# Patient Record
Sex: FEMALE | Race: ASIAN | NOT HISPANIC OR LATINO | Employment: UNEMPLOYED | URBAN - METROPOLITAN AREA
[De-identification: names, ages, dates, MRNs, and addresses within clinical notes are randomized per-mention and may not be internally consistent; named-entity substitution may affect disease eponyms.]

---

## 2017-07-06 ENCOUNTER — COMMUNICATION - HEALTHEAST (OUTPATIENT)
Dept: FAMILY MEDICINE | Facility: CLINIC | Age: 22
End: 2017-07-06

## 2017-07-06 DIAGNOSIS — Z11.1 SCREENING FOR TUBERCULOSIS: ICD-10-CM

## 2017-07-07 ENCOUNTER — AMBULATORY - HEALTHEAST (OUTPATIENT)
Dept: LAB | Facility: CLINIC | Age: 22
End: 2017-07-07

## 2017-07-07 DIAGNOSIS — Z11.1 SCREENING FOR TUBERCULOSIS: ICD-10-CM

## 2017-07-11 ENCOUNTER — COMMUNICATION - HEALTHEAST (OUTPATIENT)
Dept: FAMILY MEDICINE | Facility: CLINIC | Age: 22
End: 2017-07-11

## 2017-10-27 ENCOUNTER — COMMUNICATION - HEALTHEAST (OUTPATIENT)
Dept: FAMILY MEDICINE | Facility: CLINIC | Age: 22
End: 2017-10-27

## 2017-11-01 ENCOUNTER — OFFICE VISIT - HEALTHEAST (OUTPATIENT)
Dept: FAMILY MEDICINE | Facility: CLINIC | Age: 22
End: 2017-11-01

## 2017-11-01 DIAGNOSIS — Z00.00 ROUTINE GENERAL MEDICAL EXAMINATION AT A HEALTH CARE FACILITY: ICD-10-CM

## 2017-11-01 DIAGNOSIS — Z23 NEED FOR IMMUNIZATION AGAINST INFLUENZA: ICD-10-CM

## 2017-11-01 DIAGNOSIS — Z11.1 SCREENING-PULMONARY TB: ICD-10-CM

## 2017-11-01 ASSESSMENT — MIFFLIN-ST. JEOR: SCORE: 1116.03

## 2017-11-03 ENCOUNTER — COMMUNICATION - HEALTHEAST (OUTPATIENT)
Dept: FAMILY MEDICINE | Facility: CLINIC | Age: 22
End: 2017-11-03

## 2018-03-21 ENCOUNTER — COMMUNICATION - HEALTHEAST (OUTPATIENT)
Dept: FAMILY MEDICINE | Facility: CLINIC | Age: 23
End: 2018-03-21

## 2018-03-29 ENCOUNTER — OFFICE VISIT - HEALTHEAST (OUTPATIENT)
Dept: FAMILY MEDICINE | Facility: CLINIC | Age: 23
End: 2018-03-29

## 2018-03-29 DIAGNOSIS — Z00.00 ROUTINE GENERAL MEDICAL EXAMINATION AT A HEALTH CARE FACILITY: ICD-10-CM

## 2018-03-29 DIAGNOSIS — Z32.01 POSITIVE PREGNANCY TEST: ICD-10-CM

## 2018-03-29 LAB
CLUE CELLS: NORMAL
HCG UR QL: POSITIVE
TRICHOMONAS, WET PREP: NORMAL
YEAST, WET PREP: NORMAL

## 2018-03-29 ASSESSMENT — MIFFLIN-ST. JEOR: SCORE: 1114.33

## 2018-03-30 LAB
C TRACH DNA SPEC QL PROBE+SIG AMP: NEGATIVE
N GONORRHOEA DNA SPEC QL NAA+PROBE: NEGATIVE

## 2018-04-11 ENCOUNTER — COMMUNICATION - HEALTHEAST (OUTPATIENT)
Dept: FAMILY MEDICINE | Facility: CLINIC | Age: 23
End: 2018-04-11

## 2018-04-13 ENCOUNTER — PRENATAL OFFICE VISIT - HEALTHEAST (OUTPATIENT)
Dept: FAMILY MEDICINE | Facility: CLINIC | Age: 23
End: 2018-04-13

## 2018-04-13 DIAGNOSIS — Z32.01 POSITIVE PREGNANCY TEST: ICD-10-CM

## 2018-04-13 DIAGNOSIS — Z34.90 SUPERVISION OF NORMAL PREGNANCY: ICD-10-CM

## 2018-04-13 LAB
ALBUMIN UR-MCNC: NEGATIVE MG/DL
AMPHETAMINES UR QL SCN: NORMAL
BARBITURATES UR QL: NORMAL
BASOPHILS # BLD AUTO: 0 THOU/UL (ref 0–0.2)
BASOPHILS NFR BLD AUTO: 0 % (ref 0–2)
BENZODIAZ UR QL: NORMAL
CANNABINOIDS UR QL SCN: NORMAL
COCAINE UR QL: NORMAL
COLLECTION METHOD: NORMAL
CREAT UR-MCNC: 223.5 MG/DL
EOSINOPHIL # BLD AUTO: 0 THOU/UL (ref 0–0.4)
EOSINOPHIL NFR BLD AUTO: 0 % (ref 0–6)
ERYTHROCYTE [DISTWIDTH] IN BLOOD BY AUTOMATED COUNT: 12.7 % (ref 11–14.5)
GLUCOSE UR STRIP-MCNC: NEGATIVE MG/DL
HCT VFR BLD AUTO: 37.5 % (ref 35–47)
HGB BLD-MCNC: 12.2 G/DL (ref 12–16)
HIV 1+2 AB+HIV1 P24 AG SERPL QL IA: NEGATIVE
KETONES UR STRIP-MCNC: NEGATIVE MG/DL
LEAD BLD-MCNC: NORMAL UG/DL
LEAD RETEST: NO
LYMPHOCYTES # BLD AUTO: 1.2 THOU/UL (ref 0.8–4.4)
LYMPHOCYTES NFR BLD AUTO: 22 % (ref 20–40)
MCH RBC QN AUTO: 30 PG (ref 27–34)
MCHC RBC AUTO-ENTMCNC: 32.5 G/DL (ref 32–36)
MCV RBC AUTO: 92 FL (ref 80–100)
MONOCYTES # BLD AUTO: 0.3 THOU/UL (ref 0–0.9)
MONOCYTES NFR BLD AUTO: 6 % (ref 2–10)
NEUTROPHILS # BLD AUTO: 4 THOU/UL (ref 2–7.7)
NEUTROPHILS NFR BLD AUTO: 72 % (ref 50–70)
OPIATES UR QL SCN: NORMAL
OXYCODONE UR QL: NORMAL
PCP UR QL SCN: NORMAL
PLATELET # BLD AUTO: 252 THOU/UL (ref 140–440)
PMV BLD AUTO: 10.7 FL (ref 8.5–12.5)
RBC # BLD AUTO: 4.07 MILL/UL (ref 3.8–5.4)
WBC: 5.6 THOU/UL (ref 4–11)

## 2018-04-14 LAB
ANTIBODY SCREEN: NEGATIVE
GUARDIAN FIRST NAME: NORMAL
GUARDIAN LAST NAME: NORMAL
HBV SURFACE AG SERPL QL IA: NEGATIVE
HEALTH CARE PROVIDER CITY: NORMAL
HEALTH CARE PROVIDER NAME: NORMAL
HEALTH CARE PROVIDER PHONE: NORMAL
HEALTH CARE PROVIDER STATE: NORMAL
HEALTH CARE PROVIDER STREET ADDRESS: NORMAL
HEALTH CARE PROVIDER ZIP CODE: NORMAL
LEAD, B: <1 MCG/DL (ref 0–4.9)
PATIENT CITY: NORMAL
PATIENT COUNTY: NORMAL
PATIENT EMPLOYER: NORMAL
PATIENT ETHNICITY: NORMAL
PATIENT HOME PHONE: NORMAL
PATIENT OCCUPATION: NORMAL
PATIENT RACE: NORMAL
PATIENT STATE: NORMAL
PATIENT STREET ADDRESS: NORMAL
PATIENT ZIP CODE: NORMAL
SUBMITTING LABORATORY PHONE: NORMAL
T PALLIDUM AB SER QL: NEGATIVE
VENOUS/CAPILLARY: NORMAL

## 2018-04-15 ENCOUNTER — AMBULATORY - HEALTHEAST (OUTPATIENT)
Dept: FAMILY MEDICINE | Facility: CLINIC | Age: 23
End: 2018-04-15

## 2018-04-15 LAB — BACTERIA SPEC CULT: ABNORMAL

## 2018-04-16 LAB
ABO/RH(D): NORMAL
ABORH REPEAT: NORMAL
RUBV IGG SERPL QL IA: POSITIVE

## 2018-04-20 ENCOUNTER — HOSPITAL ENCOUNTER (OUTPATIENT)
Dept: ULTRASOUND IMAGING | Facility: HOSPITAL | Age: 23
Discharge: HOME | End: 2018-04-20
Attending: PHYSICIAN ASSISTANT

## 2018-04-20 DIAGNOSIS — Z34.90 SUPERVISION OF NORMAL PREGNANCY: ICD-10-CM

## 2018-04-20 DIAGNOSIS — Z32.01 POSITIVE PREGNANCY TEST: ICD-10-CM

## 2018-05-08 ENCOUNTER — COMMUNICATION - HEALTHEAST (OUTPATIENT)
Dept: FAMILY MEDICINE | Facility: CLINIC | Age: 23
End: 2018-05-08

## 2018-05-11 ENCOUNTER — PRENATAL OFFICE VISIT - HEALTHEAST (OUTPATIENT)
Dept: FAMILY MEDICINE | Facility: CLINIC | Age: 23
End: 2018-05-11

## 2018-05-11 DIAGNOSIS — Z34.90 SUPERVISION OF NORMAL PREGNANCY: ICD-10-CM

## 2018-05-11 LAB
ALBUMIN UR-MCNC: ABNORMAL MG/DL
GLUCOSE UR STRIP-MCNC: NEGATIVE MG/DL
KETONES UR STRIP-MCNC: ABNORMAL MG/DL

## 2018-05-11 ASSESSMENT — MIFFLIN-ST. JEOR: SCORE: 1127.94

## 2018-05-12 LAB — BACTERIA SPEC CULT: NO GROWTH

## 2018-06-08 ENCOUNTER — PRENATAL OFFICE VISIT - HEALTHEAST (OUTPATIENT)
Dept: FAMILY MEDICINE | Facility: CLINIC | Age: 23
End: 2018-06-08

## 2018-06-08 DIAGNOSIS — Z34.90 SUPERVISION OF NORMAL PREGNANCY: ICD-10-CM

## 2018-06-08 LAB
ALBUMIN UR-MCNC: ABNORMAL MG/DL
GLUCOSE UR STRIP-MCNC: NEGATIVE MG/DL
KETONES UR STRIP-MCNC: NEGATIVE MG/DL

## 2018-06-08 ASSESSMENT — MIFFLIN-ST. JEOR: SCORE: 1115.46

## 2018-07-06 ENCOUNTER — PRENATAL OFFICE VISIT - HEALTHEAST (OUTPATIENT)
Dept: FAMILY MEDICINE | Facility: CLINIC | Age: 23
End: 2018-07-06

## 2018-07-06 DIAGNOSIS — Z34.92 NORMAL PREGNANCY, SECOND TRIMESTER: ICD-10-CM

## 2018-07-06 LAB
ALBUMIN UR-MCNC: NEGATIVE MG/DL
GLUCOSE UR STRIP-MCNC: NEGATIVE MG/DL
KETONES UR STRIP-MCNC: ABNORMAL MG/DL

## 2018-07-13 ENCOUNTER — HOSPITAL ENCOUNTER (OUTPATIENT)
Dept: ULTRASOUND IMAGING | Facility: HOSPITAL | Age: 23
Discharge: HOME | End: 2018-07-13
Attending: FAMILY MEDICINE

## 2018-08-01 ENCOUNTER — PRENATAL OFFICE VISIT - HEALTHEAST (OUTPATIENT)
Dept: FAMILY MEDICINE | Facility: CLINIC | Age: 23
End: 2018-08-01

## 2018-08-01 DIAGNOSIS — Z34.92 ENCOUNTER FOR SUPERVISION OF NORMAL PREGNANCY IN SECOND TRIMESTER: ICD-10-CM

## 2018-08-01 LAB
ALBUMIN UR-MCNC: NEGATIVE MG/DL
GLUCOSE UR STRIP-MCNC: NEGATIVE MG/DL
KETONES UR STRIP-MCNC: NEGATIVE MG/DL

## 2018-08-20 ENCOUNTER — COMMUNICATION - HEALTHEAST (OUTPATIENT)
Dept: FAMILY MEDICINE | Facility: CLINIC | Age: 23
End: 2018-08-20

## 2018-08-20 DIAGNOSIS — Z11.1 SCREENING-PULMONARY TB: ICD-10-CM

## 2018-08-27 ENCOUNTER — AMBULATORY - HEALTHEAST (OUTPATIENT)
Dept: LAB | Facility: CLINIC | Age: 23
End: 2018-08-27

## 2018-08-27 DIAGNOSIS — Z11.1 SCREENING-PULMONARY TB: ICD-10-CM

## 2018-08-29 ENCOUNTER — PRENATAL OFFICE VISIT - HEALTHEAST (OUTPATIENT)
Dept: FAMILY MEDICINE | Facility: CLINIC | Age: 23
End: 2018-08-29

## 2018-08-29 DIAGNOSIS — Z32.01 POSITIVE PREGNANCY TEST: ICD-10-CM

## 2018-08-29 DIAGNOSIS — Z34.92 ENCOUNTER FOR SUPERVISION OF NORMAL PREGNANCY IN SECOND TRIMESTER: ICD-10-CM

## 2018-08-29 LAB
ALBUMIN UR-MCNC: NEGATIVE MG/DL
FASTING STATUS PATIENT QL REPORTED: NO
GLUCOSE 1H P 50 G GLC PO SERPL-MCNC: 102 MG/DL (ref 70–139)
GLUCOSE UR STRIP-MCNC: NEGATIVE MG/DL
HGB BLD-MCNC: 11.2 G/DL (ref 12–16)
KETONES UR STRIP-MCNC: NEGATIVE MG/DL

## 2018-08-30 ENCOUNTER — COMMUNICATION - HEALTHEAST (OUTPATIENT)
Dept: FAMILY MEDICINE | Facility: CLINIC | Age: 23
End: 2018-08-30

## 2018-08-30 LAB
BACTERIA SPEC CULT: NO GROWTH
T PALLIDUM AB SER QL: NEGATIVE

## 2018-09-07 ENCOUNTER — COMMUNICATION - HEALTHEAST (OUTPATIENT)
Dept: FAMILY MEDICINE | Facility: CLINIC | Age: 23
End: 2018-09-07

## 2018-09-10 ENCOUNTER — AMBULATORY - HEALTHEAST (OUTPATIENT)
Dept: LAB | Facility: CLINIC | Age: 23
End: 2018-09-10

## 2018-09-10 DIAGNOSIS — Z11.1 SCREENING-PULMONARY TB: ICD-10-CM

## 2018-09-12 ENCOUNTER — COMMUNICATION - HEALTHEAST (OUTPATIENT)
Dept: FAMILY MEDICINE | Facility: CLINIC | Age: 23
End: 2018-09-12

## 2018-09-13 LAB
GAMMA INTERFERON BACKGROUND BLD IA-ACNC: 0.24 IU/ML
M TB IFN-G BLD-IMP: NEGATIVE
MITOGEN IGNF BCKGRD COR BLD-ACNC: 0.05 IU/ML
MITOGEN IGNF BCKGRD COR BLD-ACNC: 0.06 IU/ML
QTF INTERPRETATION: NORMAL
QTF MITOGEN - NIL: 6.89 IU/ML

## 2018-09-21 ENCOUNTER — AMBULATORY - HEALTHEAST (OUTPATIENT)
Dept: NURSING | Facility: CLINIC | Age: 23
End: 2018-09-21

## 2018-09-21 DIAGNOSIS — Z23 NEED FOR IMMUNIZATION AGAINST INFLUENZA: ICD-10-CM

## 2018-09-28 ENCOUNTER — PRENATAL OFFICE VISIT - HEALTHEAST (OUTPATIENT)
Dept: FAMILY MEDICINE | Facility: CLINIC | Age: 23
End: 2018-09-28

## 2018-09-28 DIAGNOSIS — Z34.90 SUPERVISION OF NORMAL PREGNANCY: ICD-10-CM

## 2018-09-28 DIAGNOSIS — Z34.92 ENCOUNTER FOR SUPERVISION OF NORMAL PREGNANCY IN SECOND TRIMESTER: ICD-10-CM

## 2018-10-26 ENCOUNTER — PRENATAL OFFICE VISIT - HEALTHEAST (OUTPATIENT)
Dept: FAMILY MEDICINE | Facility: CLINIC | Age: 23
End: 2018-10-26

## 2018-10-26 DIAGNOSIS — Z34.93 THIRD TRIMESTER PREGNANCY: ICD-10-CM

## 2018-10-26 DIAGNOSIS — Z34.92 ENCOUNTER FOR SUPERVISION OF NORMAL PREGNANCY IN SECOND TRIMESTER: ICD-10-CM

## 2018-10-27 LAB
ALLERGIC TO PENICILLIN: NO
GP B STREP DNA SPEC QL NAA+PROBE: NEGATIVE

## 2018-11-02 ENCOUNTER — PRENATAL OFFICE VISIT - HEALTHEAST (OUTPATIENT)
Dept: FAMILY MEDICINE | Facility: CLINIC | Age: 23
End: 2018-11-02

## 2018-11-02 DIAGNOSIS — Z34.92 ENCOUNTER FOR SUPERVISION OF NORMAL PREGNANCY IN SECOND TRIMESTER: ICD-10-CM

## 2018-11-09 ENCOUNTER — PRENATAL OFFICE VISIT - HEALTHEAST (OUTPATIENT)
Dept: FAMILY MEDICINE | Facility: CLINIC | Age: 23
End: 2018-11-09

## 2018-11-09 DIAGNOSIS — Z34.93 THIRD TRIMESTER PREGNANCY: ICD-10-CM

## 2018-11-09 ASSESSMENT — MIFFLIN-ST. JEOR: SCORE: 1212.98

## 2018-11-12 ENCOUNTER — HOSPITAL ENCOUNTER (OUTPATIENT)
Dept: OBGYN | Facility: HOSPITAL | Age: 23
Discharge: HOME | End: 2018-11-12
Attending: FAMILY MEDICINE | Admitting: FAMILY MEDICINE

## 2018-11-12 ASSESSMENT — MIFFLIN-ST. JEOR: SCORE: 1210.72

## 2018-11-14 ENCOUNTER — HOME CARE/HOSPICE - HEALTHEAST (OUTPATIENT)
Dept: HOME HEALTH SERVICES | Facility: HOME HEALTH | Age: 23
End: 2018-11-14

## 2018-11-15 ENCOUNTER — HOME CARE/HOSPICE - HEALTHEAST (OUTPATIENT)
Dept: HOME HEALTH SERVICES | Facility: HOME HEALTH | Age: 23
End: 2018-11-15

## 2019-02-06 ENCOUNTER — RECORDS - HEALTHEAST (OUTPATIENT)
Dept: LAB | Facility: HOSPITAL | Age: 24
End: 2019-02-06

## 2019-02-06 LAB — HBV SURFACE AB SERPL IA-ACNC: POSITIVE M[IU]/ML

## 2019-02-08 LAB
GAMMA INTERFERON BACKGROUND BLD IA-ACNC: 0.09 IU/ML
M TB IFN-G BLD-IMP: NEGATIVE
MITOGEN IGNF BCKGRD COR BLD-ACNC: 0.03 IU/ML
MITOGEN IGNF BCKGRD COR BLD-ACNC: 0.05 IU/ML
QTF INTERPRETATION: NORMAL
QTF MITOGEN - NIL: >10 IU/ML

## 2019-06-25 ENCOUNTER — COMMUNICATION - HEALTHEAST (OUTPATIENT)
Dept: FAMILY MEDICINE | Facility: CLINIC | Age: 24
End: 2019-06-25

## 2019-07-02 ENCOUNTER — PRENATAL OFFICE VISIT - HEALTHEAST (OUTPATIENT)
Dept: FAMILY MEDICINE | Facility: CLINIC | Age: 24
End: 2019-07-02

## 2019-07-02 ENCOUNTER — HOSPITAL ENCOUNTER (OUTPATIENT)
Dept: ULTRASOUND IMAGING | Facility: HOSPITAL | Age: 24
Discharge: HOME | End: 2019-07-02
Attending: PHYSICIAN ASSISTANT

## 2019-07-02 DIAGNOSIS — Z32.01 PREGNANCY TEST POSITIVE: ICD-10-CM

## 2019-07-02 DIAGNOSIS — Z34.90 PREGNANCY, UNSPECIFIED GESTATIONAL AGE: ICD-10-CM

## 2019-07-02 DIAGNOSIS — N92.6 ABNORMAL MENSES: ICD-10-CM

## 2019-07-02 LAB
ALBUMIN UR-MCNC: ABNORMAL MG/DL
AMPHETAMINES UR QL SCN: NORMAL
BARBITURATES UR QL: NORMAL
BASOPHILS # BLD AUTO: 0 THOU/UL (ref 0–0.2)
BASOPHILS NFR BLD AUTO: 0 % (ref 0–2)
BENZODIAZ UR QL: NORMAL
CANNABINOIDS UR QL SCN: NORMAL
COCAINE UR QL: NORMAL
CREAT UR-MCNC: 264.9 MG/DL
EOSINOPHIL # BLD AUTO: 0 THOU/UL (ref 0–0.4)
EOSINOPHIL NFR BLD AUTO: 0 % (ref 0–6)
ERYTHROCYTE [DISTWIDTH] IN BLOOD BY AUTOMATED COUNT: 13.7 % (ref 11–14.5)
GLUCOSE UR STRIP-MCNC: NEGATIVE MG/DL
HCG UR QL: POSITIVE
HCT VFR BLD AUTO: 33.3 % (ref 35–47)
HGB BLD-MCNC: 10.8 G/DL (ref 12–16)
HIV 1+2 AB+HIV1 P24 AG SERPL QL IA: NEGATIVE
KETONES UR STRIP-MCNC: NEGATIVE MG/DL
LYMPHOCYTES # BLD AUTO: 1.3 THOU/UL (ref 0.8–4.4)
LYMPHOCYTES NFR BLD AUTO: 24 % (ref 20–40)
MCH RBC QN AUTO: 29.7 PG (ref 27–34)
MCHC RBC AUTO-ENTMCNC: 32.4 G/DL (ref 32–36)
MCV RBC AUTO: 92 FL (ref 80–100)
MONOCYTES # BLD AUTO: 0.3 THOU/UL (ref 0–0.9)
MONOCYTES NFR BLD AUTO: 5 % (ref 2–10)
NEUTROPHILS # BLD AUTO: 4 THOU/UL (ref 2–7.7)
NEUTROPHILS NFR BLD AUTO: 71 % (ref 50–70)
OPIATES UR QL SCN: NORMAL
OXYCODONE UR QL: NORMAL
PCP UR QL SCN: NORMAL
PLATELET # BLD AUTO: 261 THOU/UL (ref 140–440)
PMV BLD AUTO: 10.5 FL (ref 8.5–12.5)
RBC # BLD AUTO: 3.64 MILL/UL (ref 3.8–5.4)
WBC: 5.6 THOU/UL (ref 4–11)

## 2019-07-03 ENCOUNTER — COMMUNICATION - HEALTHEAST (OUTPATIENT)
Dept: FAMILY MEDICINE | Facility: CLINIC | Age: 24
End: 2019-07-03

## 2019-07-03 LAB
ABO/RH(D): NORMAL
ABORH REPEAT: NORMAL
ANTIBODY SCREEN: NEGATIVE
BACTERIA SPEC CULT: NO GROWTH
COLLECTION METHOD: NORMAL
HBV SURFACE AG SERPL QL IA: NEGATIVE
LEAD BLD-MCNC: NORMAL UG/DL
LEAD RETEST: NO
RUBV IGG SERPL QL IA: POSITIVE
T PALLIDUM AB SER QL: NEGATIVE

## 2019-07-04 LAB — LEAD BLDV-MCNC: <2 UG/DL (ref 0–4.9)

## 2019-07-05 ENCOUNTER — AMBULATORY - HEALTHEAST (OUTPATIENT)
Dept: NURSING | Facility: CLINIC | Age: 24
End: 2019-07-05

## 2019-07-05 DIAGNOSIS — Z11.1 SCREENING EXAMINATION FOR PULMONARY TUBERCULOSIS: ICD-10-CM

## 2019-07-08 ENCOUNTER — COMMUNICATION - HEALTHEAST (OUTPATIENT)
Dept: FAMILY MEDICINE | Facility: CLINIC | Age: 24
End: 2019-07-08

## 2019-07-08 LAB
GAMMA INTERFERON BACKGROUND BLD IA-ACNC: 0.05 IU/ML
M TB IFN-G BLD-IMP: NEGATIVE
MITOGEN IGNF BCKGRD COR BLD-ACNC: -0.01 IU/ML
MITOGEN IGNF BCKGRD COR BLD-ACNC: 0 IU/ML
QTF INTERPRETATION: NORMAL
QTF MITOGEN - NIL: 5.61 IU/ML

## 2019-07-15 ENCOUNTER — COMMUNICATION - HEALTHEAST (OUTPATIENT)
Dept: FAMILY MEDICINE | Facility: CLINIC | Age: 24
End: 2019-07-15

## 2019-07-18 ENCOUNTER — COMMUNICATION - HEALTHEAST (OUTPATIENT)
Dept: FAMILY MEDICINE | Facility: CLINIC | Age: 24
End: 2019-07-18

## 2019-07-22 ENCOUNTER — COMMUNICATION - HEALTHEAST (OUTPATIENT)
Dept: FAMILY MEDICINE | Facility: CLINIC | Age: 24
End: 2019-07-22

## 2019-07-23 ENCOUNTER — AMBULATORY - HEALTHEAST (OUTPATIENT)
Dept: FAMILY MEDICINE | Facility: CLINIC | Age: 24
End: 2019-07-23

## 2019-07-23 ENCOUNTER — AMBULATORY - HEALTHEAST (OUTPATIENT)
Dept: LAB | Facility: CLINIC | Age: 24
End: 2019-07-23

## 2019-07-23 DIAGNOSIS — Z11.1 SCREENING-PULMONARY TB: ICD-10-CM

## 2019-07-26 LAB
GAMMA INTERFERON BACKGROUND BLD IA-ACNC: 0.15 IU/ML
M TB IFN-G BLD-IMP: NEGATIVE
MITOGEN IGNF BCKGRD COR BLD-ACNC: -0.02 IU/ML
MITOGEN IGNF BCKGRD COR BLD-ACNC: -0.04 IU/ML
QTF INTERPRETATION: NORMAL
QTF MITOGEN - NIL: >10 IU/ML

## 2019-07-29 ENCOUNTER — OFFICE VISIT - HEALTHEAST (OUTPATIENT)
Dept: FAMILY MEDICINE | Facility: CLINIC | Age: 24
End: 2019-07-29

## 2019-07-29 DIAGNOSIS — Z00.00 ROUTINE GENERAL MEDICAL EXAMINATION AT A HEALTH CARE FACILITY: ICD-10-CM

## 2019-07-29 DIAGNOSIS — Z23 NEED FOR TETANUS BOOSTER: ICD-10-CM

## 2019-07-29 DIAGNOSIS — L70.8 OTHER ACNE: ICD-10-CM

## 2019-07-29 DIAGNOSIS — Q67.5 CONGENITAL MUSCULOSKELETAL DEFORMITY OF SPINE: ICD-10-CM

## 2019-07-29 DIAGNOSIS — R10.13 ABDOMINAL PAIN, EPIGASTRIC: ICD-10-CM

## 2019-07-29 LAB
ALBUMIN SERPL-MCNC: 4.2 G/DL (ref 3.5–5)
ALBUMIN UR-MCNC: NEGATIVE MG/DL
ALP SERPL-CCNC: 68 U/L (ref 45–120)
ALT SERPL W P-5'-P-CCNC: <9 U/L (ref 0–45)
AMYLASE SERPL-CCNC: 80 U/L (ref 5–120)
ANION GAP SERPL CALCULATED.3IONS-SCNC: 11 MMOL/L (ref 5–18)
APPEARANCE UR: CLEAR
AST SERPL W P-5'-P-CCNC: 15 U/L (ref 0–40)
BACTERIA #/AREA URNS HPF: ABNORMAL HPF
BASOPHILS # BLD AUTO: 0 THOU/UL (ref 0–0.2)
BASOPHILS NFR BLD AUTO: 0 % (ref 0–2)
BILIRUB SERPL-MCNC: 0.5 MG/DL (ref 0–1)
BILIRUB UR QL STRIP: NEGATIVE
BUN SERPL-MCNC: 12 MG/DL (ref 8–22)
CALCIUM SERPL-MCNC: 9.8 MG/DL (ref 8.5–10.5)
CHLORIDE BLD-SCNC: 107 MMOL/L (ref 98–107)
CO2 SERPL-SCNC: 20 MMOL/L (ref 22–31)
COLOR UR AUTO: YELLOW
CREAT SERPL-MCNC: 0.66 MG/DL (ref 0.6–1.1)
EOSINOPHIL # BLD AUTO: 0 THOU/UL (ref 0–0.4)
EOSINOPHIL NFR BLD AUTO: 0 % (ref 0–6)
ERYTHROCYTE [DISTWIDTH] IN BLOOD BY AUTOMATED COUNT: 11.3 % (ref 11–14.5)
GFR SERPL CREATININE-BSD FRML MDRD: >60 ML/MIN/1.73M2
GLUCOSE BLD-MCNC: 80 MG/DL (ref 70–125)
GLUCOSE UR STRIP-MCNC: NEGATIVE MG/DL
HCT VFR BLD AUTO: 41.5 % (ref 35–47)
HGB BLD-MCNC: 13.6 G/DL (ref 12–16)
HGB UR QL STRIP: ABNORMAL
HIV 1+2 AB+HIV1 P24 AG SERPL QL IA: NEGATIVE
KETONES UR STRIP-MCNC: NEGATIVE MG/DL
LEUKOCYTE ESTERASE UR QL STRIP: NEGATIVE
LIPASE SERPL-CCNC: 20 U/L (ref 0–52)
LYMPHOCYTES # BLD AUTO: 1.7 THOU/UL (ref 0.8–4.4)
LYMPHOCYTES NFR BLD AUTO: 27 % (ref 20–40)
MCH RBC QN AUTO: 31.3 PG (ref 27–34)
MCHC RBC AUTO-ENTMCNC: 32.8 G/DL (ref 32–36)
MCV RBC AUTO: 95 FL (ref 80–100)
MONOCYTES # BLD AUTO: 0.3 THOU/UL (ref 0–0.9)
MONOCYTES NFR BLD AUTO: 4 % (ref 2–10)
NEUTROPHILS # BLD AUTO: 4.3 THOU/UL (ref 2–7.7)
NEUTROPHILS NFR BLD AUTO: 69 % (ref 50–70)
NITRATE UR QL: NEGATIVE
PH UR STRIP: 6 [PH] (ref 5–8)
PLATELET # BLD AUTO: 236 THOU/UL (ref 140–440)
PMV BLD AUTO: 10.5 FL (ref 8.5–12.5)
POTASSIUM BLD-SCNC: 4.2 MMOL/L (ref 3.5–5)
PROT SERPL-MCNC: 7.4 G/DL (ref 6–8)
RBC # BLD AUTO: 4.35 MILL/UL (ref 3.8–5.4)
RBC #/AREA URNS AUTO: ABNORMAL HPF
SODIUM SERPL-SCNC: 138 MMOL/L (ref 136–145)
SP GR UR STRIP: 1.01 (ref 1–1.03)
SQUAMOUS #/AREA URNS AUTO: ABNORMAL LPF
UROBILINOGEN UR STRIP-ACNC: ABNORMAL
WBC #/AREA URNS AUTO: ABNORMAL HPF
WBC: 6.3 THOU/UL (ref 4–11)

## 2019-07-29 ASSESSMENT — MIFFLIN-ST. JEOR: SCORE: 1115.12

## 2019-07-30 ENCOUNTER — HOSPITAL ENCOUNTER (OUTPATIENT)
Dept: ULTRASOUND IMAGING | Facility: HOSPITAL | Age: 24
Discharge: HOME | End: 2019-07-30
Attending: FAMILY MEDICINE

## 2019-07-30 DIAGNOSIS — R10.13 ABDOMINAL PAIN, EPIGASTRIC: ICD-10-CM

## 2019-07-30 LAB
BACTERIA SPEC CULT: NO GROWTH
C TRACH DNA SPEC QL PROBE+SIG AMP: NEGATIVE
N GONORRHOEA DNA SPEC QL NAA+PROBE: NEGATIVE

## 2019-07-31 ENCOUNTER — COMMUNICATION - HEALTHEAST (OUTPATIENT)
Dept: FAMILY MEDICINE | Facility: CLINIC | Age: 24
End: 2019-07-31

## 2019-08-01 ENCOUNTER — COMMUNICATION - HEALTHEAST (OUTPATIENT)
Dept: FAMILY MEDICINE | Facility: CLINIC | Age: 24
End: 2019-08-01

## 2019-08-05 ENCOUNTER — AMBULATORY - HEALTHEAST (OUTPATIENT)
Dept: FAMILY MEDICINE | Facility: CLINIC | Age: 24
End: 2019-08-05

## 2019-08-05 DIAGNOSIS — N20.0 NEPHROLITHIASIS: ICD-10-CM

## 2019-08-09 ENCOUNTER — OFFICE VISIT - HEALTHEAST (OUTPATIENT)
Dept: UROLOGY | Facility: CLINIC | Age: 24
End: 2019-08-09

## 2019-08-09 ENCOUNTER — HOSPITAL ENCOUNTER (OUTPATIENT)
Dept: CT IMAGING | Facility: CLINIC | Age: 24
Discharge: HOME | End: 2019-08-09
Attending: PHYSICIAN ASSISTANT

## 2019-08-09 DIAGNOSIS — R31.21 ASYMPTOMATIC MICROSCOPIC HEMATURIA: ICD-10-CM

## 2019-08-09 DIAGNOSIS — R10.9 ABDOMINAL PAIN: ICD-10-CM

## 2019-08-09 LAB
ALBUMIN UR-MCNC: NEGATIVE MG/DL
APPEARANCE UR: CLEAR
BILIRUB UR QL STRIP: NEGATIVE
COLOR UR AUTO: YELLOW
GLUCOSE UR STRIP-MCNC: NEGATIVE MG/DL
HGB UR QL STRIP: ABNORMAL
KETONES UR STRIP-MCNC: NEGATIVE MG/DL
LEUKOCYTE ESTERASE UR QL STRIP: NEGATIVE
NITRATE UR QL: NEGATIVE
PH UR STRIP: 6 [PH] (ref 5–8)
SP GR UR STRIP: 1.02 (ref 1–1.03)
UROBILINOGEN UR STRIP-ACNC: ABNORMAL

## 2019-09-27 ENCOUNTER — AMBULATORY - HEALTHEAST (OUTPATIENT)
Dept: NURSING | Facility: CLINIC | Age: 24
End: 2019-09-27

## 2019-09-27 ENCOUNTER — COMMUNICATION - HEALTHEAST (OUTPATIENT)
Dept: FAMILY MEDICINE | Facility: CLINIC | Age: 24
End: 2019-09-27

## 2019-10-11 ENCOUNTER — PRENATAL OFFICE VISIT - HEALTHEAST (OUTPATIENT)
Dept: FAMILY MEDICINE | Facility: CLINIC | Age: 24
End: 2019-10-11

## 2019-10-11 DIAGNOSIS — O09.899 SHORT INTERVAL BETWEEN PREGNANCIES AFFECTING PREGNANCY, ANTEPARTUM: ICD-10-CM

## 2019-10-11 DIAGNOSIS — O09.30 LATE PRENATAL CARE: ICD-10-CM

## 2019-10-11 DIAGNOSIS — Z3A.32 32 WEEKS GESTATION OF PREGNANCY: ICD-10-CM

## 2019-10-11 LAB — GLUCOSE 1H P 100 G GLC PO SERPL-MCNC: 112 MG/DL

## 2019-10-11 ASSESSMENT — MIFFLIN-ST. JEOR: SCORE: 1180.38

## 2019-10-14 LAB
C TRACH DNA SPEC QL PROBE+SIG AMP: NEGATIVE
N GONORRHOEA DNA SPEC QL NAA+PROBE: NEGATIVE

## 2019-10-28 ENCOUNTER — PRENATAL OFFICE VISIT - HEALTHEAST (OUTPATIENT)
Dept: FAMILY MEDICINE | Facility: CLINIC | Age: 24
End: 2019-10-28

## 2019-10-28 DIAGNOSIS — Z34.93 THIRD TRIMESTER PREGNANCY: ICD-10-CM

## 2019-10-28 DIAGNOSIS — Z34.90 PREGNANCY, UNSPECIFIED GESTATIONAL AGE: ICD-10-CM

## 2019-10-28 ASSESSMENT — MIFFLIN-ST. JEOR: SCORE: 1188.04

## 2019-11-12 ENCOUNTER — PRENATAL OFFICE VISIT - HEALTHEAST (OUTPATIENT)
Dept: FAMILY MEDICINE | Facility: CLINIC | Age: 24
End: 2019-11-12

## 2019-11-12 ENCOUNTER — AMBULATORY - HEALTHEAST (OUTPATIENT)
Dept: FAMILY MEDICINE | Facility: CLINIC | Age: 24
End: 2019-11-12

## 2019-11-12 DIAGNOSIS — O99.013 ANEMIA DURING PREGNANCY IN THIRD TRIMESTER: ICD-10-CM

## 2019-11-12 DIAGNOSIS — Z34.93 THIRD TRIMESTER PREGNANCY: ICD-10-CM

## 2019-11-12 LAB — HGB BLD-MCNC: 8.5 G/DL (ref 12–16)

## 2019-11-12 ASSESSMENT — MIFFLIN-ST. JEOR: SCORE: 1215.25

## 2019-11-13 ENCOUNTER — COMMUNICATION - HEALTHEAST (OUTPATIENT)
Dept: FAMILY MEDICINE | Facility: CLINIC | Age: 24
End: 2019-11-13

## 2019-11-14 LAB
ALLERGIC TO PENICILLIN: NO
GP B STREP DNA SPEC QL NAA+PROBE: NEGATIVE

## 2019-11-19 ENCOUNTER — PRENATAL OFFICE VISIT - HEALTHEAST (OUTPATIENT)
Dept: FAMILY MEDICINE | Facility: CLINIC | Age: 24
End: 2019-11-19

## 2019-11-19 DIAGNOSIS — Z34.93 THIRD TRIMESTER PREGNANCY: ICD-10-CM

## 2019-11-19 DIAGNOSIS — O99.013 ANEMIA DURING PREGNANCY IN THIRD TRIMESTER: ICD-10-CM

## 2019-11-19 ASSESSMENT — MIFFLIN-ST. JEOR: SCORE: 1201.64

## 2019-11-29 ENCOUNTER — PRENATAL OFFICE VISIT - HEALTHEAST (OUTPATIENT)
Dept: FAMILY MEDICINE | Facility: CLINIC | Age: 24
End: 2019-11-29

## 2019-11-29 DIAGNOSIS — Z34.93 THIRD TRIMESTER PREGNANCY: ICD-10-CM

## 2019-11-29 ASSESSMENT — MIFFLIN-ST. JEOR: SCORE: 1210.72

## 2019-12-01 ENCOUNTER — HOME CARE/HOSPICE - HEALTHEAST (OUTPATIENT)
Dept: HOME HEALTH SERVICES | Facility: HOME HEALTH | Age: 24
End: 2019-12-01

## 2019-12-02 ENCOUNTER — HOME CARE/HOSPICE - HEALTHEAST (OUTPATIENT)
Dept: HOME HEALTH SERVICES | Facility: HOME HEALTH | Age: 24
End: 2019-12-02

## 2019-12-08 ENCOUNTER — OFFICE VISIT - HEALTHEAST (OUTPATIENT)
Dept: FAMILY MEDICINE | Facility: CLINIC | Age: 24
End: 2019-12-08

## 2019-12-08 DIAGNOSIS — R30.0 DYSURIA: ICD-10-CM

## 2019-12-08 LAB
ALBUMIN UR-MCNC: NEGATIVE MG/DL
APPEARANCE UR: CLEAR
BACTERIA #/AREA URNS HPF: ABNORMAL HPF
BILIRUB UR QL STRIP: NEGATIVE
COLOR UR AUTO: YELLOW
GLUCOSE UR STRIP-MCNC: NEGATIVE MG/DL
HGB UR QL STRIP: ABNORMAL
KETONES UR STRIP-MCNC: NEGATIVE MG/DL
LEUKOCYTE ESTERASE UR QL STRIP: ABNORMAL
MUCOUS THREADS #/AREA URNS LPF: ABNORMAL LPF
NITRATE UR QL: NEGATIVE
PH UR STRIP: 6 [PH] (ref 5–8)
RBC #/AREA URNS AUTO: ABNORMAL HPF
SP GR UR STRIP: >=1.03 (ref 1–1.03)
SQUAMOUS #/AREA URNS AUTO: ABNORMAL LPF
UROBILINOGEN UR STRIP-ACNC: ABNORMAL
WBC #/AREA URNS AUTO: ABNORMAL HPF

## 2019-12-09 LAB — BACTERIA SPEC CULT: NORMAL

## 2019-12-13 ENCOUNTER — COMMUNICATION - HEALTHEAST (OUTPATIENT)
Dept: FAMILY MEDICINE | Facility: CLINIC | Age: 24
End: 2019-12-13

## 2020-07-15 ENCOUNTER — AMBULATORY - HEALTHEAST (OUTPATIENT)
Dept: LAB | Facility: CLINIC | Age: 25
End: 2020-07-15

## 2020-07-15 DIAGNOSIS — Z11.1 SCREENING EXAMINATION FOR PULMONARY TUBERCULOSIS: ICD-10-CM

## 2020-07-17 LAB
GAMMA INTERFERON BACKGROUND BLD IA-ACNC: 0.21 IU/ML
M TB IFN-G BLD-IMP: NEGATIVE
MITOGEN IGNF BCKGRD COR BLD-ACNC: -0.02 IU/ML
MITOGEN IGNF BCKGRD COR BLD-ACNC: -0.04 IU/ML
QTF INTERPRETATION: NORMAL
QTF MITOGEN - NIL: 6.78 IU/ML

## 2020-07-21 ENCOUNTER — COMMUNICATION - HEALTHEAST (OUTPATIENT)
Dept: FAMILY MEDICINE | Facility: CLINIC | Age: 25
End: 2020-07-21

## 2020-08-11 ENCOUNTER — AMBULATORY - HEALTHEAST (OUTPATIENT)
Dept: LAB | Facility: CLINIC | Age: 25
End: 2020-08-11

## 2020-08-11 DIAGNOSIS — Z11.1 TUBERCULOSIS SCREENING: ICD-10-CM

## 2020-08-12 ENCOUNTER — COMMUNICATION - HEALTHEAST (OUTPATIENT)
Dept: FAMILY MEDICINE | Facility: CLINIC | Age: 25
End: 2020-08-12

## 2020-08-14 LAB
GAMMA INTERFERON BACKGROUND BLD IA-ACNC: 0.1 IU/ML
M TB IFN-G BLD-IMP: NEGATIVE
MITOGEN IGNF BCKGRD COR BLD-ACNC: -0.01 IU/ML
MITOGEN IGNF BCKGRD COR BLD-ACNC: -0.01 IU/ML
QTF INTERPRETATION: NORMAL
QTF MITOGEN - NIL: 4.83 IU/ML

## 2020-08-25 ENCOUNTER — PRENATAL OFFICE VISIT - HEALTHEAST (OUTPATIENT)
Dept: FAMILY MEDICINE | Facility: CLINIC | Age: 25
End: 2020-08-25

## 2020-08-25 DIAGNOSIS — N92.6 ABNORMAL MENSES: ICD-10-CM

## 2020-08-25 DIAGNOSIS — Z32.01 PREGNANCY TEST POSITIVE: ICD-10-CM

## 2020-08-25 DIAGNOSIS — Z3A.08 8 WEEKS GESTATION OF PREGNANCY: ICD-10-CM

## 2020-08-25 LAB
ALBUMIN UR-MCNC: NEGATIVE MG/DL
AMPHETAMINES UR QL SCN: NORMAL
BARBITURATES UR QL: NORMAL
BASOPHILS # BLD AUTO: 0 THOU/UL (ref 0–0.2)
BASOPHILS NFR BLD AUTO: 0 % (ref 0–2)
BENZODIAZ UR QL: NORMAL
CANNABINOIDS UR QL SCN: NORMAL
COCAINE UR QL: NORMAL
CREAT UR-MCNC: 183.6 MG/DL
EOSINOPHIL # BLD AUTO: 0 THOU/UL (ref 0–0.4)
EOSINOPHIL NFR BLD AUTO: 0 % (ref 0–6)
ERYTHROCYTE [DISTWIDTH] IN BLOOD BY AUTOMATED COUNT: 16.1 % (ref 11–14.5)
GLUCOSE UR STRIP-MCNC: NEGATIVE MG/DL
HCG UR QL: POSITIVE
HCT VFR BLD AUTO: 34.2 % (ref 35–47)
HGB BLD-MCNC: 10.3 G/DL (ref 12–16)
HIV 1+2 AB+HIV1 P24 AG SERPL QL IA: NEGATIVE
IMM GRANULOCYTES # BLD: 0 THOU/UL
IMM GRANULOCYTES NFR BLD: 0 %
KETONES UR STRIP-MCNC: NEGATIVE MG/DL
LYMPHOCYTES # BLD AUTO: 1.4 THOU/UL (ref 0.8–4.4)
LYMPHOCYTES NFR BLD AUTO: 21 % (ref 20–40)
MCH RBC QN AUTO: 24.7 PG (ref 27–34)
MCHC RBC AUTO-ENTMCNC: 30.1 G/DL (ref 32–36)
MCV RBC AUTO: 82 FL (ref 80–100)
MONOCYTES # BLD AUTO: 0.3 THOU/UL (ref 0–0.9)
MONOCYTES NFR BLD AUTO: 4 % (ref 2–10)
NEUTROPHILS # BLD AUTO: 5 THOU/UL (ref 2–7.7)
NEUTROPHILS NFR BLD AUTO: 74 % (ref 50–70)
OPIATES UR QL SCN: NORMAL
OXYCODONE UR QL: NORMAL
PCP UR QL SCN: NORMAL
PLATELET # BLD AUTO: 342 THOU/UL (ref 140–440)
PMV BLD AUTO: 10.9 FL (ref 8.5–12.5)
RBC # BLD AUTO: 4.17 MILL/UL (ref 3.8–5.4)
WBC: 6.7 THOU/UL (ref 4–11)

## 2020-08-25 ASSESSMENT — MIFFLIN-ST. JEOR: SCORE: 1147.78

## 2020-08-26 ENCOUNTER — HOSPITAL ENCOUNTER (OUTPATIENT)
Dept: ULTRASOUND IMAGING | Facility: HOSPITAL | Age: 25
Discharge: HOME | End: 2020-08-26
Attending: PHYSICIAN ASSISTANT

## 2020-08-26 DIAGNOSIS — Z32.01 PREGNANCY TEST POSITIVE: ICD-10-CM

## 2020-08-26 DIAGNOSIS — Z3A.08 8 WEEKS GESTATION OF PREGNANCY: ICD-10-CM

## 2020-08-26 LAB
ABO/RH(D): NORMAL
ABORH REPEAT: NORMAL
ANTIBODY SCREEN: NEGATIVE
BACTERIA SPEC CULT: NO GROWTH
HBV SURFACE AG SERPL QL IA: NEGATIVE
RUBV IGG SERPL QL IA: POSITIVE
T PALLIDUM AB SER QL: NEGATIVE

## 2020-08-27 ENCOUNTER — COMMUNICATION - HEALTHEAST (OUTPATIENT)
Dept: FAMILY MEDICINE | Facility: CLINIC | Age: 25
End: 2020-08-27

## 2020-08-27 DIAGNOSIS — O20.0 THREATENED MISCARRIAGE: ICD-10-CM

## 2020-08-27 LAB
C TRACH DNA SPEC QL PROBE+SIG AMP: NEGATIVE
N GONORRHOEA DNA SPEC QL NAA+PROBE: NEGATIVE

## 2020-08-28 ENCOUNTER — AMBULATORY - HEALTHEAST (OUTPATIENT)
Dept: LAB | Facility: CLINIC | Age: 25
End: 2020-08-28

## 2020-08-28 DIAGNOSIS — O20.0 THREATENED MISCARRIAGE: ICD-10-CM

## 2020-08-28 LAB — HCG SERPL-ACNC: ABNORMAL MLU/ML (ref 0–4)

## 2020-08-29 LAB
COLLECTION METHOD: NORMAL
LEAD BLD-MCNC: NORMAL UG/DL
LEAD BLDV-MCNC: <2 UG/DL

## 2020-08-31 ENCOUNTER — COMMUNICATION - HEALTHEAST (OUTPATIENT)
Dept: FAMILY MEDICINE | Facility: CLINIC | Age: 25
End: 2020-08-31

## 2020-09-01 ENCOUNTER — AMBULATORY - HEALTHEAST (OUTPATIENT)
Dept: LAB | Facility: CLINIC | Age: 25
End: 2020-09-01

## 2020-09-01 DIAGNOSIS — O20.0 THREATENED MISCARRIAGE: ICD-10-CM

## 2020-09-01 LAB — HCG SERPL-ACNC: ABNORMAL MLU/ML (ref 0–4)

## 2020-09-03 ENCOUNTER — HOSPITAL ENCOUNTER (OUTPATIENT)
Dept: ULTRASOUND IMAGING | Facility: HOSPITAL | Age: 25
Discharge: HOME | End: 2020-09-03
Attending: PHYSICIAN ASSISTANT

## 2020-09-03 DIAGNOSIS — O20.0 THREATENED MISCARRIAGE: ICD-10-CM

## 2020-09-04 ENCOUNTER — COMMUNICATION - HEALTHEAST (OUTPATIENT)
Dept: FAMILY MEDICINE | Facility: CLINIC | Age: 25
End: 2020-09-04

## 2020-09-09 ENCOUNTER — COMMUNICATION - HEALTHEAST (OUTPATIENT)
Dept: FAMILY MEDICINE | Facility: CLINIC | Age: 25
End: 2020-09-09

## 2020-09-09 DIAGNOSIS — O20.0 THREATENED MISCARRIAGE: ICD-10-CM

## 2020-09-18 ENCOUNTER — RECORDS - HEALTHEAST (OUTPATIENT)
Dept: ADMINISTRATIVE | Facility: OTHER | Age: 25
End: 2020-09-18

## 2020-09-18 ENCOUNTER — AMBULATORY - HEALTHEAST (OUTPATIENT)
Dept: OBGYN | Facility: CLINIC | Age: 25
End: 2020-09-18

## 2020-09-18 DIAGNOSIS — O02.1 MISSED ABORTION WITH FETAL DEMISE BEFORE 20 COMPLETED WEEKS OF GESTATION: ICD-10-CM

## 2020-09-19 ENCOUNTER — HOSPITAL ENCOUNTER (OUTPATIENT)
Dept: OBGYN | Facility: HOSPITAL | Age: 25
Discharge: HOME | End: 2020-09-19
Attending: OBSTETRICS & GYNECOLOGY | Admitting: OBSTETRICS & GYNECOLOGY

## 2020-09-19 LAB
SARS-COV-2 PCR COMMENT: NORMAL
SARS-COV-2 RNA SPEC QL NAA+PROBE: NEGATIVE
SARS-COV-2 VIRUS SPECIMEN SOURCE: NORMAL

## 2020-09-20 ENCOUNTER — COMMUNICATION - HEALTHEAST (OUTPATIENT)
Dept: SCHEDULING | Facility: CLINIC | Age: 25
End: 2020-09-20

## 2020-09-21 ENCOUNTER — ANESTHESIA - HEALTHEAST (OUTPATIENT)
Dept: SURGERY | Facility: AMBULATORY SURGERY CENTER | Age: 25
End: 2020-09-21

## 2020-09-21 ASSESSMENT — MIFFLIN-ST. JEOR: SCORE: 1122.84

## 2020-09-22 ENCOUNTER — SURGERY - HEALTHEAST (OUTPATIENT)
Dept: SURGERY | Facility: AMBULATORY SURGERY CENTER | Age: 25
End: 2020-09-22

## 2020-09-22 ASSESSMENT — MIFFLIN-ST. JEOR: SCORE: 1122.84

## 2020-12-07 ENCOUNTER — COMMUNICATION - HEALTHEAST (OUTPATIENT)
Dept: FAMILY MEDICINE | Facility: CLINIC | Age: 25
End: 2020-12-07

## 2020-12-14 ENCOUNTER — PRENATAL OFFICE VISIT - HEALTHEAST (OUTPATIENT)
Dept: FAMILY MEDICINE | Facility: CLINIC | Age: 25
End: 2020-12-14

## 2020-12-14 DIAGNOSIS — Z3A.01 LESS THAN 8 WEEKS GESTATION OF PREGNANCY: ICD-10-CM

## 2020-12-14 DIAGNOSIS — N92.6 ABNORMAL MENSES: ICD-10-CM

## 2020-12-14 DIAGNOSIS — Z32.01 PREGNANCY TEST POSITIVE: ICD-10-CM

## 2020-12-14 LAB
ALBUMIN UR-MCNC: ABNORMAL MG/DL
AMPHETAMINES UR QL SCN: NORMAL
BARBITURATES UR QL: NORMAL
BASOPHILS # BLD AUTO: 0 THOU/UL (ref 0–0.2)
BASOPHILS NFR BLD AUTO: 0 % (ref 0–2)
BENZODIAZ UR QL: NORMAL
CANNABINOIDS UR QL SCN: NORMAL
COCAINE UR QL: NORMAL
CREAT UR-MCNC: 44 MG/DL
EOSINOPHIL # BLD AUTO: 0 THOU/UL (ref 0–0.4)
EOSINOPHIL NFR BLD AUTO: 0 % (ref 0–6)
ERYTHROCYTE [DISTWIDTH] IN BLOOD BY AUTOMATED COUNT: 15.4 % (ref 11–14.5)
GLUCOSE UR STRIP-MCNC: NEGATIVE MG/DL
HBV SURFACE AG SERPL QL IA: NEGATIVE
HCG UR QL: POSITIVE
HCT VFR BLD AUTO: 35.6 % (ref 35–47)
HGB BLD-MCNC: 11.3 G/DL (ref 12–16)
HIV 1+2 AB+HIV1 P24 AG SERPL QL IA: NEGATIVE
IMM GRANULOCYTES # BLD: 0 THOU/UL
IMM GRANULOCYTES NFR BLD: 1 %
KETONES UR STRIP-MCNC: ABNORMAL MG/DL
LYMPHOCYTES # BLD AUTO: 1.5 THOU/UL (ref 0.8–4.4)
LYMPHOCYTES NFR BLD AUTO: 20 % (ref 20–40)
MCH RBC QN AUTO: 27.3 PG (ref 27–34)
MCHC RBC AUTO-ENTMCNC: 31.7 G/DL (ref 32–36)
MCV RBC AUTO: 86 FL (ref 80–100)
MONOCYTES # BLD AUTO: 0.4 THOU/UL (ref 0–0.9)
MONOCYTES NFR BLD AUTO: 6 % (ref 2–10)
NEUTROPHILS # BLD AUTO: 5.6 THOU/UL (ref 2–7.7)
NEUTROPHILS NFR BLD AUTO: 74 % (ref 50–70)
OPIATES UR QL SCN: NORMAL
OXYCODONE UR QL: NORMAL
PCP UR QL SCN: NORMAL
PLATELET # BLD AUTO: 328 THOU/UL (ref 140–440)
PMV BLD AUTO: 10.6 FL (ref 8.5–12.5)
RBC # BLD AUTO: 4.14 MILL/UL (ref 3.8–5.4)
WBC: 7.6 THOU/UL (ref 4–11)

## 2020-12-15 ENCOUNTER — HOSPITAL ENCOUNTER (OUTPATIENT)
Dept: ULTRASOUND IMAGING | Facility: HOSPITAL | Age: 25
Discharge: HOME | End: 2020-12-15
Attending: PHYSICIAN ASSISTANT

## 2020-12-15 DIAGNOSIS — Z3A.01 LESS THAN 8 WEEKS GESTATION OF PREGNANCY: ICD-10-CM

## 2020-12-15 DIAGNOSIS — Z32.01 PREGNANCY TEST POSITIVE: ICD-10-CM

## 2020-12-15 LAB
ABO/RH(D): NORMAL
ABORH REPEAT: NORMAL
ANTIBODY SCREEN: NEGATIVE
BACTERIA SPEC CULT: NORMAL
RUBV IGG SERPL QL IA: POSITIVE
T PALLIDUM AB SER QL: NEGATIVE

## 2020-12-16 LAB
COLLECTION METHOD: NORMAL
LEAD BLD-MCNC: NORMAL UG/DL
LEAD BLDV-MCNC: <2 UG/DL

## 2021-01-29 ENCOUNTER — PRENATAL OFFICE VISIT - HEALTHEAST (OUTPATIENT)
Dept: FAMILY MEDICINE | Facility: CLINIC | Age: 26
End: 2021-01-29

## 2021-01-29 DIAGNOSIS — Z34.80 SUPERVISION OF OTHER NORMAL PREGNANCY, ANTEPARTUM: ICD-10-CM

## 2021-01-29 LAB
ALBUMIN UR-MCNC: NEGATIVE MG/DL
APPEARANCE UR: CLEAR
BACTERIA #/AREA URNS HPF: ABNORMAL HPF
BILIRUB UR QL STRIP: NEGATIVE
COLOR UR AUTO: YELLOW
GLUCOSE UR STRIP-MCNC: NEGATIVE MG/DL
HGB UR QL STRIP: ABNORMAL
KETONES UR STRIP-MCNC: ABNORMAL MG/DL
LEUKOCYTE ESTERASE UR QL STRIP: ABNORMAL
MUCOUS THREADS #/AREA URNS LPF: ABNORMAL LPF
NITRATE UR QL: NEGATIVE
PH UR STRIP: 6 [PH] (ref 5–8)
RBC #/AREA URNS AUTO: ABNORMAL HPF
SP GR UR STRIP: >=1.03 (ref 1–1.03)
SQUAMOUS #/AREA URNS AUTO: ABNORMAL LPF
UROBILINOGEN UR STRIP-ACNC: ABNORMAL
WBC #/AREA URNS AUTO: ABNORMAL HPF

## 2021-01-29 ASSESSMENT — MIFFLIN-ST. JEOR: SCORE: 1134.17

## 2021-02-26 ENCOUNTER — PRENATAL OFFICE VISIT - HEALTHEAST (OUTPATIENT)
Dept: FAMILY MEDICINE | Facility: CLINIC | Age: 26
End: 2021-02-26

## 2021-02-26 DIAGNOSIS — Z34.80 SUPERVISION OF OTHER NORMAL PREGNANCY, ANTEPARTUM: ICD-10-CM

## 2021-02-26 ASSESSMENT — MIFFLIN-ST. JEOR: SCORE: 1152.39

## 2021-03-01 LAB
# FETUSES US: NORMAL
AFP MOM SERPL: 1.17
AFP SERPL-MCNC: 63 NG/ML
AGE - REPORTED: 24.9 YR
CURRENT SMOKER: NO
FAMILY MEMBER DISEASES HX: NO
GA METHOD: NORMAL
GA: NORMAL WK
HCG MOM SERPL: 0.65
HCG SERPL-ACNC: NORMAL IU/L
HX OF HEREDITARY DISORDERS: NO
IDDM PATIENT QL: NO
INHIBIN A MOM SERPL: 0.71
INHIBIN A SERPL-MCNC: 114 PG/ML
INTEGRATED SCN PATIENT-IMP: NORMAL
PATHOLOGY STUDY: NORMAL
SPECIMEN DRAWN SERPL: NORMAL
U ESTRIOL MOM SERPL: 1.14
U ESTRIOL SERPL-MCNC: 2.27 NG/ML

## 2021-03-12 ENCOUNTER — HOSPITAL ENCOUNTER (OUTPATIENT)
Dept: ULTRASOUND IMAGING | Facility: HOSPITAL | Age: 26
Discharge: HOME | End: 2021-03-12
Attending: FAMILY MEDICINE

## 2021-03-26 ENCOUNTER — OFFICE VISIT - HEALTHEAST (OUTPATIENT)
Dept: FAMILY MEDICINE | Facility: CLINIC | Age: 26
End: 2021-03-26

## 2021-03-26 DIAGNOSIS — Z34.80 SUPERVISION OF OTHER NORMAL PREGNANCY, ANTEPARTUM: ICD-10-CM

## 2021-03-29 ENCOUNTER — COMMUNICATION - HEALTHEAST (OUTPATIENT)
Dept: FAMILY MEDICINE | Facility: CLINIC | Age: 26
End: 2021-03-29

## 2021-04-05 ENCOUNTER — COMMUNICATION - HEALTHEAST (OUTPATIENT)
Dept: FAMILY MEDICINE | Facility: CLINIC | Age: 26
End: 2021-04-05

## 2021-05-05 ENCOUNTER — PRENATAL OFFICE VISIT - HEALTHEAST (OUTPATIENT)
Dept: FAMILY MEDICINE | Facility: CLINIC | Age: 26
End: 2021-05-05

## 2021-05-05 DIAGNOSIS — Z34.80 SUPERVISION OF OTHER NORMAL PREGNANCY, ANTEPARTUM: ICD-10-CM

## 2021-05-05 LAB
ALBUMIN UR-MCNC: ABNORMAL G/DL
FASTING STATUS PATIENT QL REPORTED: NO
GLUCOSE 1H P 50 G GLC PO SERPL-MCNC: 100 MG/DL (ref 70–139)
GLUCOSE UR STRIP-MCNC: NEGATIVE MG/DL
HGB BLD-MCNC: 9.3 G/DL (ref 12–16)
KETONES UR STRIP-MCNC: NEGATIVE MG/DL

## 2021-05-06 LAB — T PALLIDUM AB SER QL: NEGATIVE

## 2021-05-26 VITALS
SYSTOLIC BLOOD PRESSURE: 92 MMHG | OXYGEN SATURATION: 98 % | RESPIRATION RATE: 16 BRPM | DIASTOLIC BLOOD PRESSURE: 60 MMHG | TEMPERATURE: 97.6 F | HEART RATE: 78 BPM

## 2021-05-27 VITALS
WEIGHT: 120 LBS | HEART RATE: 84 BPM | SYSTOLIC BLOOD PRESSURE: 100 MMHG | OXYGEN SATURATION: 99 % | RESPIRATION RATE: 16 BRPM | DIASTOLIC BLOOD PRESSURE: 64 MMHG

## 2021-05-30 ENCOUNTER — RECORDS - HEALTHEAST (OUTPATIENT)
Dept: ADMINISTRATIVE | Facility: CLINIC | Age: 26
End: 2021-05-30

## 2021-05-30 NOTE — TELEPHONE ENCOUNTER
Patient was born in [facility]. Will need TB gold. Patient has not been seen since 11/2017. Please make an appointment for patient to come in for a px.

## 2021-05-30 NOTE — TELEPHONE ENCOUNTER
[facility] Left voicemail for patient at [phone] that I cancelled patients appointment with Dr Isaac. Patient needs a pregnancy confirmation appointment with Meghan louis.     Please help patient schedule an appointment with Meghan Null for a pregnancy confirmation using this visit type \"First Ob.\" Thanks!      "

## 2021-05-30 NOTE — PROGRESS NOTES
FEMALE PREVENTATIVE EXAM    Assessment and Plan:     1. Routine general medical examination at a health care facility  Chlamydia trachomatis & Neisseria gonorrhoeae, Amplified Detection    HIV Antigen/Antibody Screening Cascade   2. Congenital Scoliosis     3. Acne Vulgaris     4. Abdominal pain, epigastric  US Abdomen Complete    Urinalysis    Culture, Urine    Amylase    Lipase    Comprehensive Metabolic Panel    HM1(CBC and Differential)    HM1 (CBC with Diff)   5. Need for tetanus booster  Tdap vaccine,  6yo or older,  IM     This is a 22 yo female here for physical exam:  1.  Health Maintenance - up to date on cervical cancer screening; due for STD screening with HIV screening per recommendations.  These were done. Also - patient is due for tetanus booster - will order/give today.  2.  Congenital scoliosis - no new symptoms - no significant worsening by patient report  3.  Acne - not using any medications currently  4.  Abdominal epigastric pain - ? Gallbladder?  Will check abdominal ultrasound , and labs - will follow up if any abnormals    Next follow up:  Return in about 1 year (around 7/29/2020).    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    Subjective:   Chief Complaint: Alvino Perkins is an 23 y.o. female here for a preventative health visit.     HPI:    Doing okay - no specific complaints  Sexually active - doesn't want birth control  Never been pregnant    Scoliosis - gets some back pain mukul at time of periods  If standing too long, can get back pain, but usually doesn't bother too much    Epigastric/ruq pain - mukul after eating (doesn't matter what)      Healthy Habits  Are you taking a daily aspirin? No  Do you typically  exercising at least 40 min, 3-4 times per week?  Yes  Do you usually eat at least 4 servings of fruit and vegetables a day, include whole grains and fiber and avoid regularly eating high fat foods? Yes  Have you had an eye exam in the past two years? NO  Do you see a dentist twice per year? Yes  Do you have any concerns regarding sleep? No    Safety Screen  If you own firearms, are they secured in a locked gun cabinet or with trigger locks? NO  Do you feel you are safe where you are living?: Yes (7/29/2019  9:58 AM)  Do you feel you are safe in your relationship(s)?: Yes (7/29/2019  9:58 AM)      Review of Systems:  Please see above.  The rest of the review of systems are negative for all systems.     Pap History:   No - age 21-29 PAP every 3 years recommended  Cancer Screening       Status Date      PAP SMEAR Next Due 11/1/2020      Done 11/1/2017 GYNECOLOGIC CYTOLOGY (PAP SMEAR)          Patient Care Team:  Zenon Lebron MD as PCP - General        History     Reviewed By Date/Time Sections Reviewed    Ramya Cisneros MD 7/29/2019 10:42 AM Medical, Surgical, Tobacco, Alcohol, Drug Use, Sexual Activity, Family    Jen Palomino CMA 7/29/2019 10:01 AM Tobacco, Alcohol, Drug Use, Sexual Activity            Objective:   Vital Signs:   Visit Vitals  BP 90/60 (Patient Site: Right Arm, Patient Position: Sitting, Cuff Size: Adult Small)   Pulse 78   Temp 98.4  F (36.9  C) (Oral)   Resp 16   Ht 5' (1.524 m)   Wt (!) 97 lb 12.8 oz (44.4 kg)   LMP 07/06/2019 (Within Days)   SpO2 98% Comment: ra   Breastfeeding? No   BMI 19.10 kg/m           PHYSICAL EXAM  EXAM:  BP 90/60 (Patient Site: Right Arm, Patient Position: Sitting, Cuff Size: Adult Small)   Pulse 78   Temp 98.4  F (36.9  C) (Oral)   Resp 16   Ht 5' (1.524 m)   Wt (!) 97 lb 12.8 oz (44.4 kg)   LMP 07/06/2019 (Within Days)   SpO2 98% Comment: ra  Breastfeeding? No   BMI 19.10 kg/m     Gen:  NAD, appears well, well-hydrated  HEENT:  Connie hall,  oropharynx benign, nasal mucosa nl, conjunctiva clear  Neck:  Supple, no adenopathy, no thyromegaly, no carotid bruits, no JVD  Lungs:  Clear to auscultation bilaterally  Cor:  RRR no murmur  Abd:  Soft, nontender, BS+, no masses, no guarding or rebound, no HSM  Extr:  Neg., significant deformities  Neuro:  No asymmetry, Nl motor tone/strength, nl sensation, reflexes =, gait nl, nl coordination, CN intact,   Skin:  Warm/dry               Medication List      as of 7/29/2019 11:59 PM     You have not been prescribed any medications.         Additional Screenings Completed Today:

## 2021-05-30 NOTE — PROGRESS NOTES
Chief Complaint:  Chief Complaint   Patient presents with     Initial Prenatal Visit     PREGNANCY CONFIRMATION     HPI:   Artie Rapp is a 23 y.o. female is here for pregnancy intake.  She is .  Patient's last menstrual period was 2019 (within weeks).  Last delivery 18.  Breastfeeding for about 2 weeks.  No birth control.  Had a few normal periods before LMP.  Positive home pregnancy test in April.    Past medical history: reviewed and updated.  Past Medical History:   Diagnosis Date     Vaginal delivery 2018    Unmedicated.  srom of clear fluid.  Delivered quickly after this.  Left labial laceration repaired.       History reviewed. No pertinent surgical history.    Current Outpatient Medications:      prenat.vits,eitan,min-iron-folic Tab, Take 1 tablet by mouth daily., Disp: 100 each, Rfl: 3    Social:  Social History     Tobacco Use     Smoking status: Never Smoker     Smokeless tobacco: Never Used     Tobacco comment: no passive exposure   Substance Use Topics     Alcohol use: No     Drug use: No       OBJECTIVE:  No Known Allergies  Vitals:    19 1033   BP: 92/54   Pulse: 76   Resp: 16     Body mass index is 21.96 kg/m .    Vital signs stable as recorded above  General: Patient is alert and oriented x 3, in no apparent distress  Fetal Exam: Fundal height was palpable at 14 cm, fetal heart tones are heard at 145 bpm.  Patient reports no fetal movement.    Results:  Results for orders placed or performed in visit on 19   Culture, Urine   Result Value Ref Range    Culture No Growth    Pregnancy (Beta-hCG, Qual), Urine   Result Value Ref Range    Pregnancy Test, Urine Positive (!) Negative   ABO/RH Typing (OP order)   Result Value Ref Range    HML ABO/Rh Typing B POS     HML ABO/Rh Repeat Typing B POS    Hepatitis B Surface antigen (HBsAG)   Result Value Ref Range    Hepatitis B Surface Ag Negative Negative   HIV Antigen/Antibody Screening Cascade   Result Value Ref Range    HIV  Antigen / Antibody Negative Negative   HML Antibody Screen   Result Value Ref Range    HML Antibody Screen Negative Negative   Lead, Blood   Result Value Ref Range    Lead  <5.0 ug/dL    Collection Method Venous     Lead Retest No    RPR   Result Value Ref Range    Treponema Antibody (Syphilis) Negative Negative   Rubella Immune Status (IgG)   Result Value Ref Range    Rubella Antibody, IgG Positive    Drugs of Abuse 1,Urine   Result Value Ref Range    Amphetamines Screen Negative Screen Negative    Benzodiazepines Screen Negative Screen Negative    Opiates Screen Negative Screen Negative    Phencyclidine Screen Negative Screen Negative    THC Screen Negative Screen Negative    Barbiturates Screen Negative Screen Negative    Cocaine Metabolite Screen Negative Screen Negative    Oxycodone Screen Negative Screen Negative    Creatinine, Urine 264.9 mg/dL   Urinalysis, OB Screen   Result Value Ref Range    Glucose, UA Negative Negative    Ketones, UA Negative Negative    Protein, UA 30 mg/dL (!) Negative mg/dL   HM1 (CBC with Diff)   Result Value Ref Range    WBC 5.6 4.0 - 11.0 thou/uL    RBC 3.64 (L) 3.80 - 5.40 mill/uL    Hemoglobin 10.8 (L) 12.0 - 16.0 g/dL    Hematocrit 33.3 (L) 35.0 - 47.0 %    MCV 92 80 - 100 fL    MCH 29.7 27.0 - 34.0 pg    MCHC 32.4 32.0 - 36.0 g/dL    RDW 13.7 11.0 - 14.5 %    Platelets 261 140 - 440 thou/uL    MPV 10.5 8.5 - 12.5 fL    Neutrophils % 71 (H) 50 - 70 %    Lymphocytes % 24 20 - 40 %    Monocytes % 5 2 - 10 %    Eosinophils % 0 0 - 6 %    Basophils % 0 0 - 2 %    Neutrophils Absolute 4.0 2.0 - 7.7 thou/uL    Lymphocytes Absolute 1.3 0.8 - 4.4 thou/uL    Monocytes Absolute 0.3 0.0 - 0.9 thou/uL    Eosinophils Absolute 0.0 0.0 - 0.4 thou/uL    Basophils Absolute 0.0 0.0 - 0.2 thou/uL   Lead, Blood, Venouos   Result Value Ref Range    Lead, Blood (Venous) <2.0 0.0 - 4.9 ug/dL     Other screening pregnancy lab work is ordered and pending.    Assessment and Plan:  1. Pregnancy Intake  Appointment.  Artie Rapp is 23 y.o. and .  Patient's last menstrual period was 2019 (within weeks).  Estimated Date of Delivery: 19  Screening pregnancy lab work was drawn.  Prenatal vitamins prescribed.  Problem list and current medications reviewed and updated.  Dating ultrasound ordered.  Prenatal info packet given.  Last delivery 18.    Follow up in 3 weeks.  Please see OB Episode for complete details.    This dictation uses voice recognition software, which may contain typographical errors.

## 2021-05-30 NOTE — TELEPHONE ENCOUNTER
New Appointment Needed  What is the reason for the visit:  TB test   Mantoux Placement  Appt Request  What is the purpose of the mantoux?:  School:   Is there a form to be completed?:   No  How soon do you need the mantoux placed?:  date: as soon as possible     Provider Preference: Any available  How soon do you need to be seen?: as soon as she can get in   Waitlist offered?: No  Okay to leave a detailed message:  Yes

## 2021-05-30 NOTE — TELEPHONE ENCOUNTER
New Appointment Needed  What is the reason for the visit:    Mantoux Placement  Appt Request  What is the purpose of the mantoux?:   School  Is there a form to be completed?:   Yes  How soon do you need the mantoux placed?:  date: As soon as able, needs before 08/02/19, also needs TDAP vaccine.    Provider Preference: Any available  How soon do you need to be seen?:  Needs completed before 08/02/19  Waitlist offered?: Yes  Okay to leave a detailed message:  Yes

## 2021-05-30 NOTE — TELEPHONE ENCOUNTER
Called patient to schedule for CSS appointment for nimo jansenmail to return phone call. If returns phone call please help schedule patient on CSS schedule. Thank you.

## 2021-05-30 NOTE — TELEPHONE ENCOUNTER
Patient Returning Call  Reason for call:  Patient returning clinic call    Information relayed to patient:  Patient received message from clinic regarding scheduling her Mantoux testing.  Patient scheduled for Tuesday 07/23 at 10:30am and Mantoux reading for Friday 07/26 at 10:00am.    Patient has additional questions:  No     If YES, what are your questions/concerns:  N/A    Okay to leave a detailed message?: No call back needed

## 2021-05-30 NOTE — TELEPHONE ENCOUNTER
#1 - Left voicemail for patient to call back and schedule a Physical with any provider. Included in voicemail that I cancelled patients Nurse only appointment for 7/23 @ 9:45am. Please schedule Physical with a Dr when patient calls back.

## 2021-05-30 NOTE — TELEPHONE ENCOUNTER
Called and scheduled appointment for patient to come and have tb test done on css schedule 7/5/19 at 9:00am. Closing encounter

## 2021-05-30 NOTE — TELEPHONE ENCOUNTER
Please call and help make appointment for patient to come in to be seen with Meghan for Pregnancy intake.

## 2021-05-30 NOTE — TELEPHONE ENCOUNTER
Who is calling:  Patient incorrectly self scheduled via [employer]  Reason for Call:    [facility] a pregnancy confirmation.  Non-clinic staff not allowed to change to the correct appointment type.  Clinic please ONLY call patient if this needs to be rescheduled.     Okay to leave a detailed message: Patient incorrectly self scheduled appointment via [employer]

## 2021-05-31 VITALS — HEIGHT: 60 IN | WEIGHT: 98 LBS | BODY MASS INDEX: 19.24 KG/M2

## 2021-05-31 NOTE — PROGRESS NOTES
Assessment/Plan:        Diagnoses and all orders for this visit:    Abdominal pain  -     Urinalysis Macroscopic  -     CT Abdomen Pelvis Without Oral Without IV Contrast; Future    Asymptomatic microscopic hematuria  -     Urine,Microscopic- Future; Future; Expected date: 09/08/2019    Stone Management Plan  [facility] Stone Management 8/9/2019   Urinary Tract Infection No suspicion of infection   Renal Colic Asymptomatic at this time   Renal Failure No suspicion of renal failure   Current CT date 8/9/2019   Right sided stones? No   R Stone Event No current event   Left sided stones? No   L Stone Event No current event         Subjective:      HPI  Ms. Alvino Perkins is a 23 y.o. Hmong female presenting to the [facility] [place] referred by PCP for nephrolithiasis.    She has no previous stone history. She has no identified modifiable stone risk factors. She has identified non-modifiable stone risks including:  early age at first stone.    She was seen by PCP office for annual exam 7/29/19 with complaint of epigastric and right upper abdominal pain. The pain was exacerbated with eating. No associated alleviating factors. Labs returned with no acute findings. An abdominal ultrasound reported a renal stone without hydronephrosis. She states the pain has been ongoing for ~ 1 month. It is stabbing in quality and will occasionally radiate from epigastric area, along lower rib margins to bilateral back. She has had associated loose stools.     She is asymptomatic at present. Pertinent negative current symptoms include:  fever, chills, right flank pain, left flank pain, nausea, vomiting, hematuria, urinary frequency and dysuria.     CT scan is personally reviewed and demonstrates no renal stones or hydronephrosis. There is a 3 mm calcification within left mid pelvis which is likely a phlebolith versus nonobstructing ureteral stone. Radiologist radiologist agreed it was a phlebolith and not a stone.    Significant labs  from presentation include mild hematuria, no pyuria, negative nitrite, few bacteria, negative urine culture, normal WBC, normal creatinine and normal potassium.    PLAN    22 yo F with no prior stone history. No current stone burden. Left mid pelvic calcification, likely a phlebolith. Epigastric pain x 1 month with change in bowel movements, etiology unclear.    Will send a micro UA to follow up on microscopic hematuria.    She will follow up with PCP/GI for further evaluation of current symptoms.     Patient also see and examined by TERRIE Gastelum   Review of Systems  A 12 point comprehensive review of systems is negative except for HPI    No past medical history on file.    No past surgical history on file.    No current outpatient medications on file.     No current facility-administered medications for this visit.      No Known Allergies    Social History     Socioeconomic History     Marital status: Single     Spouse name: Not on file     Number of children: Not on file     Years of education: Not on file     Highest education level: Not on file   Occupational History     Occupation: Nursing assistant   Social Needs     Financial resource strain: Not on file     Food insecurity:     Worry: Not on file     Inability: Not on file     Transportation needs:     Medical: Not on file     Non-medical: Not on file   Tobacco Use     Smoking status: Never Smoker     Smokeless tobacco: Never Used     Tobacco comment: No exposure to second hand smoking.   Substance and Sexual Activity     Alcohol use: No     Drug use: No     Sexual activity: Yes     Partners: Male     Birth control/protection: Condom   Lifestyle     Physical activity:     Days per week: Not on file     Minutes per session: Not on file     Stress: Not on file   Relationships     Social connections:     Talks on phone: Not on file     Gets together: Not on file     Attends [employer] service: Not on file     Active member of club or organization:  Not on file     Attends meetings of clubs or organizations: Not on file     Relationship status: Not on file     Intimate partner violence:     Fear of current or ex partner: Not on file     Emotionally abused: Not on file     Physically abused: Not on file     Forced sexual activity: Not on file   Other Topics Concern     Not on file   Social History Narrative     Not on file       Family History   Problem Relation Age of Onset     No Medical Problems Mother      No Medical Problems Father      No Medical Problems Sister      No Medical Problems Brother        Objective:      Physical Exam  Vitals:    08/09/19 0934   BP: 108/63   Pulse: 63   Temp: 98  F (36.7  C)     General - well developed, well nourished, appropriate for age. Appears no distress at this time   Heart - regular rate and rhythm, no murmur  Respiratory - normal effort, clear to auscultation, good air entry without adventitious noises  Abdomen - slender soft, non-tender, no hepatosplenomegaly, no masses.   - no flank tenderness, no suprapubic tenderness, kidney and bladder non-palpable  MSK - normal spinal curvature. no spinal tenderness. normal gait. muscular strength intact.  Neurology - cranial nerves II-XII grossly intact, normal sensation, no unsteadiness  Skin - intact, no bruising, no gouty tophi  Psych - oriented to time, place, and person, normal mood and affect.    Labs  Urinalysis POC (Office):  Nitrite, UA   Date Value Ref Range Status   08/09/2019 Negative Negative Final   07/29/2019 Negative Negative Final   08/24/2009 Negative (Negative) Final       Lab Urinalysis:  Blood, UA   Date Value Ref Range Status   08/09/2019 Moderate (!) Negative Final   07/29/2019 Small (!) Negative Final   08/24/2009 Negative (Negative) Final     Nitrite, UA   Date Value Ref Range Status   08/09/2019 Negative Negative Final   07/29/2019 Negative Negative Final   08/24/2009 Negative (Negative) Final     Leukocytes, UA   Date Value Ref Range Status    08/09/2019 Negative Negative Final   07/29/2019 Negative Negative Final   08/24/2009 Negative (Negative) Final     pH, UA   Date Value Ref Range Status   08/09/2019 6.0 5.0 - 8.0 Final   07/29/2019 6.0 5.0 - 8.0 Final   08/24/2009 5.5 (5.0-8.0) Final    and Acute Labs   CBC   WBC   Date Value Ref Range Status   07/29/2019 6.3 4.0 - 11.0 thou/uL Final   07/31/2012 4.6 4.0 - 11.0 thou/uL Final     Hemoglobin   Date Value Ref Range Status   07/29/2019 13.6 12.0 - 16.0 g/dL Final   07/31/2012 13.3 12.0 - 16.0 g/dL Final   08/24/2009 12.4 11.2 - 15.2 g/dL Final     Platelets   Date Value Ref Range Status   07/29/2019 236 140 - 440 thou/uL Final   07/31/2012 313 140 - 440 thou/uL Final   , Renal Panel  [facility]  Creatinine   Date Value Ref Range Status   07/29/2019 0.66 0.60 - 1.10 mg/dL Final     Potassium   Date Value Ref Range Status   07/29/2019 4.2 3.5 - 5.0 mmol/L Final     Calcium   Date Value Ref Range Status   07/29/2019 9.8 8.5 - 10.5 mg/dL Final    and Urine Culture    Culture   Date Value Ref Range Status   07/29/2019 No Growth  Final

## 2021-05-31 NOTE — TELEPHONE ENCOUNTER
"Called and left message for pt to call clinic back#1  \"Okay to relay message\"    ----- Message from Zenon Lebron MD sent at 7/30/2019  7:06 PM CDT -----  Please contact this patient she came in for a lab only blood test for screening for TB.  The QuantiFERON gold was negative.    No evidence of latent tuberculosis.    Letter know this and if she wants to  a copy of this please make a copy for her.    "

## 2021-05-31 NOTE — TELEPHONE ENCOUNTER
----- Message from Ramya Cisneros MD sent at 7/31/2019  3:31 PM CDT -----  I was unable to reach patient by phone - her labs are all normal but her ultrasound shows a small right kidney stone.  While this is not what I expected with her symptoms, I think it at least deserves follow up.  Please call and let her know this.  I'd be happy to get her a referral/appointment to the [facility].

## 2021-05-31 NOTE — PROGRESS NOTES
Patient presents to the office today for a consultation on upper abdominal pain and possible renal stones per ultrasound from primary care provider.

## 2021-05-31 NOTE — TELEPHONE ENCOUNTER
Patient Returning Call  Reason for call:  Patient returning clinic call    Information relayed to patient:  Patient informed of message from clinic regarding test results.  Patient will send correspondence via [employer] to Dr. Argueta.    Patient has additional questions:  No     If YES, what are your questions/concerns:  N/A    Okay to leave a detailed message?: No call back needed

## 2021-06-01 VITALS — BODY MASS INDEX: 20.56 KG/M2 | HEIGHT: 59 IN | WEIGHT: 102 LBS

## 2021-06-01 VITALS — BODY MASS INDEX: 22.05 KG/M2 | WEIGHT: 106.4 LBS

## 2021-06-01 VITALS — WEIGHT: 106.9 LBS | BODY MASS INDEX: 22.15 KG/M2

## 2021-06-01 VITALS — WEIGHT: 104 LBS | BODY MASS INDEX: 21.83 KG/M2 | HEIGHT: 58 IN

## 2021-06-01 VITALS — HEIGHT: 59 IN | WEIGHT: 105 LBS | BODY MASS INDEX: 21.17 KG/M2

## 2021-06-01 VITALS — BODY MASS INDEX: 20.78 KG/M2 | WEIGHT: 102 LBS

## 2021-06-01 NOTE — TELEPHONE ENCOUNTER
ROUTING TO [facility]. Please call patient to schedule First Ob. She has not has her First Ob appt.

## 2021-06-01 NOTE — TELEPHONE ENCOUNTER
Please call and help make appointment for patient to come in to be seen with Meghan for prenatal intake

## 2021-06-01 NOTE — TELEPHONE ENCOUNTER
Please see patient message on 7/15/19. Patient was instructed to either see OB at [facility] or [facility].     Called and left message asking if patient is planning to see [facility] or [facility] for OB care.

## 2021-06-01 NOTE — TELEPHONE ENCOUNTER
New Appointment Needed  What is the reason for the visit:    Please see request from patient below. Her provider, Dr. Carvajal is out, patient sent an appointment request via the internet, and I do not know who Dr. Carvajal's established OB patients are currently seeing. Could you please help patient with an appointent with which provider is convering for Dr. Carvajal's OB patients? Thank you!  Provider Preference: See above  How soon do you need to be seen?: ask  Waitlist offered?: N/A  Okay to leave a detailed message:  Yes  [place] Appointment Request for Artie Rapp submitted on 9/27/2019 8:11:01 PM  Name Value   Request An Appointment For   Patient First Name:  Artie Coto    Patient Last Name:  Dionte   Patient YOB: 1995   Gender:  Decline to state   Has patient visited a [place] location:  Yes   Insurance:  Insured   Insurance unknown:  False   Primary health insurance:  BCBS   How is insurance received:  [place] [employer]/[place] Assistance   Member ID number:  [id number]   Group/Policy number:  [id number]   Who is this appointment for:  Self   Preferred follow up method:  Email   Phone:  [phone]   Email:  [email or web address]   Reason for visit:  Other   Other reason for visit:  OB Appointment, I am Currently 30 weeks pregnant. I usually go to the [facility] to see doctor Wei, but she is currently out. They told me all the doctors at [facility] are full.    Preferred doctor/provider Female provider   Preferred clinic [facility]   Referring webpage:  [email or web address]

## 2021-06-01 NOTE — TELEPHONE ENCOUNTER
Patient Returning Call  Reason for call:  Patient returned call, states she wants to do her ongoing OB care at the [facility], did do her Initial OB with Meghan Null 7/02/19, and wants to continue care there.    Please call her to schedule appointments.

## 2021-06-02 VITALS — WEIGHT: 116.75 LBS | BODY MASS INDEX: 24.19 KG/M2

## 2021-06-02 VITALS — BODY MASS INDEX: 25.45 KG/M2 | WEIGHT: 122.8 LBS

## 2021-06-02 VITALS — WEIGHT: 112.6 LBS | BODY MASS INDEX: 23.33 KG/M2

## 2021-06-02 VITALS — BODY MASS INDEX: 26.24 KG/M2 | WEIGHT: 125 LBS | HEIGHT: 58 IN

## 2021-06-02 VITALS — WEIGHT: 125.5 LBS | BODY MASS INDEX: 26.35 KG/M2 | HEIGHT: 58 IN

## 2021-06-02 VITALS — BODY MASS INDEX: 25.33 KG/M2 | WEIGHT: 122.25 LBS

## 2021-06-02 NOTE — PROGRESS NOTES
PRENATAL VISIT   FIRST OBSTETRICAL EXAM - OB    Assessment / Impression     insufficient prenatal care, with large gap in care.    Closely spaced pregnancy.    Normal first prenatal visit at 32w1d  Discussed orientation, general information, lifestyle, nutrition, exercise,warning signs, resources, lab testing, risk screening and discussed cystic fibrosis screening with patient.  Questions answered.  Wants to do breast and bottle.   She has to go back to work 3 days after delivery.    Plan:     Note written to have more time off her schooling following delivery . A minimum of 3 weeks.     Initial labs reviewed.   Obtain gc/chlam today  1 hour glucose done today  tdap done today    Reviewed signs of labor and when to go to the hospital.    Prenatal vitamins, reviewed high iron diet.    Recheck hgb at 36 weeks.    Problem list reviewed and updated.  Declines birth control  Reviewed baby cares including skin to skin, delayed cord clamping, encouraged breastfeeding, vitamin k, hepatitis b vaccine, eye ointment, 24 hour testing  Exclusive breastfeeding for the first 2 weeks was encouraged today, as she says her last baby would not latch.      Follow up: No follow-ups on file.      Subjective:    Pa Nnamdi Rapp is a 24 y.o.  here today for her First Obstetrical Exam. Doing well.  No problems. She is currently 32w1d.  She is having Yung Landry contractions.  She denies any painful contractions.  She denies any bleeding, cramping, leaking of water.  She is feeling her baby move well.    OB History    Para Term  AB Living   2 1 1 0 0 1   SAB TAB Ectopic Multiple Live Births   0 0 0 0 1      # Outcome Date GA Lbr Markos/2nd Weight Sex Delivery Anes PTL Lv   2 Current            1 Term 18 38w0d 02:25 / 00:05 5 lb 0.4 oz (2.28 kg) F Vag-Spont Local N CHRIS       Expected Date of Delivery: 2019, by Last Menstrual Period    Past Medical History:   Diagnosis Date     Vaginal delivery 2018    Unmedicated.   "srom of clear fluid.  Delivered quickly after this.  Left labial laceration repaired.       No past surgical history on file.  Social History     Tobacco Use     Smoking status: Never Smoker     Smokeless tobacco: Never Used     Tobacco comment: no passive exposure   Substance Use Topics     Alcohol use: No     Drug use: No     Current Outpatient Medications   Medication Sig Dispense Refill     prenat.vits,eitan,min-iron-folic Tab Take 1 tablet by mouth daily. 100 each 3     No current facility-administered medications for this visit.      No Known Allergies          High Risk Behavior: Last birth within 1 year    Review of Systems  General:  Denies problem  Eyes: Denies problem  Ears/Nose/Throat: Denies problem  Cardiovascular: Denies problem  Respiratory:  Denies problem  Gastrointestinal:  Denies problem, Genitourinary: Denies problem  Musculoskeletal:  Denies problem  Skin: Denies problem  Neurologic: Denies problem  Psychiatric: Denies problem  Endocrine: Denies problem  Heme/Lymphatic: Denies problem   Allergic/Immunologic: Denies problem       Objective:   Objective    Vitals:    10/11/19 1525   BP: 102/64   Pulse: 79   Temp: 98.1  F (36.7  C)   TempSrc: Oral   SpO2: 99%   Weight: 118 lb 4 oz (53.6 kg)   Height: 4' 10.27\" (1.48 m)     Physical Exam:  General Appearance: Alert, cooperative, no distress, appears stated age  Head: Normocephalic, without obvious abnormality, atraumatic  Eyes: PERRL, conjunctiva/corneas clear, EOM's intact  Ears: Normal TM's and external ear canals, both ears  Nose: Nares normal, septum midline,mucosa normal, no drainage  Throat: Lips, mucosa, and tongue normal; teeth and gums normal  Neck: Supple, symmetrical, trachea midline, no adenopathy;  thyroid: not enlarged, symmetric, no tenderness/mass/nodules; no carotid bruit or JVD  Back: Symmetric, no curvature, ROM normal, no CVA tenderness  Lungs: Clear to auscultation bilaterally, respirations unlabored  Breasts: No breast masses, " tenderness, asymmetry, or nipple discharge.  Heart: Regular rate and rhythm, S1 and S2 normal, no murmur, rub, or gallop, Abdomen: Soft, non-tender, bowel sounds active all four quadrants,  no masses, no organomegaly  Pelvic:Not examined  Extremities: Extremities normal, atraumatic, no cyanosis or edema  Skin: Skin color, texture, turgor normal, no rashes or lesions  Lymph nodes: Cervical, supraclavicular, and axillary nodes normal  Neurologic: Normal     Lab:   Results for orders placed or performed in visit on 07/05/19   QTF-Mycobacterium tuberculosis by QuantiFERON-TB Gold Plus   Result Value Ref Range    QTF RESULT Negative Negative    QTF INTREPRETATION       No interferon-gamma response to M. tuberculosis antigens was detected.  Infecton with M. tuberculosis is unlikely.  A negative result alone does not exclude infection with M. tuberculosis    QTF NIL 0.05 IU/mL    QTF ANTIGEN TB1-NIL 0.00 IU/mL    QTF ANTIGEN TB2 - NIL -0.01 IU/mL    QTF MITOGEN-NIL 5.61 IU/mL

## 2021-06-02 NOTE — TELEPHONE ENCOUNTER
appt made for 10/11/19 @ 3:20. Talked to Dr. Arriaga okay to use blocks since pt is so far along in pregnancy and has not seen anyone for her OB care. Pt is aware that it may or may not be Dr. Arriaga who pt will stay under, but we will decide that later if needed after reviewing the OB spreadsheet.

## 2021-06-03 VITALS
HEART RATE: 85 BPM | RESPIRATION RATE: 14 BRPM | WEIGHT: 100 LBS | OXYGEN SATURATION: 99 % | BODY MASS INDEX: 19.53 KG/M2 | SYSTOLIC BLOOD PRESSURE: 110 MMHG | DIASTOLIC BLOOD PRESSURE: 71 MMHG | TEMPERATURE: 98.2 F

## 2021-06-03 VITALS
RESPIRATION RATE: 20 BRPM | SYSTOLIC BLOOD PRESSURE: 90 MMHG | WEIGHT: 120 LBS | OXYGEN SATURATION: 99 % | BODY MASS INDEX: 25.19 KG/M2 | HEIGHT: 58 IN | TEMPERATURE: 98.1 F | DIASTOLIC BLOOD PRESSURE: 62 MMHG | HEART RATE: 71 BPM

## 2021-06-03 VITALS — WEIGHT: 97 LBS | BODY MASS INDEX: 18.94 KG/M2

## 2021-06-03 VITALS — BODY MASS INDEX: 21.96 KG/M2 | WEIGHT: 106 LBS

## 2021-06-03 VITALS
HEIGHT: 58 IN | BODY MASS INDEX: 24.82 KG/M2 | SYSTOLIC BLOOD PRESSURE: 102 MMHG | TEMPERATURE: 98.1 F | DIASTOLIC BLOOD PRESSURE: 64 MMHG | OXYGEN SATURATION: 99 % | HEART RATE: 79 BPM | WEIGHT: 118.25 LBS

## 2021-06-03 VITALS — WEIGHT: 97.8 LBS | BODY MASS INDEX: 19.2 KG/M2 | HEIGHT: 60 IN

## 2021-06-03 NOTE — TELEPHONE ENCOUNTER
----- Message from Juana Overton MD sent at 11/12/2019  6:04 PM CST -----  Please call pt with an  and let them know that their lab results show a low hemoglobin.  I am going to add in some iron tabletes for her to take as we discussed.

## 2021-06-03 NOTE — PROGRESS NOTES
She is not eating well.  She has some pain if she eats too much.  Advised small frequent meals.    Reviewed when to go to the hospital.  Reviewed baby cares including skin to skin, delayed cord clamping, encouraged breastfeeding, vitamin k, hepatitis b vaccine, eye ointment, 24 hour testing  Reviewed negative GBS.

## 2021-06-03 NOTE — PROGRESS NOTES
Feels like baby has dropped. s he feels more vaginal pressure.  If no growth in fundal height at next visit, then obtain US for growth.   Reviewed kick counts  hgb and GBS done today.  Reviewed when to go to the hospital.    Review baby cares at next visit.

## 2021-06-04 ENCOUNTER — PRENATAL OFFICE VISIT - HEALTHEAST (OUTPATIENT)
Dept: FAMILY MEDICINE | Facility: CLINIC | Age: 26
End: 2021-06-04

## 2021-06-04 VITALS
WEIGHT: 110.25 LBS | RESPIRATION RATE: 16 BRPM | HEART RATE: 60 BPM | DIASTOLIC BLOOD PRESSURE: 52 MMHG | HEIGHT: 59 IN | BODY MASS INDEX: 22.23 KG/M2 | SYSTOLIC BLOOD PRESSURE: 94 MMHG

## 2021-06-04 VITALS
WEIGHT: 125 LBS | TEMPERATURE: 98.1 F | OXYGEN SATURATION: 99 % | SYSTOLIC BLOOD PRESSURE: 110 MMHG | BODY MASS INDEX: 26.24 KG/M2 | RESPIRATION RATE: 20 BRPM | DIASTOLIC BLOOD PRESSURE: 54 MMHG | HEART RATE: 66 BPM | HEIGHT: 58 IN

## 2021-06-04 VITALS
DIASTOLIC BLOOD PRESSURE: 58 MMHG | WEIGHT: 126 LBS | HEART RATE: 66 BPM | SYSTOLIC BLOOD PRESSURE: 92 MMHG | BODY MASS INDEX: 26.45 KG/M2 | OXYGEN SATURATION: 99 % | HEIGHT: 58 IN | RESPIRATION RATE: 16 BRPM | TEMPERATURE: 97.8 F

## 2021-06-04 VITALS
TEMPERATURE: 98.3 F | OXYGEN SATURATION: 99 % | HEART RATE: 86 BPM | BODY MASS INDEX: 25.82 KG/M2 | SYSTOLIC BLOOD PRESSURE: 104 MMHG | WEIGHT: 123 LBS | RESPIRATION RATE: 16 BRPM | HEIGHT: 58 IN | DIASTOLIC BLOOD PRESSURE: 50 MMHG

## 2021-06-04 DIAGNOSIS — D50.9 IRON DEFICIENCY ANEMIA, UNSPECIFIED IRON DEFICIENCY ANEMIA TYPE: ICD-10-CM

## 2021-06-04 DIAGNOSIS — Z34.80 SUPERVISION OF OTHER NORMAL PREGNANCY, ANTEPARTUM: ICD-10-CM

## 2021-06-04 LAB
ALBUMIN UR-MCNC: NEGATIVE G/DL
GLUCOSE UR STRIP-MCNC: NEGATIVE MG/DL
KETONES UR STRIP-MCNC: NEGATIVE MG/DL

## 2021-06-04 RX ORDER — FERROUS GLUCONATE 324(38)MG
324 TABLET ORAL
Qty: 90 TABLET | Refills: 0 | Status: SHIPPED | OUTPATIENT
Start: 2021-06-04 | End: 2022-06-08

## 2021-06-04 RX ORDER — SWAB
SWAB, NON-MEDICATED MISCELLANEOUS
Status: SHIPPED | COMMUNITY
Start: 2021-05-26 | End: 2022-02-15

## 2021-06-04 NOTE — TELEPHONE ENCOUNTER
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  Thanks.

## 2021-06-05 VITALS
OXYGEN SATURATION: 94 % | TEMPERATURE: 97.9 F | DIASTOLIC BLOOD PRESSURE: 69 MMHG | HEIGHT: 58 IN | SYSTOLIC BLOOD PRESSURE: 110 MMHG | WEIGHT: 109 LBS | BODY MASS INDEX: 22.88 KG/M2 | HEART RATE: 121 BPM

## 2021-06-05 VITALS
WEIGHT: 109.25 LBS | BODY MASS INDEX: 23.64 KG/M2 | HEART RATE: 76 BPM | RESPIRATION RATE: 16 BRPM | DIASTOLIC BLOOD PRESSURE: 62 MMHG | SYSTOLIC BLOOD PRESSURE: 94 MMHG

## 2021-06-05 VITALS
TEMPERATURE: 98.1 F | HEART RATE: 64 BPM | SYSTOLIC BLOOD PRESSURE: 90 MMHG | BODY MASS INDEX: 23.53 KG/M2 | DIASTOLIC BLOOD PRESSURE: 57 MMHG | RESPIRATION RATE: 12 BRPM | WEIGHT: 112.08 LBS | HEIGHT: 58 IN

## 2021-06-05 VITALS — WEIGHT: 110 LBS | BODY MASS INDEX: 23.73 KG/M2 | HEIGHT: 57 IN

## 2021-06-09 NOTE — TELEPHONE ENCOUNTER
----- Message from Zenon Lebron MD sent at 7/18/2020  9:55 AM CDT -----  Please contact this patient, let her know that the QuantiFERON gold blood test was negative, no evidence of TB.  See if she wants a copy of the report

## 2021-06-09 NOTE — TELEPHONE ENCOUNTER
"Left Message for patient to return call.  \"Okay to relay message\"       Unable to reach the patient after multiple attempts over the last few days.    Please advise.   "

## 2021-06-09 NOTE — TELEPHONE ENCOUNTER
I think you should leave a voice message if you can, and also send her a copy of the result in the mail

## 2021-06-10 NOTE — TELEPHONE ENCOUNTER
New Appointment Needed  What is the reason for the visit:    Pregnancy Confirmation Appt Needed  When was the first day of your last menstrual cycle?: 07/01/20  Have you done a home pregnancy test?: Yes: positive 08/02/20.    Provider Preference: PCP only  How soon do you need to be seen?: as available   Waitlist offered?: No  Okay to leave a detailed message:  Yes

## 2021-06-10 NOTE — PATIENT INSTRUCTIONS - HE
Pregnancy: about 8 weeks    Due Date: April 7, 2021    Next visit in 6 weeks  With Dr. Carvajal  For Your First OB Visit

## 2021-06-11 NOTE — ANESTHESIA CARE TRANSFER NOTE
Last vitals:   Vitals:    09/22/20 1046   BP: 104/50   Pulse: 84   Resp: 16   Temp: 36.3  C (97.3  F)   SpO2: 100%     Patient's level of consciousness is drowsy  Spontaneous respirations: yes  Maintains airway independently: yes  Dentition unchanged: yes  Oropharynx: oropharynx clear of all foreign objects    QCDR Measures:  ASA# 20 - Surgical Safety Checklist: WHO surgical safety checklist completed prior to induction    PQRS# 430 - Adult PONV Prevention: 4558F - Pt received => 2 anti-emetic agents (different classes) preop & intraop  ASA# 8 - Peds PONV Prevention: NA - Not pediatric patient, not GA or 2 or more risk factors NOT present  PQRS# 424 - Shaina-op Temp Management: 4559F - At least one body temp DOCUMENTED => 35.5C or 95.9F within required timeframe  PQRS# 426 - PACU Transfer Protocol: - Transfer of care checklist used  ASA# 14 - Acute Post-op Pain: ASA14B - Patient did NOT experience pain >= 7 out of 10

## 2021-06-11 NOTE — ANESTHESIA PREPROCEDURE EVALUATION
Anesthesia Evaluation      Patient summary reviewed   No history of anesthetic complications     Airway   Mallampati: II   Pulmonary - negative ROS and normal exam                          Cardiovascular - negative ROS and normal exam  Rhythm: regular  Rate: normal,         Neuro/Psych - negative ROS     Endo/Other - negative ROS      GI/Hepatic/Renal - negative ROS      Other findings: 9/19 COVID negative      Dental - normal exam                        Anesthesia Plan  Planned anesthetic: MAC  Versed/fent  propofol ggt  Decadron/zofran  ASA 1     Anesthetic plan and risks discussed with: patient    Post-op plan: routine recovery

## 2021-06-11 NOTE — TELEPHONE ENCOUNTER
I spoke with patient on 8/26/20 regarding OB U/S.  Dating at 7+ 1 wks, no FHR.  She was sure on her LMP.  Should have been about 8 weeks at this time.  Discussed findings are concerning for SAB.    No pain or spotting right now.  Discussed what to expect and red flags if she does start bleeding.  Blood type B POS.  Plan on monitoring with Beta Hcg as pt chooses, and f/u U/S in 1-2 weeks.  She will notify us sooner if se has any further concerns or questions.  All her questions answered during our phone call.

## 2021-06-11 NOTE — PROGRESS NOTES
Chief Complaint:  Chief Complaint   Patient presents with     preg intake     3rd preg. pref. female     HPI:   Artie Rapp is a 25 y.o. female is here for pregnancy intake.  She is .  Patient's last menstrual period was 2020 (exact date).  Positive home pregnancy test on 20.  Last delivery 19.    Past medical history: reviewed and updated.  Past Medical History:   Diagnosis Date     Vaginal delivery 2018    Unmedicated.  srom of clear fluid.  Delivered quickly after this.  Left labial laceration repaired.       Vaginal delivery 2019    Delivered on the toilet.  Coupling noted.  arom of clear fluid.  No meds.  Received cytotec and pitocin for bleeding.       History reviewed. No pertinent surgical history.    Current Outpatient Medications:      docusate sodium (COLACE) 100 MG capsule, Take 1 capsule (100 mg total) by mouth daily., Disp: 30 capsule, Rfl: 0     ferrous sulfate 325 (65 FE) MG tablet, Take 1 tablet (325 mg total) by mouth daily., Disp: 90 tablet, Rfl: 3     prenat.vits,eitan,min-iron-folic Tab, Take 1 tablet by mouth daily., Disp: 100 each, Rfl: 3    Social:  Social History     Tobacco Use     Smoking status: Never Smoker     Smokeless tobacco: Never Used     Tobacco comment: no passive exposure   Substance Use Topics     Alcohol use: No     Drug use: No       OBJECTIVE:  No Known Allergies  Vitals:    20 1303   BP: 94/52   Pulse: 60   Resp: 16     Body mass index is 22.65 kg/m .    Vital signs stable as recorded above  General: Patient is alert and oriented x 3, in no apparent distress  Remainder of physical exam deferred to patient's First OB Visit.    Results:  Results for orders placed or performed in visit on 20   Culture, Urine    Specimen: Urine   Result Value Ref Range    Culture No Growth    Chlamydia/gonorrhoeae, URINE    Specimen: Urine, Voided; Body Fluid   Result Value Ref Range    Chlamydia trachomatis, Amplified Detection Negative Negative     Neisseria gonorrhoeae, Amplified Detection Negative Negative   Pregnancy (Beta-hCG, Qual), Urine   Result Value Ref Range    Pregnancy Test, Urine Positive (!) Negative   ABO/RH Typing (OP order)   Result Value Ref Range    HML ABO/Rh Typing B POS     HML ABO/Rh Repeat Typing B POS    Hepatitis B Surface antigen (HBsAG)   Result Value Ref Range    Hepatitis B Surface Ag Negative Negative   HIV Antigen/Antibody Screening Cascade   Result Value Ref Range    HIV Antigen / Antibody Negative Negative   HML Antibody Screen   Result Value Ref Range    HML Antibody Screen Negative Negative   Lead, Blood   Result Value Ref Range    Lead      Collection Method Venous    RPR   Result Value Ref Range    Treponema Antibody (Syphilis) Negative Negative   Rubella Immune Status (IgG)   Result Value Ref Range    Rubella Antibody, IgG Positive    Drugs of Abuse 1,Urine   Result Value Ref Range    Amphetamines Screen Negative Screen Negative    Benzodiazepines Screen Negative Screen Negative    Opiates Screen Negative Screen Negative    Phencyclidine Screen Negative Screen Negative    THC Screen Negative Screen Negative    Barbiturates Screen Negative Screen Negative    Cocaine Metabolite Screen Negative Screen Negative    Oxycodone Screen Negative Screen Negative    Creatinine, Urine 183.6 mg/dL   Urinalysis, OB Screen   Result Value Ref Range    Glucose, UA Negative Negative    Ketones, UA Negative Negative    Protein, UA Negative Negative mg/dL   HM1 (CBC with Diff)   Result Value Ref Range    WBC 6.7 4.0 - 11.0 thou/uL    RBC 4.17 3.80 - 5.40 mill/uL    Hemoglobin 10.3 (L) 12.0 - 16.0 g/dL    Hematocrit 34.2 (L) 35.0 - 47.0 %    MCV 82 80 - 100 fL    MCH 24.7 (L) 27.0 - 34.0 pg    MCHC 30.1 (L) 32.0 - 36.0 g/dL    RDW 16.1 (H) 11.0 - 14.5 %    Platelets 342 140 - 440 thou/uL    MPV 10.9 8.5 - 12.5 fL    Neutrophils % 74 (H) 50 - 70 %    Lymphocytes % 21 20 - 40 %    Monocytes % 4 2 - 10 %    Eosinophils % 0 0 - 6 %    Basophils %  0 0 - 2 %    Immature Granulocyte % 0 <=0 %    Neutrophils Absolute 5.0 2.0 - 7.7 thou/uL    Lymphocytes Absolute 1.4 0.8 - 4.4 thou/uL    Monocytes Absolute 0.3 0.0 - 0.9 thou/uL    Eosinophils Absolute 0.0 0.0 - 0.4 thou/uL    Basophils Absolute 0.0 0.0 - 0.2 thou/uL    Immature Granulocyte Absolute 0.0 <=0.0 thou/uL   Lead, Blood, Venous   Result Value Ref Range    Lead, Blood (Venous) <2.0 <=4.9 ug/dL     Other screening pregnancy lab work is ordered and pending.    Patient scored a 4/30 on [place]  Depression Screen.    Assessment and Plan:  1. Pregnancy Intake Appointment.  Artie Rapp is 25 y.o. and .  Patient's last menstrual period was 2020 (exact date).  Estimated Date of Delivery: 21  She will be seeing Dr. Carvajal for OB care.  Screening pregnancy lab work was drawn.  Prenatal vitamins prescribed.  Problem list and current medications reviewed and updated.  Dating ultrasound ordered.  Prenatal info packet given.  First trimester genetic screening test was discussed with patient.    She is undecided about testing.  She will let us know if she would like this done.      Follow up in 6 weeks.  Please see OB Episode for complete details.    This dictation uses voice recognition software, which may contain typographical errors.

## 2021-06-11 NOTE — ANESTHESIA POSTPROCEDURE EVALUATION
Patient: Artie Coto [place]cu[place]  Procedure(s):  SUCTION D&C  Anesthesia type: MAC    Patient location: Phase II Recovery  Last vitals:   Vitals Value Taken Time   /58 9/22/2020 11:09 AM   Temp 36.3  C (97.3  F) 9/22/2020 10:46 AM   Pulse 81 9/22/2020 11:06 AM   Resp 16 9/22/2020 10:46 AM   SpO2 99 % 9/22/2020 11:06 AM   Vitals shown include unvalidated device data.  Post vital signs: stable  Level of consciousness: awake and responds to simple questions  Post-anesthesia pain: pain controlled  Post-anesthesia nausea and vomiting: no  Pulmonary: unassisted, return to baseline  Cardiovascular: stable and blood pressure at baseline  Hydration: adequate  Anesthetic events: no    QCDR Measures:  ASA# 11 - Shaina-op Cardiac Arrest: ASA11B - Patient did NOT experience unanticipated cardiac arrest  ASA# 12 - Shaina-op Mortality Rate: ASA12B - Patient did NOT die  ASA# 13 - PACU Re-Intubation Rate: ASA13B - Patient did NOT require a new airway mgmt  ASA# 10 - Composite Anes Safety: ASA10A - No serious adverse event    Additional Notes:

## 2021-06-11 NOTE — TELEPHONE ENCOUNTER
FYI  Non-viable pregnancy, hasn't had miscarriage yet,  Needs to see OBGYN to discuss next steps.  Needs to be seen within 1 week please.  Thanks.

## 2021-06-11 NOTE — TELEPHONE ENCOUNTER
----- Message from Meghan Null PA-C sent at 9/4/2020  2:30 PM CDT -----  LMTCB.  ALSO GAVE MORE DETAILS IN MyCHART MESSAGE.  MISCARRIAGE LIKELY SOON.  WE CAN MONITOR CLOSELY ON OWN, OR HAVE HER SEE OBGYN.

## 2021-06-11 NOTE — TELEPHONE ENCOUNTER
I called pt today and spoke with her to confirm she did receive my message regarding Dr.Hallman stanfordt

## 2021-06-13 NOTE — PROGRESS NOTES
Assessment:   1. Routine general medical examination at a health care facility  - Gynecologic Cytology (PAP Smear)  - Chlamydia trachomatis & Neisseria gonorrhoeae, Amplified Detection  - Wet Prep, Vaginal  2. Need for immunization against influenza  - Influenza, Seasonal,Quad Inj, 36+ MOS  3. Screening-pulmonary TB  - QTF-Mycobacterium tuberculosis by QuantiFERON-TB Gold  Plan:      Await pap smear results. Asymptomatic yeast, not treated.   Breast self exam technique reviewed and patient encouraged to perform self-exam monthly.  Discussed healthy lifestyle modifications.   Counseled on safe sexual practices. Discussed options of birth control. She declines. Advised on daily prenatal vitamins.   Screening TB testing collected, will follow.   Encouraged to keep up with routine health maintenance.     Subjective:      Alvino Perkins is a 22 y.o. female who presents for an annual exam. The patient is sexually active. The patient participates in regular exercise: yes. The patient reports that there is not domestic violence in her life.   She feels well. Would like to be screened for STDs. Is single. In a relationship. Uses condoms.   Trying to get into nursing school.   Working as NA now, in school also. Needs TB screening. Had this done on arrival to [place]. It was negative before.     Healthy Habits:   Regular Exercise: Yes  Sunscreen Use: Yes  Healthy Diet: Yes  Dental Visits Regularly: Yes  Seat Belt: Yes  Sexually active: Yes  Self Breast Exam Monthly:No  Hemoccults: N/A  Flex Sig: N/A  Colonoscopy: N/A  Lipid Profile: N/A  Glucose Screen: N/A  Prevention of Osteoporosis: No  Last Dexa: N/A  Guns at Home:  No      Immunization History   Administered Date(s) Administered     DTP 1996, 02/06/1997, 09/22/1997, 11/11/1997     DTaP, historic 10/26/2000     HPV 9 Valent 06/07/2016     HPV Quadrivalent 08/24/2009, 10/18/2010     Hep A, historic 08/24/2009, 10/18/2010     Hep B, Peds or Adolescent 06/29/2016     Hep B,  historic 1996, 1996, 1996, 03/19/1997     HiB, historic 1996, 02/06/1997, 09/22/1997, 11/11/1997     IPV 1996, 1996, 02/06/1997, 09/22/1997, 10/26/2000     Influenza, seasonal,quad inj 36+ mos 11/05/2016, 11/01/2017     MMR 09/22/1997, 10/26/2000, 06/29/2016     Meningococcal MCV4P 08/24/2009, 06/07/2016     Tdap 08/24/2009     Typhoid, historic 04/20/2011     Varicella 11/11/1997, 08/24/2009     Immunization status: due today.    No exam data present    Gynecologic History  Patient's last menstrual period was 10/23/2017 (approximate).  Contraception: condoms  Last Pap: none.      OB History   No data available       No current outpatient prescriptions on file.     No current facility-administered medications for this visit.      History reviewed. No pertinent past medical history.  History reviewed. No pertinent surgical history.  Review of patient's allergies indicates no known allergies.  Family History   Problem Relation Age of Onset     No Medical Problems Mother      No Medical Problems Father      No Medical Problems Sister      No Medical Problems Brother      Social History     Social History     Marital status: Single     Spouse name: N/A     Number of children: N/A     Years of education: N/A     Occupational History     Not on file.     Social History Main Topics     Smoking status: Never Smoker     Smokeless tobacco: Not on file      Comment: No exposure to second hand smoking.     Alcohol use No     Drug use: No     Sexual activity: Yes     Partners: Male     Birth control/ protection: Condom     Other Topics Concern     Not on file     Social History Narrative       Review of Systems  General:  Denies problem  Eyes: Denies problem  Ears/Nose/Throat: Denies problem  Cardiovascular: Denies problem  Respiratory:  Denies problem  Gastrointestinal:  Denies problem, Genitourinary: Denies problem  Musculoskeletal:  Denies problem  Skin: Denies problem  Neurologic: Denies  problem  Psychiatric: Denies problem  Endocrine: Denies problem  Heme/Lymphatic: Denies problem   Allergic/Immunologic: Denies problem        Objective:         Vitals:    11/01/17 0820   BP: 100/60   Pulse: 76   Resp: 20   Temp: 98.5  F (36.9  C)   TempSrc: Oral   Weight: (!) 98 lb (44.5 kg)   Height: 5' (1.524 m)     Body mass index is 19.14 kg/(m^2).    Physical Exam:  General Appearance: Alert, cooperative, no distress, appears stated age  Head: Normocephalic, without obvious abnormality, atraumatic  Eyes: PERRL, conjunctiva/corneas clear, EOM's intact  Ears: Normal TM's and external ear canals, both ears  Nose: Nares normal, septum midline,mucosa normal, no drainage  Throat: Lips, mucosa, and tongue normal; teeth and gums normal  Neck: Supple, symmetrical, trachea midline, no adenopathy;  thyroid: not enlarged, symmetric, no tenderness/mass/nodules; no carotid bruit or JVD  Back: Symmetric, no curvature, ROM normal, no CVA tenderness  Lungs: Clear to auscultation bilaterally, respirations unlabored  Breasts: No breast masses, tenderness, asymmetry, or nipple discharge.  Heart: Regular rate and rhythm, S1 and S2 normal, no murmur, rub, or gallop  Abdomen: Soft, non-tender, bowel sounds active all four quadrants,  no masses, no organomegaly  Pelvic:Normally developed genitalia with no external lesions or eruptions. Vagina and cervix show no lesions, inflammation, discharge or tenderness. No cystocele, No rectocele. Uterus normal.  No adnexal mass or tenderness.  Extremities: Extremities normal, atraumatic, no cyanosis or edema  Skin: Skin color, texture, turgor normal, no rashes or lesions  Lymph nodes: Cervical, supraclavicular, and axillary nodes normal  Neurologic: Normal        Recent Results (from the past 240 hour(s))   Wet Prep, Vaginal   Result Value Ref Range    Yeast Result Yeast Seen (!) No yeast seen    Trichomonas No Trichomonas seen No Trichomonas seen    Clue Cells, Wet Prep No Clue cells seen No  Clue cells seen     Pap: pending  TB: pending

## 2021-06-13 NOTE — PROGRESS NOTES
Chief Complaint:  Chief Complaint   Patient presents with     preg conf     4th preg. 3rd living 1 m/c - pref. davina     HPI:   Artie Rapp is a 25 y.o. female is here for pregnancy intake.  She is .  Patient's last menstrual period was 10/21/2020 (exact date).  Positive home pregnancy test in early December.  Had D&C for missed SAB on 20.    Past medical history: reviewed and updated.  Past Medical History:   Diagnosis Date     Anemia     borderline     Vaginal delivery 2018    Unmedicated.  srom of clear fluid.  Delivered quickly after this.  Left labial laceration repaired.       Vaginal delivery 2019    Delivered on the toilet.  Coupling noted.  arom of clear fluid.  No meds.  Received cytotec and pitocin for bleeding.       Past Surgical History:   Procedure Laterality Date     DILATION AND CURETTAGE OF UTERUS N/A 2020    Procedure: SUCTION D&C;  Surgeon: Stephanie Pham DO;  Location: [facility];  Service: Obstetrics       Current Outpatient Medications:      prenat.vits,eitan,min-iron-folic Tab, Take 1 tablet by mouth daily., Disp: 100 each, Rfl: 3    Social:  Social History     Tobacco Use     Smoking status: Never Smoker     Smokeless tobacco: Never Used     Tobacco comment: no passive exposure   Substance Use Topics     Alcohol use: No     Drug use: No       OBJECTIVE:  No Known Allergies  Vitals:    20 1011   BP: 94/62   Pulse: 76   Resp: 16     Body mass index is 23.64 kg/m .    Vital signs stable as recorded above  General: Patient is alert and oriented x 3, in no apparent distress  Remainder of physical exam deferred to patient's First OB Visit.    Results:  Results for orders placed or performed in visit on 20   Culture, Urine    Specimen: Urine, Clean Catch   Result Value Ref Range    Culture Mixture of urogenital organisms    Pregnancy (Beta-hCG, Qual), Urine   Result Value Ref Range    Pregnancy Test, Urine Positive (!) Negative   ABO/RH Typing (OP  order)   Result Value Ref Range    HML ABO/Rh Typing B POS     HML ABO/Rh Repeat Typing B POS    Hepatitis B Surface antigen (HBsAG)   Result Value Ref Range    Hepatitis B Surface Ag Negative Negative   HIV Antigen/Antibody Screening Cascade   Result Value Ref Range    HIV Antigen / Antibody Negative Negative   HML Antibody Screen   Result Value Ref Range    HML Antibody Screen Negative    RPR   Result Value Ref Range    Treponema Antibody (Syphilis) Negative Negative   Rubella Immune Status (IgG)   Result Value Ref Range    Rubella Antibody, IgG Positive    Drugs of Abuse 1,Urine   Result Value Ref Range    Amphetamines Screen Negative Screen Negative    Benzodiazepines Screen Negative Screen Negative    Opiates Screen Negative Screen Negative    Phencyclidine Screen Negative Screen Negative    THC Screen Negative Screen Negative    Barbiturates Screen Negative Screen Negative    Cocaine Metabolite Screen Negative Screen Negative    Oxycodone Screen Negative Screen Negative    Creatinine, Urine 44.0 mg/dL   Urinalysis, OB Screen   Result Value Ref Range    Glucose, UA Negative Negative    Ketones, UA Trace (!) Negative    Protein, UA Trace (!) Negative mg/dL   HM1 (CBC with Diff)   Result Value Ref Range    WBC 7.6 4.0 - 11.0 thou/uL    RBC 4.14 3.80 - 5.40 mill/uL    Hemoglobin 11.3 (L) 12.0 - 16.0 g/dL    Hematocrit 35.6 35.0 - 47.0 %    MCV 86 80 - 100 fL    MCH 27.3 27.0 - 34.0 pg    MCHC 31.7 (L) 32.0 - 36.0 g/dL    RDW 15.4 (H) 11.0 - 14.5 %    Platelets 328 140 - 440 thou/uL    MPV 10.6 8.5 - 12.5 fL    Neutrophils % 74 (H) 50 - 70 %    Lymphocytes % 20 20 - 40 %    Monocytes % 6 2 - 10 %    Eosinophils % 0 0 - 6 %    Basophils % 0 0 - 2 %    Immature Granulocyte % 1 (H) <=0 %    Neutrophils Absolute 5.6 2.0 - 7.7 thou/uL    Lymphocytes Absolute 1.5 0.8 - 4.4 thou/uL    Monocytes Absolute 0.4 0.0 - 0.9 thou/uL    Eosinophils Absolute 0.0 0.0 - 0.4 thou/uL    Basophils Absolute 0.0 0.0 - 0.2 thou/uL     Immature Granulocyte Absolute 0.0 <=0.0 thou/uL     Other screening pregnancy lab work is ordered and pending.    Patient scored a 6/30 on [place]  Depression Screen.    Assessment and Plan:  1. Pregnancy Intake Appointment.  Artie Rapp is 25 y.o. and .  Patient's last menstrual period was 10/21/2020 (exact date).  Estimated Date of Delivery: 21  She will be seeing Dr. Carvajal for OB care.  Screening pregnancy lab work was drawn.  Prenatal vitamins prescribed.  Problem list and current medications reviewed and updated.  Dating ultrasound ordered.  Prenatal info packet given.  First trimester genetic screening test was discussed with patient.    She declines Nuchal translucency ultrasound.      Follow up in 6 weeks.  Please see OB Episode for complete details.    This dictation uses voice recognition software, which may contain typographical errors.

## 2021-06-13 NOTE — PATIENT INSTRUCTIONS - HE
Pregnancy: 7-8 weeks    Due Date: July 28, 2021    Next visit in 6 weeks  With Dr. Carvajal  For Your First OB Visit

## 2021-06-13 NOTE — TELEPHONE ENCOUNTER
New Appointment Needed  What is the reason for the visit:    Pregnancy Confirmation Appt Needed  When was the first day of your last menstrual cycle?: 10/21  Have you done a home pregnancy test?: Yes: 1.    Provider Preference: PCP only  How soon do you need to be seen?: 5-7 days  Waitlist offered?: Yes  Okay to leave a detailed message:  Yes

## 2021-06-14 NOTE — PROGRESS NOTES
First OB.   Feeling well though some sharp pain in the lower abdomen, groin, intermittent. No dysuria. No bleeding. No abnormal discharge.   FHT normal.   Discussed possible etiology including round ligament pain.   Discussed usual management.   Follow up in four weeks.

## 2021-06-16 PROBLEM — N20.0 NEPHROLITHIASIS: Status: ACTIVE | Noted: 2019-08-05

## 2021-06-16 PROBLEM — Z3A.01 LESS THAN 8 WEEKS GESTATION OF PREGNANCY: Status: ACTIVE | Noted: 2020-12-14

## 2021-06-16 PROBLEM — R31.21 ASYMPTOMATIC MICROSCOPIC HEMATURIA: Status: ACTIVE | Noted: 2019-08-09

## 2021-06-16 NOTE — PROGRESS NOTES
Artie Rapp is a 25 y.o. female who is being evaluated via a billable telephone visit.      What phone number would you like to be contacted at? [phone]  How would you like to obtain your AVS? AVS Preference: MyChart.    Assessment & Plan     Supervision of other normal pregnancy, antepartum  No concerns.   Reviewed monitoring fetal movement.   Follow up in four weeks, one hour at that visit.          Return in about 4 weeks (around 4/23/2021) for Recheck.    Jose J Carvajal MD  [facility]   Artie Rapp is 25 y.o. and presents today for the following health issues: routine ob visit.   Is feeling well. Some fm. No contractions, lof, bleeding.   Reviewed fetal survey.           Objective       Vitals:  No vitals were obtained today due to virtual visit.              Phone call duration: 6 minutes

## 2021-06-16 NOTE — TELEPHONE ENCOUNTER
Left message #1 at [phone]. Postponing task out to a week and will try again. If patient returns call back, please help patient schedule an appointment per message below. Thanks!

## 2021-06-16 NOTE — TELEPHONE ENCOUNTER
----- Message from Freddy Segura CMA sent at 3/26/2021  3:02 PM CDT -----  Please call and help make appointment for patient to come in to be seen.     ----- Message -----  From: Jose J Carvajal MD  Sent: 3/26/2021   2:17 PM CDT  To: Jose J Carvajal Care Team Pool    Please schedule 4 weeks follow up F2F.

## 2021-06-16 NOTE — TELEPHONE ENCOUNTER
Left message #2 at [phone]. Sending letter out and postponing task out to 2 weeks and will try again if an appointment hasn't been made. If patient returns call back, please help patient schedule an appointment per message below. Thanks!

## 2021-06-17 NOTE — PATIENT INSTRUCTIONS - HE
Patient Instructions by Bashir Ha PA-C at 12/8/2019 11:40 AM     Author: Bashir Ha PA-C Service: -- Author Type: Physician Assistant    Filed: 12/8/2019 12:20 PM Encounter Date: 12/8/2019 Status: Signed    : Bashir Ha PA-C (Physician Assistant)       Increased fluids and rest.  Discussed signs and symptoms of ascending urinary tract infection symptoms to include pyelonephritis. Instructed to turn to clinic if there are increased fever chills night sweats fatigue abdominal pain or flank pain  Antibiotic as written. Risks and benefits of medication discussed.  Indication for return to clinic.    As a result of our visit today, here are the action plans for you:    1. Medication(s) to stop: There are no discontinued medications.    2. Medication(s) to start or change:   Medications Ordered   Medications   ? nitrofurantoin, macrocrystal-monohydrate, (MACROBID) 100 MG capsule     Sig: Take 1 capsule (100 mg total) by mouth 2 (two) times a day for 7 days.     Dispense:  14 capsule     Refill:  0       3. Other instructions: Yes      Urinary Tract Infections in Women    Urinary tract infections (UTIs) are most often caused by bacteria (germs). These bacteria enter the urinary tract. The bacteria may come from outside the body. Or they may travel from the skin outside the rectum or vagina into the urethra. Female anatomy makes it easier for bacteria from the bowel to enter a womans urinary tract, which is the most common source of UTI. This means women develop UTIs more often than men. Pain in or around the urinary tract is a common UTI symptom. But the only way to know for sure if you have a UTI for the health care provider to test your urine. The two tests that may be done are the urinalysis and urine culture.  Types of UTIs    Cystitis: A bladder infection (cystitis) is the most common UTI in women. You may have urgent or frequent urination. You may also have pain, burning when you urinate, and bloody  urine.    Urethritis: This is an inflamed urethra, which is the tube that carries urine from the bladder to outside the body. You may have lower stomach or back pain. You may also have urgent or frequent urination.    Pyelonephritis: This is a kidney infection. If not treated, it can be serious and damage your kidneys. In severe cases, you may be hospitalized. You may have a fever and lower back pain.  Medications to treat a UTI  Most UTIs are treated with antibiotics. These kill the bacteria. The length of time you need to take them depends on the type of infection. It may be as short as 3 days. If you have repeated UTIs, a low-dose antibiotic may be needed for several months. Take antibiotics exactly as directed. Dont stop taking them until all of the medication is gone. If you stop taking the antibiotic too soon, the infection may not go away, and you may develop a resistance to the antibiotic. This can make it much harder to treat.  Lifestyle changes to treat and prevent UTIs  The lifestyle changes below will help get rid of your UTI. They may also help prevent future UTIs.    Drink plenty of fluids. This includes water, juice, or other caffeine-free drinks. Fluids help flush bacteria out of your body.    Empty your bladder. Always empty your bladder when you feel the urge to urinate. And always urinate before going to sleep. Urine that stays in your bladder can lead to infection. Try to urinate before and after sex as well.    Practice good personal hygiene. Wipe yourself from front to back after using the toilet. This helps keep bacteria from getting into the urethra.    Use condoms during sex. These help prevent UTIs caused by sexually transmitted bacteria. Also, avoid using spermicides during sex. These can increase the risk of UTIs. Choose other forms of birth control instead. For women who tend to get UTIs after sex, a low-dose of a preventive antibiotic may be used. Be sure to discuss this option with  your health care provider.    Follow up with your health care provider as directed. He or she may test to make sure the infection has cleared. If necessary, additional treatment may be started.  Date Last Reviewed: 9/8/2014 2000-2016 [employer]. [address] [place] [address], [place] [address]. All rights reserved. This information is not intended as a substitute for professional medical care. Always follow your healthcare professional's instructions.

## 2021-06-17 NOTE — PROGRESS NOTES
Chief Complaint:  Chief Complaint   Patient presents with     Pregnancy Confirmation     #1     HPI:   Artie Rapp is a 22 y.o. female is here for pregnancy intake.  She is .  Patient's last menstrual period was 2018 (exact date).  Positive pregnancy test in clinic on 3/29/18.    Past medical history: reviewed and updated.  No past medical history on file.  No past surgical history on file.    Current Outpatient Prescriptions:      cephalexin (KEFLEX) 500 MG capsule, Take 1 capsule (500 mg total) by mouth 2 (two) times a day for 7 days., Disp: 14 capsule, Rfl: 0     prenatal vit-iron fum-folic ac (PRENATAL VITAMIN WITH MINERALS) 28 mg iron- 800 mcg Tab, Take 1 tablet by mouth daily., Disp: 100 tablet, Rfl: 3    Social:  Social History   Substance Use Topics     Smoking status: Never Smoker     Smokeless tobacco: Never Used     Alcohol use No       OBJECTIVE:  No Known Allergies  Vitals:    18 1105   BP: 116/52   Pulse: 72   Resp: 20     Body mass index is 20.78 kg/(m^2).    Vital signs stable as recorded above  General: Patient is alert and oriented x 3, in no apparent distress  Physical Exam deferred to patient's First OB Visit.  Results:  Results for orders placed or performed in visit on 18   Culture, Urine   Result Value Ref Range    Culture >100,000 col/ml Escherichia coli (!)        Susceptibility    Escherichia coli - CAMRYN     Amoxicillin + Clavulanate <=4/2 Sensitive      Ampicillin <=4 Sensitive      Cefazolin <=1 Sensitive      Cefepime <=1 Sensitive      Ceftriaxone <=1 Sensitive      Ciprofloxacin <=0.5 Sensitive      Gentamicin <=2 Sensitive      Levofloxacin <=1 Sensitive      Meropenem <=0.25 Sensitive      Nitrofurantoin <=16 Sensitive      Tetracycline <=2 Sensitive      Tobramycin <=2 Sensitive      Trimethoprim + Sulfamethoxazole <=0.5 Sensitive    Urinalysis, OB Screen   Result Value Ref Range    Glucose, UA Negative Negative    Ketones, UA Negative Negative    Protein, UA  Negative Negative mg/dL   Hepatitis B Surface antigen (HBsAG)   Result Value Ref Range    Hepatitis B Surface Ag Negative Negative   HIV Antigen/Antibody Screening Cascade   Result Value Ref Range    HIV Antigen / Antibody Negative Negative   HML Antibody Screen   Result Value Ref Range    HML Antibody Screen Negative Negative   RPR   Result Value Ref Range    Treponema Antibody (Syphilis) Negative Negative   Lead, Blood   Result Value Ref Range    Lead  <5.0 ug/dL    Collection Method Venous     Lead Retest No    Drugs of Abuse 1,Urine   Result Value Ref Range    Amphetamines Screen Negative Screen Negative    Benzodiazepines Screen Negative Screen Negative    Opiates Screen Negative Screen Negative    Phencyclidine Screen Negative Screen Negative    THC Screen Negative Screen Negative    Barbiturates Screen Negative Screen Negative    Cocaine Metabolite Screen Negative Screen Negative    Oxycodone Screen Negative Screen Negative    Creatinine, Urine 223.5 mg/dL   HM1 (CBC with Diff)   Result Value Ref Range    WBC 5.6 4.0 - 11.0 thou/uL    RBC 4.07 3.80 - 5.40 mill/uL    Hemoglobin 12.2 12.0 - 16.0 g/dL    Hematocrit 37.5 35.0 - 47.0 %    MCV 92 80 - 100 fL    MCH 30.0 27.0 - 34.0 pg    MCHC 32.5 32.0 - 36.0 g/dL    RDW 12.7 11.0 - 14.5 %    Platelets 252 140 - 440 thou/uL    MPV 10.7 8.5 - 12.5 fL    Neutrophils % 72 (H) 50 - 70 %    Lymphocytes % 22 20 - 40 %    Monocytes % 6 2 - 10 %    Eosinophils % 0 0 - 6 %    Basophils % 0 0 - 2 %    Neutrophils Absolute 4.0 2.0 - 7.7 thou/uL    Lymphocytes Absolute 1.2 0.8 - 4.4 thou/uL    Monocytes Absolute 0.3 0.0 - 0.9 thou/uL    Eosinophils Absolute 0.0 0.0 - 0.4 thou/uL    Basophils Absolute 0.0 0.0 - 0.2 thou/uL   Lead Venous With Demographics   Result Value Ref Range    Lead, B <1.0 0.0 - 4.9 mcg/dL    Venous/Capillary Venous     Patient Street Address [address] [place] [address]     Patient Zip Code [address]     [facility]      Patient Home Phone [phone]     Patient Race      Patient Ethnicity Non      Patient Occupation NA     Patient Employer NA     Guardian First Name NA     Guardian Last Name NA     Health Care Provider Name Meghan Null     Health Care Provider Street Address      Health Care Provider [facility]     Health Care Provider [facility]     Health Care Provider Zip Code NA     Health Care Provider Phone [phone]     Submitting Laboratory Phone [phone]      Other screening pregnancy lab work is ordered and pending.    Patient scored a 8/30 on [place]  Depression Screen.    Assessment and Plan:  1. Pregnancy Intake Appointment.  Artie Rapp is 22 y.o. and .  Patient's last menstrual period was 2018 (exact date).  Estimated Date of Delivery: 18  She will be seeing Dr. Carvajal for OB care.  Screening pregnancy lab work was drawn.  Prenatal vitamins prescribed.  Problem list and current medications reviewed and updated.  Dating ultrasound ordered.  Prenatal info packet given.  First trimester genetic screening discussed.  Patient is undecided.  She will let us know if she would like to have this done.    Follow up in 5 weeks.  Please see OB Episode for complete details.  Visit was approximately 30 minutes, greater than 50% of time spent in face-to-face counseling and coordination of care.    This dictation uses voice recognition software, which may contain typographical errors.

## 2021-06-17 NOTE — PROGRESS NOTES
Assessment:     1. Routine general medical examination at a health care facility  - Gynecologic Cytology (PAP Smear)  - Wet Prep, Vaginal  - Chlamydia trachomatis & Neisseria gonorrhoeae, Amplified Detection  2. Positive pregnancy test  - Pregnancy, Urine  - prenatal vit-iron fum-folic ac (PRENATAL VITAMIN WITH MINERALS) 28 mg iron- 800 mcg Tab; Take 1 tablet by mouth daily.  Dispense: 100 tablet; Refill: 3  Plan:      All questions answered.  Await pap smear results.  Breast self exam technique reviewed and patient encouraged to perform self-exam monthly.  Discussed healthy lifestyle modifications.    We discussed her positive pregnancy test. Was not on birth control. Discussed usual management. Not on prenatal vitamins, this was prescribed. Advised on good nutrition, good oral hydration, need to follow up for pregnancy consult visit with Meghan.   According to LMP approximately five weeks two days gestation.     Subjective:      Artie Rapp is a 22 y.o. female who presents for an annual exam. The patient is sexually active. The patient participates in regular exercise: no. The patient reports that there is not domestic violence in her life.   Requests annual physical. No prior pap smear. No concern for sexually transmitted infection but agrees to be screened.   She is not on birth control. Declines discussion.   Discussed her LMP. She is not sure if she is pregnant. Would like to be checked. Is not taking prenatal vitamins.     Healthy Habits:   Regular Exercise: No  Sunscreen Use: No  Healthy Diet: Yes  Dental Visits Regularly: Yes  Seat Belt: Yes  Sexually active: Yes  Self Breast Exam Monthly:Yes  Hemoccults: N/A  Flex Sig: N/A  Colonoscopy: N/A  Lipid Profile: N/A  Glucose Screen: N/A  Prevention of Osteoporosis: No  Last Dexa: N/A  Guns at Home:  No      Immunization History   Administered Date(s) Administered     DTaP, historic 1996, 02/06/1997, 09/22/1997, 11/04/1997, 10/26/2000     HPV 9 Valent  06/07/2016     HPV Quadrivalent 08/24/2009, 10/18/2010     Hep A, historic 08/24/2009, 10/18/2010     Hep B, Peds or Adolescent 06/29/2016     Hep B, historic 1996, 1996, 03/19/1997     HiB, historic,unspecified 1996, 02/06/1997, 09/22/1997, 11/04/1997     IPV 1996, 02/06/1997, 09/22/1997, 10/26/2000     MMR 09/22/1997, 10/26/2000, 06/29/2016     Meningococcal MCV4P 08/24/2009, 06/07/2016     Td,adult,historic,unspecified 08/24/2009     Tdap 08/24/2009     Varicella 11/04/1997, 08/24/2009, 06/29/2016     Immunization status: declines influenza.     No exam data present    Gynecologic History  Patient's last menstrual period was 02/19/2018 (exact date).  Contraception: none  Last Pap: none.     OB History   No data available       Current Outpatient Prescriptions   Medication Sig Dispense Refill     prenatal vit-iron fum-folic ac (PRENATAL VITAMIN WITH MINERALS) 28 mg iron- 800 mcg Tab Take 1 tablet by mouth daily. 100 tablet 3     No current facility-administered medications for this visit.      History reviewed. No pertinent past medical history.  History reviewed. No pertinent surgical history.  Review of patient's allergies indicates no known allergies.  Family History   Problem Relation Age of Onset     No Medical Problems Mother      No Medical Problems Father      No Medical Problems Sister      Social History     Social History     Marital status: Single     Spouse name: N/A     Number of children: N/A     Years of education: N/A     Occupational History     Not on file.     Social History Main Topics     Smoking status: Never Smoker     Smokeless tobacco: Never Used     Alcohol use No     Drug use: No     Sexual activity: Yes     Partners: Male     Birth control/ protection: None     Other Topics Concern     Not on file     Social History Narrative       Review of Systems  General:  Denies problem  Eyes: Denies problem  Ears/Nose/Throat: Denies problem  Cardiovascular: Denies  "problem  Respiratory:  Denies problem  Gastrointestinal:  Denies problem, Genitourinary: Denies problem  Musculoskeletal:  Denies problem  Skin: Denies problem  Neurologic: Denies problem  Psychiatric: Denies problem  Endocrine: Denies problem  Heme/Lymphatic: Denies problem   Allergic/Immunologic: Denies problem        Objective:         Vitals:    03/29/18 0936   BP: 110/62   Pulse: 76   Resp: 20   Temp: 97.7  F (36.5  C)   TempSrc: Oral   Weight: 102 lb (46.3 kg)   Height: 4' 10.75\" (1.492 m)     Body mass index is 20.78 kg/(m^2).    Physical Exam:  General Appearance: Alert, cooperative, no distress, appears stated age  Head: Normocephalic, without obvious abnormality, atraumatic  Eyes: PERRL, conjunctiva/corneas clear, EOM's intact  Ears: Normal TM's and external ear canals, both ears  Nose: Nares normal, septum midline,mucosa normal, no drainage  Throat: Lips, mucosa, and tongue normal; teeth and gums normal  Neck: Supple, symmetrical, trachea midline, no adenopathy;  thyroid: not enlarged, symmetric, no tenderness/mass/nodules; no carotid bruit or JVD  Back: Symmetric, no curvature, ROM normal, no CVA tenderness  Lungs: Clear to auscultation bilaterally, respirations unlabored  Breasts: No breast masses, tenderness, asymmetry, or nipple discharge.  Heart: Regular rate and rhythm, S1 and S2 normal, no murmur, rub, or gallop  Abdomen: Soft, non-tender, bowel sounds active all four quadrants,  no masses, no organomegaly  Pelvic:Normally developed genitalia with no external lesions or eruptions. Vagina and cervix show no lesions, inflammation, discharge or tenderness. No cystocele, No rectocele. Uterus normal.  No adnexal mass or tenderness.  Extremities: Extremities normal, atraumatic, no cyanosis or edema  Skin: Skin color, texture, turgor normal, no rashes or lesions  Lymph nodes: Cervical, supraclavicular, and axillary nodes normal  Neurologic: Normal         Recent Results (from the past 240 hour(s)) "   Pregnancy, Urine   Result Value Ref Range    Pregnancy Test, Urine Positive (!) Negative   Gynecologic Cytology (PAP Smear)   Result Value Ref Range    Case Report       Gynecologic Cytology Report                       Case: [id number]                                   Authorizing Provider:  Jose J Carvajal MD           Collected:           03/29/2018 1049              Ordering Location:     [facility]  Received:            03/29/2018 1051                                     Medicine/OB                                                                  First Screen:          KALA Kirby                                                                            (ASCP)                                                                       Specimen:    SUREPATH PAP, SCREENING, Endocervical/cervical                                             Interpretation       Negative for squamous intraepithelial lesion or malignancy    Result Flag Normal Normal    Specimen Adequacy       Satisfactory for evaluation, endocervical/transformation zone component present    HPV Reflex? Yes if Abnormal     HIGH RISK No     LMP/Menopause Date 2/19/18     Abnormal Bleeding No     Pt Status n/a     Birth Control/Hormones None     Previous Normal/Date none     Prev Abn Date/Dx none     Cervical Appearance normal    Wet Prep, Vaginal   Result Value Ref Range    Yeast Result No yeast seen No yeast seen    Trichomonas No Trichomonas seen No Trichomonas seen    Clue Cells, Wet Prep No Clue cells seen No Clue cells seen   Chlamydia trachomatis & Neisseria gonorrhoeae, Amplified Detection   Result Value Ref Range    Chlamydia trachomatis, Amplified Detection Negative Negative    Neisseria gonorrhoeae, Amplified Detection Negative Negative

## 2021-06-17 NOTE — PROGRESS NOTES
First OB   Feeling well.   Yes Nausea and vomiting.  Trying to do pepper mint candy.  It helps.  Declines any other medications right now.  Poor Appetite.  Food aversion  No pelvic pain.  No other concerns       No ctx, lof, bleeding, or discharge.  No HA or vision changes.      Labs/imaging reviewed: LMP equal to 8 week ultrasound.  Labs normal.  Discussed screening for aneuploidy and neural tube defects.  They declined at this time.     Preformed physical exam   Reviewed ANNALISA, past medical history, past surgical history, family history, genetic history, and previous obstetrical history.    Encouraged good nutrition, well balanced diet, regular activity.  Discussed foods to avoid.  And other applicable safety/health risks in pregnancy.    Just had physical exam with Pap and GC/chlamydia with Dr. Carvajal in March.  This was when she was diagnosed with pregnancy.    Reviewed that Dr. Carvajal is out on maternity leave.    Seble Isaac

## 2021-06-18 NOTE — PROGRESS NOTES
No ctx, lof, bleeding, or discharge.  No HA or vision changes.  Good FM : yes    Plan today:   Discussed breast feeding: Patient would like to breast-feed/plans on breast-feeding.  We discussed the support system at the hospital.  We will plan to get her breast pump closer to the time of delivery.  Follow-up in 4 weeks: Discussed 20 week anatomy ultrasound be ordered at next visit.      Seble Isaac

## 2021-06-19 NOTE — LETTER
Letter by Juana Overton MD at      Author: Juana Overton MD Service: -- Author Type: --    Filed:  Encounter Date: 11/29/2019 Status: Signed         November 29, 2019     Patient: Artie Rapp   YOB: 1995   Date of Visit: 11/29/2019       To Whom it May Concern:    Artie Rapp was seen in my clinic on 11/29/2019.  She can return to work 2 weeks after delivery.      If you have any questions or concerns, please don't hesitate to call.    Sincerely,         Electronically signed by Juana Overton MD

## 2021-06-19 NOTE — PROGRESS NOTES
No ctx, lof, bleeding, or discharge.  No HA or vision changes.  Good FM: not yet      Plan today:   Anatomy US scheduled   Follow-up in 4 weeks.  Discussed 1 hour GTT between 24-28 weeks.    Seble Isaac

## 2021-06-19 NOTE — LETTER
Letter by Juana Overton MD at      Author: Juana Overton MD Service: -- Author Type: --    Filed:  Encounter Date: 10/11/2019 Status: Signed         October 11, 2019     Patient: Artie Rapp   YOB: 1995   Date of Visit: 10/11/2019       To Whom it May Concern:    Artie Rapp was seen in my clinic on 10/11/2019.  She has and ANNALISA of 12/5/19.  She should be off for a minimum of 3 weeks postpartum.      If you have any questions or concerns, please don't hesitate to call.    Sincerely,         Electronically signed by Juana Overton MD

## 2021-06-19 NOTE — LETTER
Letter by Jose J Carvajal MD at      Author: Jose J Carvajal MD Service: -- Author Type: --    Filed:  Encounter Date: 11/13/2019 Status: Signed         Artie Rapp  [address] [facility] [facility] [place] [address]             November 18, 2019         Dear Ms. Rapp,    Below are the results from your recent visit:    Resulted Orders   Hemoglobin   Result Value Ref Range    Hemoglobin 8.5 (L) 12.0 - 16.0 g/dL       Your hemoglobin is low. Your provider would like you to take an iron supplement. The supplement is called ferrous sulfate (325 mg tablets). Please go to [facility] to  your supplement and take 1 tablet daily.     Please call with questions or contact us using [employer].    Sincerely,        Electronically signed by Jose J Carvajal MD

## 2021-06-19 NOTE — LETTER
Letter by Parag Walters MD at      Author: Parag Walters MD Service: -- Author Type: --    Filed:  Encounter Date: 7/8/2019 Status: (Other)         Artie Rapp  [address] [place] [facility] [address]             July 8, 2019        Dear Ms. Rapp,    Below are the results from your recent visit:    Resulted Orders   QTF-Mycobacterium tuberculosis by QuantiFERON-TB Gold Plus   Result Value Ref Range    QTF RESULT Negative Negative    QTF INTREPRETATION       No interferon-gamma response to M. tuberculosis antigens was detected.  Infecton with M. tuberculosis is unlikely.  A negative result alone does not exclude infection with M. tuberculosis    QTF NIL 0.05 IU/mL    QTF ANTIGEN TB1-NIL 0.00 IU/mL    QTF ANTIGEN TB2 - NIL -0.01 IU/mL    QTF MITOGEN-NIL 5.61 IU/mL       No infection found for the organism tested.      Please call with questions or contact us using [employer].    Sincerely,        Electronically signed by Parag Walters MD

## 2021-06-19 NOTE — PROGRESS NOTES
No ctx, lof, bleeding, or discharge.  No HA or vision changes.  Good FM : yes!  No Questions or concerns    Labs/imaging reviewed: Anatomy ultrasound normal, its a girl    Plan today:   Reviewed 1 hour Glucola, labs and Tdap to be done at next visit.  Reviewed pain control options during labor.  Discussed getting breast pump and that she should check her insurance for coverage.  Follow-up in 4 weeks.    Seble Isaac

## 2021-06-19 NOTE — LETTER
Letter by Meghan Null PA-C at      Author: Meghan Null PA-C Service: -- Author Type: --    Filed:  Encounter Date: 7/2/2019 Status: (Other)       07/02/19    To Whom It May Concern:    Pa Nnamdi Rapp was seen today at [facility] for Pregnancy Confirmation.    She is 17w5d.    Due Date: Estimated Date of Delivery: 12/5/19     Thank you.    Meghan Null PA-C

## 2021-06-20 NOTE — LETTER
Letter by Meghan Null PA-C at      Author: Meghan Null PA-C Service: -- Author Type: --    Filed:  Encounter Date: 8/25/2020 Status: (Other)         08/25/20    To Whom It May Concern:    Pa Nnamdi Rapp was seen today at [facility] for Pregnancy Confirmation.    She is 7w6d.    Due Date: Estimated Date of Delivery: 4/7/21     Thank you.    Meghan Null PA-C

## 2021-06-20 NOTE — LETTER
Letter by Juana Overton MD at      Author: Juana Overton MD Service: -- Author Type: --    Filed:  Encounter Date: 12/13/2019 Status: Signed         December 13, 2019     Patient: Artie Rapp   YOB: 1995   Date of Visit: 12/13/2019       To Whom It May Concern:    It is my medical opinion that Artie Rapp may return to work on December 16th, 2019 without restriction.  .    If you have any questions or concerns, please don't hesitate to call.    Sincerely,        Electronically signed by Juana Overton MD

## 2021-06-20 NOTE — LETTER
Letter by Zenon Lebron MD at      Author: Zenon Lebron MD Service: -- Author Type: --    Filed:  Encounter Date: 7/21/2020 Status: (Other)         Alvino Perkins  [address] [place] [address]             July 23, 2020        Dear Ms. Perkins,    Below are the results from your recent visit:    Resulted Orders   QTF-Mycobacterium tuberculosis by QuantiFERON-TB Gold Plus   Result Value Ref Range    QTF RESULT Negative Negative    QTF INTREPRETATION       No interferon-gamma response to M. tuberculosis antigens was detected.  Infecton with M. tuberculosis is unlikely.  A negative result alone does not exclude infection with M. tuberculosis    QTF NIL 0.21 IU/mL    QTF ANTIGEN TB1-NIL -0.02 IU/mL    QTF ANTIGEN TB2 - NIL -0.04 IU/mL    QTF MITOGEN-NIL 6.78 IU/mL     The QuantiFERON gold blood test was negative, no evidence of TB.        Please call with questions or contact us using [employer].    Sincerely,        Electronically signed by Zenon Lebron MD

## 2021-06-20 NOTE — PROGRESS NOTES
No ctx, lof, bleeding, or discharge.  No HA or vision changes.  Good FM : yes!  Improved weight gain since last visit.  Discussed would like her to gain 1 pound a week until delivery.  Baby is measuring appropriately.    Plan today:   Repeat urine culture  1 hour GTT, CBC and RPR  Tdap  Follow-up with Dr. Carvajal in 4 weeks.    Handout given    Seble Isaac

## 2021-06-21 ENCOUNTER — PRENATAL OFFICE VISIT - HEALTHEAST (OUTPATIENT)
Dept: FAMILY MEDICINE | Facility: CLINIC | Age: 26
End: 2021-06-21

## 2021-06-21 DIAGNOSIS — D50.9 IRON DEFICIENCY ANEMIA, UNSPECIFIED IRON DEFICIENCY ANEMIA TYPE: ICD-10-CM

## 2021-06-21 DIAGNOSIS — Z34.80 SUPERVISION OF OTHER NORMAL PREGNANCY, ANTEPARTUM: ICD-10-CM

## 2021-06-21 NOTE — LETTER
Letter by Meghan Null PA-C at      Author: Meghan Null PA-C Service: -- Author Type: --    Filed:  Encounter Date: 12/14/2020 Status: (Other)         12/14/20    To Whom It May Concern:    Pa Nnamdi Rapp was seen today at [facility] for Pregnancy Confirmation.    She is 7w5d.    Due Date: Estimated Date of Delivery: 7/28/21     Thank you.    Meghan Null PA-C

## 2021-06-21 NOTE — PROGRESS NOTES
Pa Nnamdi Rapp presented self to [facility] with c/o contractions for couple of hours. She reported having bloody show but no leaking of fluids.  EFM applied.  FHT category: II with singular prolonged deceleration which was resolved with positioning and O2 via face mask.  Contraction pattern observed: irregular, palpate moderate. Cervix checked exam as noted, bloody show noted.     Dr Carvajal notified of above, orders received: observe pt for another hour recheck cervix and call back.     Reshma Hewitt RN

## 2021-06-21 NOTE — LETTER
Letter by Jose J Carvajal MD at      Author: Jose J Carvajal MD Service: -- Author Type: --    Filed:  Encounter Date: 4/5/2021 Status: (Other)         Artie Rapp  [address] [facility] [address]      April 5, 2021      Dear Artie Coto,    As a valued [facility] patient, your healthcare needs are our priority.  Your health care team has determined that you are due for an appointment regarding your Routine Ob care.    To help prevent delays in your care, please call the [facility] at [phone].    We look forward to partnering with you to achieve optimal health and wellbeing.    Sincerely,  Your care team at [facility]

## 2021-06-21 NOTE — PROGRESS NOTES
Spoke with Dr Carvajal and notified her that SVE is 2/90/0.  She is faustino q2-4 min apart and is relatively comfortable at this time.  Normal bloody show noted.  Discharge orders recvd.  Reviewed discharge instructions with pt, including instructions to return for eval if her water breaks, she experiences more vaginal bleeding, or if contx become stronger and or closer together.  Discussed fetal movement.  Discharged home.

## 2021-06-21 NOTE — PROGRESS NOTES
Feeling well and comfortable. No contractions. No lof. No bleeding. +Fm.   Labor precautions.   Discussed management at labor and delivery.   Is not sure about pain medications.   Would consider breast feeding but not sure.

## 2021-06-26 ENCOUNTER — HEALTH MAINTENANCE LETTER (OUTPATIENT)
Age: 26
End: 2021-06-26

## 2021-06-26 NOTE — PROGRESS NOTES
No ctx, lof, bleeding. +FM.   Tdap today.   Follow up in two weeks.   We reviewed covid vaccination, is not ready yet, will think about it.

## 2021-06-28 NOTE — PROGRESS NOTES
Progress Notes by Bashir Ha PA-C at 12/8/2019 11:40 AM     Author: Bashir Ha PA-C Service: -- Author Type: Physician Assistant    Filed: 12/8/2019 12:47 PM Encounter Date: 12/8/2019 Status: Signed    : Bashir Ha PA-C (Physician Assistant)       Subjective:      Patient ID: Alvino Perkins is a 24 y.o. female.    Chief Complaint:    HPI     Alvino Perkins is a 24 y.o. female who presents today complaining of one day history of irritative voiding symptoms to include urinary hesitancy urgency frequency and dysuria.  Patient denies gross hematuria.  Denies fever chills night sweats fatigue or other red flag symptoms to include back or flank pain and no vaginal discharge.  She has had a recent urinary tract infections does feel similar to how her other symptoms have been.      No past medical history on file.    No past surgical history on file.    Family History   Problem Relation Age of Onset   ? No Medical Problems Mother    ? No Medical Problems Father    ? No Medical Problems Sister    ? No Medical Problems Brother        Social History     Tobacco Use   ? Smoking status: Never Smoker   ? Smokeless tobacco: Never Used   ? Tobacco comment: No exposure to second hand smoking.   Substance Use Topics   ? Alcohol use: No   ? Drug use: No       Review of Systems  As above in HPI, otherwise balance of Review of Systems are negative.    Objective:     /71   Pulse 85   Temp 98.2  F (36.8  C)   Resp 14   Wt 100 lb (45.4 kg)   LMP 12/05/2019   SpO2 99%   BMI 19.53 kg/m      Physical Exam  Constitutional:       General: She is not in acute distress.     Appearance: She is well-developed. She is not diaphoretic.   HENT:      Head: Normocephalic and atraumatic.      Mouth/Throat:      Pharynx: No oropharyngeal exudate.   Eyes:      General: No scleral icterus.     Pupils: Pupils are equal, round, and reactive to light.   Neck:      Musculoskeletal: Normal range of motion and neck supple.   Cardiovascular:       Rate and Rhythm: Normal rate and regular rhythm.   Pulmonary:      Effort: Pulmonary effort is normal.      Breath sounds: Normal breath sounds.   Abdominal:      Palpations: Abdomen is soft. There is no mass.      Tenderness: There is no abdominal tenderness. There is no guarding or rebound.      Comments: No CVA tenderness to percussion  There is suprapubic tenderness to palpation and does reproduce the sense to urinate   Skin:     General: Skin is dry.      Findings: No rash.   Neurological:      Mental Status: She is alert and oriented to person, place, and time.       Lab:  Recent Results (from the past 24 hour(s))   Urinalysis-UC if Indicated   Result Value Ref Range    Color, UA Yellow Colorless, Yellow, Straw, Light Yellow    Clarity, UA Clear Clear    Glucose, UA Negative Negative    Bilirubin, UA Negative Negative    Ketones, UA Negative Negative    Specific Gravity, UA >=1.030 1.005 - 1.030    Blood, UA Moderate (!) Negative    pH, UA 6.0 5.0 - 8.0    Protein, UA Negative Negative mg/dL    Urobilinogen, UA 1.0 E.U./dL 0.2 E.U./dL, 1.0 E.U./dL    Nitrite, UA Negative Negative    Leukocytes, UA Trace (!) Negative    Bacteria, UA Few (!) None Seen hpf    RBC, UA 5-10 (!) None Seen, 0-2 hpf    WBC, UA 10-25 (!) None Seen, 0-5 hpf    Squam Epithel, UA 0-5 None Seen, 0-5 lpf    Mucus, UA Few (!) None Seen lpf       Assessment:     Procedures    The primary encounter diagnosis was UTI of . A diagnosis of Dysuria was also pertinent to this visit.    Plan:     1. UTI of   nitrofurantoin, macrocrystal-monohydrate, (MACROBID) 100 MG capsule    DISCONTINUED: nitrofurantoin, macrocrystal-monohydrate, (MACROBID) 100 MG capsule   2. Dysuria  Urinalysis-UC if Indicated    Culture, Urine         Patient Instructions     Increased fluids and rest.  Discussed signs and symptoms of ascending urinary tract infection symptoms to include pyelonephritis. Instructed to turn to clinic if there are increased fever  chills night sweats fatigue abdominal pain or flank pain  Antibiotic as written. Risks and benefits of medication discussed.  Indication for return to clinic.    As a result of our visit today, here are the action plans for you:    1. Medication(s) to stop: There are no discontinued medications.    2. Medication(s) to start or change:   Medications Ordered   Medications   ? nitrofurantoin, macrocrystal-monohydrate, (MACROBID) 100 MG capsule     Sig: Take 1 capsule (100 mg total) by mouth 2 (two) times a day for 7 days.     Dispense:  14 capsule     Refill:  0       3. Other instructions: Yes      Urinary Tract Infections in Women    Urinary tract infections (UTIs) are most often caused by bacteria (germs). These bacteria enter the urinary tract. The bacteria may come from outside the body. Or they may travel from the skin outside the rectum or vagina into the urethra. Female anatomy makes it easier for bacteria from the bowel to enter a womans urinary tract, which is the most common source of UTI. This means women develop UTIs more often than men. Pain in or around the urinary tract is a common UTI symptom. But the only way to know for sure if you have a UTI for the health care provider to test your urine. The two tests that may be done are the urinalysis and urine culture.  Types of UTIs    Cystitis: A bladder infection (cystitis) is the most common UTI in women. You may have urgent or frequent urination. You may also have pain, burning when you urinate, and bloody urine.    Urethritis: This is an inflamed urethra, which is the tube that carries urine from the bladder to outside the body. You may have lower stomach or back pain. You may also have urgent or frequent urination.    Pyelonephritis: This is a kidney infection. If not treated, it can be serious and damage your kidneys. In severe cases, you may be hospitalized. You may have a fever and lower back pain.  Medications to treat a UTI  Most UTIs are treated  with antibiotics. These kill the bacteria. The length of time you need to take them depends on the type of infection. It may be as short as 3 days. If you have repeated UTIs, a low-dose antibiotic may be needed for several months. Take antibiotics exactly as directed. Dont stop taking them until all of the medication is gone. If you stop taking the antibiotic too soon, the infection may not go away, and you may develop a resistance to the antibiotic. This can make it much harder to treat.  Lifestyle changes to treat and prevent UTIs  The lifestyle changes below will help get rid of your UTI. They may also help prevent future UTIs.    Drink plenty of fluids. This includes water, juice, or other caffeine-free drinks. Fluids help flush bacteria out of your body.    Empty your bladder. Always empty your bladder when you feel the urge to urinate. And always urinate before going to sleep. Urine that stays in your bladder can lead to infection. Try to urinate before and after sex as well.    Practice good personal hygiene. Wipe yourself from front to back after using the toilet. This helps keep bacteria from getting into the urethra.    Use condoms during sex. These help prevent UTIs caused by sexually transmitted bacteria. Also, avoid using spermicides during sex. These can increase the risk of UTIs. Choose other forms of birth control instead. For women who tend to get UTIs after sex, a low-dose of a preventive antibiotic may be used. Be sure to discuss this option with your health care provider.    Follow up with your health care provider as directed. He or she may test to make sure the infection has cleared. If necessary, additional treatment may be started.  Date Last Reviewed: 9/8/2014 2000-2016 [employer]. [address] [place] [address], [place] [address]. All rights reserved. This information is not intended as a substitute for professional medical care. Always follow your healthcare professional's  instructions.

## 2021-07-03 NOTE — ADDENDUM NOTE
Addendum Note by Bert Dill MD at 9/5/2018  3:01 PM     Author: Bert Dill MD Service: -- Author Type: Physician    Filed: 9/5/2018  3:01 PM Encounter Date: 8/20/2018 Status: Signed    : Bert Dill MD (Physician)    Addended by: BERT DILL on: 9/5/2018 03:01 PM        Modules accepted: Orders

## 2021-07-03 NOTE — ADDENDUM NOTE
Addendum Note by Seble Isaac DO at 8/29/2018  4:31 PM     Author: Seble Isaac DO Service: -- Author Type: Physician    Filed: 8/29/2018  4:31 PM Encounter Date: 8/29/2018 Status: Signed    : Seble Isaac DO (Physician)    Addended by: SEBLE ISAAC on: 8/29/2018 04:31 PM        Modules accepted: Orders

## 2021-07-06 VITALS
BODY MASS INDEX: 26.09 KG/M2 | TEMPERATURE: 97.8 F | DIASTOLIC BLOOD PRESSURE: 60 MMHG | SYSTOLIC BLOOD PRESSURE: 95 MMHG | WEIGHT: 126 LBS | HEART RATE: 70 BPM

## 2021-07-06 VITALS
RESPIRATION RATE: 14 BRPM | SYSTOLIC BLOOD PRESSURE: 91 MMHG | BODY MASS INDEX: 26.92 KG/M2 | TEMPERATURE: 97.7 F | DIASTOLIC BLOOD PRESSURE: 59 MMHG | WEIGHT: 130 LBS | HEART RATE: 84 BPM

## 2021-07-07 ENCOUNTER — COMMUNICATION - HEALTHEAST (OUTPATIENT)
Dept: FAMILY MEDICINE | Facility: CLINIC | Age: 26
End: 2021-07-07

## 2021-07-07 NOTE — TELEPHONE ENCOUNTER
Telephone Encounter by Will Franco MA at 7/7/2021  2:55 PM     Author: Will Franco MA Service: -- Author Type: Medical Assistant    Filed: 7/7/2021  2:55 PM Encounter Date: 7/7/2021 Status: Addendum    : Will Franco MA (Medical Assistant)    Related Notes: Original Note by Will Frnaco MA (Medical Assistant) filed at 7/7/2021  2:55 PM       Are you coming into see this pt on 7/13? You are working virtually?      ----- Message from Benoit Araujo sent at 7/7/2021  2:23 PM CDT -----      ----- Message -----  From: Cecelia Blackmon MA  Sent: 7/7/2021   1:39 PM CDT  To: [facility] Scheduling Registration Pool      ----- Message -----  From: Jose J Carvajal MD  Sent: 7/7/2021  12:56 PM CDT  To: Jose J Carvajal Care Team Pool    Can we schedule patient to be seen on Tuesday next week (7.13) some time in the morning with me-face to face please.

## 2021-07-07 NOTE — PROGRESS NOTES
Feeling well though a little tired. No ctx, lof, bleeding. +FM. Encouraged to continue iron use, good oral hydration, rest as needed.   Follow up in two weeks for GBS.

## 2021-07-07 NOTE — TELEPHONE ENCOUNTER
Telephone Encounter by Estefany Segura at 7/7/2021  1:56 PM     Author: Estefany Segura Service: -- Author Type: Patient Access    Filed: 7/7/2021  1:56 PM Encounter Date: 7/7/2021 Status: Signed    : Estefany Segura (Patient Access)       ----- Message from Cecelia Blackmon MA sent at 7/7/2021  1:39 PM CDT -----    ----- Message -----  From: Jose J Carvajal MD  Sent: 7/7/2021  12:56 PM CDT  To: Jose J Carvajal Care Team Pool    Can we schedule patient to be seen on Tuesday next week (7.13) some time in the morning with me-face to face please.

## 2021-07-08 ENCOUNTER — DOCUMENTATION ONLY (OUTPATIENT)
Dept: ADMINISTRATIVE | Facility: OTHER | Age: 26
End: 2021-07-08

## 2021-07-08 NOTE — PROGRESS NOTES
This encounter was created as part of manual pregnancy episode data conversion for the single EHR project. The following information (where applicable) was manually abstracted from the [facility] Epic instance on July 8, 2021: pregnancy episode name/date, dating, episode encounter linking, pregravid weight, number of fetuses, and pregnancy overview and plan.     Belinda Christensen RN   Clinical Informatics

## 2021-07-13 ENCOUNTER — VIRTUAL VISIT (OUTPATIENT)
Dept: FAMILY MEDICINE | Facility: CLINIC | Age: 26
End: 2021-07-13
Payer: COMMERCIAL

## 2021-07-13 DIAGNOSIS — Z34.80 SUPERVISION OF OTHER NORMAL PREGNANCY, ANTEPARTUM: Primary | ICD-10-CM

## 2021-07-13 PROBLEM — Z34.93 ENCOUNTER FOR SUPERVISION OF NORMAL PREGNANCY IN THIRD TRIMESTER: Status: ACTIVE | Noted: 2021-07-13

## 2021-07-13 PROCEDURE — 99207 PR PRENATAL VISIT: CPT | Mod: 95 | Performed by: FAMILY MEDICINE

## 2021-07-13 NOTE — PROGRESS NOTES
Artie Coto is a 25 year old who is being evaluated via a billable video visit.      How would you like to obtain your AVS? MyChart  If the video visit is dropped, the invitation should be resent by: Text to cell phone: [phone]  Will anyone else be joining your video visit? No    Video Start Time: 11:09 AM    Assessment & Plan     Supervision of other normal pregnancy, antepartum  Doing well.   Reviewed warning signs and reasons to call or be seen urgently.   Follow up next week in clinic.       Jos eJ Carvajal MD  [facility]    Subjective   Artie Coto is a 25 year old female seen for routine OB visit. Is feeling well. No contractions yet, lof, bleeding. +FM. Still working. No concerns.       Objective       GENERAL: Healthy, alert and no distress  EYES: Eyes grossly normal to inspection.  No discharge or erythema, or obvious scleral/conjunctival abnormalities.  RESP: No audible wheeze, cough, or visible cyanosis.  No visible retractions or increased work of breathing.    SKIN: Visible skin clear. No significant rash, abnormal pigmentation or lesions.  NEURO: Cranial nerves grossly intact.  Mentation and speech appropriate for age.  PSYCH: Mentation appears normal, affect normal/bright, judgement and insight intact, normal speech and appearance well-groomed.      Vitals:  No vitals were obtained today due to virtual visit.    Physical Exam   GENERAL: Healthy, alert and no distress  EYES: Eyes grossly normal to inspection.  No discharge or erythema, or obvious scleral/conjunctival abnormalities.  RESP: No audible wheeze, cough, or visible cyanosis.  No visible retractions or increased work of breathing.    SKIN: Visible skin clear. No significant rash, abnormal pigmentation or lesions.  NEURO: Cranial nerves grossly intact.  Mentation and speech appropriate for age.  PSYCH: Mentation appears normal, affect normal/bright, judgement and insight intact, normal speech and appearance  well-grobrianne.      Video-Visit Details    Type of service:  Video Visit    Video End Time:11:16 AM    Originating Location (pt. Location): Home    Distant Location (provider location):  [facility]     Platform used for Video Visit: Damaris

## 2021-07-14 PROBLEM — Z34.90 PREGNANT: Status: RESOLVED | Noted: 2019-11-30 | Resolved: 2019-12-01

## 2021-07-14 PROBLEM — Z3A.08 8 WEEKS GESTATION OF PREGNANCY: Status: RESOLVED | Noted: 2020-08-25 | Resolved: 2020-12-14

## 2021-07-14 PROBLEM — Z34.90 SUPERVISION OF NORMAL PREGNANCY: Status: RESOLVED | Noted: 2018-04-13 | Resolved: 2020-08-25

## 2021-07-14 PROBLEM — Z34.90 PREGNANT: Status: RESOLVED | Noted: 2018-11-12 | Resolved: 2019-07-02

## 2021-07-16 ENCOUNTER — TELEPHONE (OUTPATIENT)
Dept: FAMILY MEDICINE | Facility: CLINIC | Age: 26
End: 2021-07-16

## 2021-07-16 NOTE — TELEPHONE ENCOUNTER
Reason for call:  Needs to be scheduled for her OB visit next visit early in the morning or late afternoon, Dr Carvajal is booked out and patient needs to be seen.    Phone number to reach patient:  Cell number on file:    Telephone Information:   Mobile [phone]       Best Time:  Anytime.    Can we leave a detailed message on this number?  YES    Travel screening: Negative

## 2021-07-19 ENCOUNTER — LAB REQUISITION (OUTPATIENT)
Dept: LAB | Facility: CLINIC | Age: 26
End: 2021-07-19

## 2021-07-19 LAB
HBV SURFACE AB SERPL IA-ACNC: 15.38 M[IU]/ML
HBV SURFACE AG SERPL QL IA: NONREACTIVE

## 2021-07-19 PROCEDURE — 86706 HEP B SURFACE ANTIBODY: CPT | Performed by: INTERNAL MEDICINE

## 2021-07-19 PROCEDURE — 86481 TB AG RESPONSE T-CELL SUSP: CPT | Performed by: INTERNAL MEDICINE

## 2021-07-19 PROCEDURE — 87340 HEPATITIS B SURFACE AG IA: CPT | Performed by: INTERNAL MEDICINE

## 2021-07-21 ENCOUNTER — PRENATAL OFFICE VISIT (OUTPATIENT)
Dept: FAMILY MEDICINE | Facility: CLINIC | Age: 26
End: 2021-07-21
Payer: COMMERCIAL

## 2021-07-21 VITALS
SYSTOLIC BLOOD PRESSURE: 93 MMHG | TEMPERATURE: 97.9 F | DIASTOLIC BLOOD PRESSURE: 65 MMHG | HEART RATE: 90 BPM | RESPIRATION RATE: 14 BRPM | WEIGHT: 134 LBS | BODY MASS INDEX: 27.75 KG/M2

## 2021-07-21 DIAGNOSIS — Z34.80 SUPERVISION OF OTHER NORMAL PREGNANCY, ANTEPARTUM: Primary | ICD-10-CM

## 2021-07-21 LAB
QUANTIFERON MITOGEN: 8.56 IU/ML
QUANTIFERON NIL TUBE: 0.27 IU/ML
QUANTIFERON TB1 TUBE: 0.33 IU/ML
QUANTIFERON TB2 TUBE: 0.32

## 2021-07-21 PROCEDURE — 99207 PR PRENATAL VISIT: CPT | Performed by: FAMILY MEDICINE

## 2021-07-21 NOTE — PROGRESS NOTES
No contractions, lof, bleeding +FM.   Is sitting at work, walking and bouncy ball at home, anxious to deliver.   Labor precautions. Follow up next week.

## 2021-07-22 LAB
GAMMA INTERFERON BACKGROUND BLD IA-ACNC: 0.27 IU/ML
GAMMA INTERFERON BACKGROUND BLD IA-ACNC: 0.27 IU/ML
M TB IFN-G BLD-IMP: NEGATIVE
M TB IFN-G BLD-IMP: NEGATIVE
M TB IFN-G CD4+ BCKGRND COR BLD-ACNC: 8.29 IU/ML
M TB IFN-G CD4+ BCKGRND COR BLD-ACNC: 8.29 IU/ML
MITOGEN IGNF BCKGRD COR BLD-ACNC: 0.05 IU/ML
MITOGEN IGNF BCKGRD COR BLD-ACNC: 0.05 IU/ML
MITOGEN IGNF BCKGRD COR BLD-ACNC: 0.06 IU/ML
MITOGEN IGNF BCKGRD COR BLD-ACNC: 0.06 IU/ML

## 2021-07-23 ENCOUNTER — HOSPITAL ENCOUNTER (INPATIENT)
Facility: HOSPITAL | Age: 26
LOS: 2 days | Discharge: HOME | End: 2021-07-25
Attending: FAMILY MEDICINE | Admitting: FAMILY MEDICINE
Payer: COMMERCIAL

## 2021-07-23 DIAGNOSIS — Z34.90 ENCOUNTER FOR SUPERVISION OF NORMAL PREGNANCY, ANTEPARTUM, UNSPECIFIED GRAVIDITY: ICD-10-CM

## 2021-07-23 LAB
ABO/RH(D): NORMAL
ANTIBODY SCREEN: NEGATIVE
HOLD SPECIMEN: NORMAL
SARS-COV-2 RNA RESP QL NAA+PROBE: NEGATIVE
SPECIMEN EXPIRATION DATE: NORMAL

## 2021-07-23 PROCEDURE — 250N000009 HC RX 250: Performed by: FAMILY MEDICINE

## 2021-07-23 PROCEDURE — 59400 OBSTETRICAL CARE: CPT | Performed by: FAMILY MEDICINE

## 2021-07-23 PROCEDURE — 999N000157 HC STATISTIC RCP TIME EA 10 MIN

## 2021-07-23 PROCEDURE — 86780 TREPONEMA PALLIDUM: CPT | Performed by: FAMILY MEDICINE

## 2021-07-23 PROCEDURE — 120N000001 HC R&B MED SURG/OB

## 2021-07-23 PROCEDURE — 86900 BLOOD TYPING SEROLOGIC ABO: CPT | Performed by: FAMILY MEDICINE

## 2021-07-23 PROCEDURE — 722N000001 HC LABOR CARE VAGINAL DELIVERY SINGLE

## 2021-07-23 PROCEDURE — 87635 SARS-COV-2 COVID-19 AMP PRB: CPT | Performed by: FAMILY MEDICINE

## 2021-07-23 PROCEDURE — 250N000011 HC RX IP 250 OP 636: Performed by: FAMILY MEDICINE

## 2021-07-23 PROCEDURE — 250N000013 HC RX MED GY IP 250 OP 250 PS 637: Performed by: FAMILY MEDICINE

## 2021-07-23 PROCEDURE — 36415 COLL VENOUS BLD VENIPUNCTURE: CPT | Performed by: FAMILY MEDICINE

## 2021-07-23 PROCEDURE — 10907ZC DRAINAGE OF AMNIOTIC FLUID, THERAPEUTIC FROM PRODUCTS OF CONCEPTION, VIA NATURAL OR ARTIFICIAL OPENING: ICD-10-PCS | Performed by: FAMILY MEDICINE

## 2021-07-23 RX ORDER — MISOPROSTOL 200 UG/1
800 TABLET ORAL
Status: DISCONTINUED | OUTPATIENT
Start: 2021-07-23 | End: 2021-07-23 | Stop reason: HOSPADM

## 2021-07-23 RX ORDER — CARBOPROST TROMETHAMINE 250 UG/ML
250 INJECTION, SOLUTION INTRAMUSCULAR
Status: DISCONTINUED | OUTPATIENT
Start: 2021-07-23 | End: 2021-07-25 | Stop reason: HOSPADM

## 2021-07-23 RX ORDER — ACETAMINOPHEN 325 MG/1
650 TABLET ORAL EVERY 4 HOURS PRN
Status: DISCONTINUED | OUTPATIENT
Start: 2021-07-23 | End: 2021-07-25 | Stop reason: HOSPADM

## 2021-07-23 RX ORDER — ONDANSETRON 2 MG/ML
4 INJECTION INTRAMUSCULAR; INTRAVENOUS EVERY 6 HOURS PRN
Status: DISCONTINUED | OUTPATIENT
Start: 2021-07-23 | End: 2021-07-23 | Stop reason: HOSPADM

## 2021-07-23 RX ORDER — OXYTOCIN 10 [USP'U]/ML
10 INJECTION, SOLUTION INTRAMUSCULAR; INTRAVENOUS
Status: COMPLETED | OUTPATIENT
Start: 2021-07-23 | End: 2021-07-23

## 2021-07-23 RX ORDER — MISOPROSTOL 200 UG/1
400 TABLET ORAL
Status: DISCONTINUED | OUTPATIENT
Start: 2021-07-23 | End: 2021-07-23 | Stop reason: HOSPADM

## 2021-07-23 RX ORDER — NALOXONE HYDROCHLORIDE 0.4 MG/ML
0.2 INJECTION, SOLUTION INTRAMUSCULAR; INTRAVENOUS; SUBCUTANEOUS
Status: DISCONTINUED | OUTPATIENT
Start: 2021-07-23 | End: 2021-07-23 | Stop reason: HOSPADM

## 2021-07-23 RX ORDER — MISOPROSTOL 200 UG/1
400 TABLET ORAL
Status: DISCONTINUED | OUTPATIENT
Start: 2021-07-23 | End: 2021-07-25 | Stop reason: HOSPADM

## 2021-07-23 RX ORDER — METOCLOPRAMIDE HYDROCHLORIDE 5 MG/ML
10 INJECTION INTRAMUSCULAR; INTRAVENOUS EVERY 6 HOURS PRN
Status: DISCONTINUED | OUTPATIENT
Start: 2021-07-23 | End: 2021-07-23 | Stop reason: HOSPADM

## 2021-07-23 RX ORDER — FERROUS GLUCONATE 324(38)MG
324 TABLET ORAL
Status: ON HOLD | COMMUNITY
Start: 2021-06-04 | End: 2021-07-25

## 2021-07-23 RX ORDER — OXYTOCIN 10 [USP'U]/ML
10 INJECTION, SOLUTION INTRAMUSCULAR; INTRAVENOUS
Status: DISCONTINUED | OUTPATIENT
Start: 2021-07-23 | End: 2021-07-25 | Stop reason: HOSPADM

## 2021-07-23 RX ORDER — METOCLOPRAMIDE 10 MG/1
10 TABLET ORAL EVERY 6 HOURS PRN
Status: DISCONTINUED | OUTPATIENT
Start: 2021-07-23 | End: 2021-07-23 | Stop reason: HOSPADM

## 2021-07-23 RX ORDER — METHYLERGONOVINE MALEATE 0.2 MG/ML
200 INJECTION INTRAVENOUS
Status: DISCONTINUED | OUTPATIENT
Start: 2021-07-23 | End: 2021-07-23 | Stop reason: HOSPADM

## 2021-07-23 RX ORDER — IBUPROFEN 600 MG/1
600 TABLET, FILM COATED ORAL
Status: DISCONTINUED | OUTPATIENT
Start: 2021-07-23 | End: 2021-07-25 | Stop reason: HOSPADM

## 2021-07-23 RX ORDER — HYDROCORTISONE 2.5 %
CREAM (GRAM) TOPICAL 3 TIMES DAILY PRN
Status: DISCONTINUED | OUTPATIENT
Start: 2021-07-23 | End: 2021-07-25 | Stop reason: HOSPADM

## 2021-07-23 RX ORDER — KETOROLAC TROMETHAMINE 30 MG/ML
30 INJECTION, SOLUTION INTRAMUSCULAR; INTRAVENOUS
Status: DISCONTINUED | OUTPATIENT
Start: 2021-07-23 | End: 2021-07-25 | Stop reason: HOSPADM

## 2021-07-23 RX ORDER — MISOPROSTOL 200 UG/1
800 TABLET ORAL
Status: DISCONTINUED | OUTPATIENT
Start: 2021-07-23 | End: 2021-07-25 | Stop reason: HOSPADM

## 2021-07-23 RX ORDER — NALOXONE HYDROCHLORIDE 0.4 MG/ML
0.4 INJECTION, SOLUTION INTRAMUSCULAR; INTRAVENOUS; SUBCUTANEOUS
Status: DISCONTINUED | OUTPATIENT
Start: 2021-07-23 | End: 2021-07-23 | Stop reason: HOSPADM

## 2021-07-23 RX ORDER — OXYTOCIN/0.9 % SODIUM CHLORIDE 30/500 ML
340 PLASTIC BAG, INJECTION (ML) INTRAVENOUS CONTINUOUS PRN
Status: COMPLETED | OUTPATIENT
Start: 2021-07-23 | End: 2021-07-23

## 2021-07-23 RX ORDER — IBUPROFEN 800 MG/1
800 TABLET, FILM COATED ORAL EVERY 6 HOURS PRN
Status: DISCONTINUED | OUTPATIENT
Start: 2021-07-23 | End: 2021-07-25 | Stop reason: HOSPADM

## 2021-07-23 RX ORDER — LIDOCAINE HYDROCHLORIDE 10 MG/ML
INJECTION, SOLUTION EPIDURAL; INFILTRATION; INTRACAUDAL; PERINEURAL
Status: DISPENSED
Start: 2021-07-23 | End: 2021-07-23

## 2021-07-23 RX ORDER — DOCUSATE SODIUM 100 MG/1
100 CAPSULE, LIQUID FILLED ORAL DAILY
Status: DISCONTINUED | OUTPATIENT
Start: 2021-07-23 | End: 2021-07-25 | Stop reason: HOSPADM

## 2021-07-23 RX ORDER — BISACODYL 10 MG
10 SUPPOSITORY, RECTAL RECTAL DAILY PRN
Status: DISCONTINUED | OUTPATIENT
Start: 2021-07-23 | End: 2021-07-25 | Stop reason: HOSPADM

## 2021-07-23 RX ORDER — OXYTOCIN/0.9 % SODIUM CHLORIDE 30/500 ML
340 PLASTIC BAG, INJECTION (ML) INTRAVENOUS CONTINUOUS PRN
Status: DISCONTINUED | OUTPATIENT
Start: 2021-07-23 | End: 2021-07-23 | Stop reason: HOSPADM

## 2021-07-23 RX ORDER — ONDANSETRON 4 MG/1
4 TABLET, ORALLY DISINTEGRATING ORAL EVERY 6 HOURS PRN
Status: DISCONTINUED | OUTPATIENT
Start: 2021-07-23 | End: 2021-07-23 | Stop reason: HOSPADM

## 2021-07-23 RX ORDER — OXYTOCIN/0.9 % SODIUM CHLORIDE 30/500 ML
100-340 PLASTIC BAG, INJECTION (ML) INTRAVENOUS CONTINUOUS PRN
Status: DISCONTINUED | OUTPATIENT
Start: 2021-07-23 | End: 2021-07-25 | Stop reason: HOSPADM

## 2021-07-23 RX ORDER — METHYLERGONOVINE MALEATE 0.2 MG/ML
200 INJECTION INTRAVENOUS
Status: DISCONTINUED | OUTPATIENT
Start: 2021-07-23 | End: 2021-07-25 | Stop reason: HOSPADM

## 2021-07-23 RX ORDER — PROCHLORPERAZINE 25 MG
25 SUPPOSITORY, RECTAL RECTAL EVERY 12 HOURS PRN
Status: DISCONTINUED | OUTPATIENT
Start: 2021-07-23 | End: 2021-07-23 | Stop reason: HOSPADM

## 2021-07-23 RX ORDER — CARBOPROST TROMETHAMINE 250 UG/ML
250 INJECTION, SOLUTION INTRAMUSCULAR
Status: DISCONTINUED | OUTPATIENT
Start: 2021-07-23 | End: 2021-07-23 | Stop reason: HOSPADM

## 2021-07-23 RX ORDER — PROCHLORPERAZINE MALEATE 10 MG
10 TABLET ORAL EVERY 6 HOURS PRN
Status: DISCONTINUED | OUTPATIENT
Start: 2021-07-23 | End: 2021-07-23 | Stop reason: HOSPADM

## 2021-07-23 RX ORDER — MODIFIED LANOLIN
OINTMENT (GRAM) TOPICAL
Status: DISCONTINUED | OUTPATIENT
Start: 2021-07-23 | End: 2021-07-25 | Stop reason: HOSPADM

## 2021-07-23 RX ADMIN — IBUPROFEN 600 MG: 600 TABLET, FILM COATED ORAL at 13:04

## 2021-07-23 RX ADMIN — BENZOCAINE AND LEVOMENTHOL 1 APPLICATOR: 200; 5 SPRAY TOPICAL at 13:17

## 2021-07-23 RX ADMIN — DOCUSATE SODIUM 100 MG: 100 CAPSULE, LIQUID FILLED ORAL at 13:18

## 2021-07-23 RX ADMIN — WITCH HAZEL 1 EACH: 500 SOLUTION RECTAL; TOPICAL at 13:17

## 2021-07-23 RX ADMIN — Medication 340 ML/HR: at 13:02

## 2021-07-23 RX ADMIN — OXYTOCIN 10 UNITS: 10 INJECTION, SOLUTION INTRAMUSCULAR; INTRAVENOUS at 11:55

## 2021-07-23 RX ADMIN — IBUPROFEN 800 MG: 800 TABLET, FILM COATED ORAL at 19:12

## 2021-07-23 ASSESSMENT — ACTIVITIES OF DAILY LIVING (ADL)
FALL_HISTORY_WITHIN_LAST_SIX_MONTHS: NO
PATIENT_/_FAMILY_COMMUNICATION_STYLE: SPOKEN LANGUAGE (ENGLISH OR BILINGUAL)
HEARING_DIFFICULTY_OR_DEAF: NO
CONCENTRATING,_REMEMBERING_OR_MAKING_DECISIONS_DIFFICULTY: NO
WEAR_GLASSES_OR_BLIND: NO
DOING_ERRANDS_INDEPENDENTLY_DIFFICULTY: NO
DRESSING/BATHING_DIFFICULTY: NO
WALKING_OR_CLIMBING_STAIRS_DIFFICULTY: NO
TOILETING_ISSUES: NO
DIFFICULTY_COMMUNICATING: NO
DIFFICULTY_EATING/SWALLOWING: NO

## 2021-07-23 ASSESSMENT — MIFFLIN-ST. JEOR: SCORE: 1252.11

## 2021-07-23 NOTE — L&D DELIVERY NOTE
Delivery Summary  [facility]  Date of Service: 2021    Name      Artie Rapp         1995  MRN       [id number]  PCP        Jose J De Leon at [facility], [phone].    DELIVERY NARRATIVE    On 2021 Artie Rapp delivered a viable female infant at 39w2d with apgars of 6 and  via Vaginal, Spontaneous Delivery.    Pa presented to [facility]aternity Care on 2021 with contractions that started to worsen this morning. Her group B Strep (GBS) carrier status was negative. She received nothing for induction/augmentation.    Delivery was via vaginal, spontaneous  to a sterile field under none  anesthesia. Infant delivered in   left  occiput  anterior  position. Shoulders delivered without difficulty. The baby was placed on the patient's abdomen.  Cord complications: none  The cord was doubly clamped and cut 3 vessels  were noted immediately due to meconium fluid and infant needing resuscitation.     Placenta delivered at 2021 12:56 PM . Placental disposition was Hospital disposal . Fundal massage performed and fundus found to be  firm. The following uterotonics were given: Pitocin (IM) and Pitocin (IV). Perineum, vagina, cervix were inspected, and the following lacerations were noted: None.QBL: 200 mL.    Excellent hemostasis was noted. Needle and sponge count correct. Infant and patient in delivery room in good and stable condition.   _________________    GA: 39w2d  GP:   Labor Complications:  none  Additional Complications:  none  QBL: 200 mL  Delivery Type: Vaginal, Spontaneous   Duration of Ruptured Membranes: 1h 15m   Weight: 3.09 kg (6 lb 13 oz)   Apgar scores: 6 ,          Dionte, Female-Pa [[id number]]    Labor Event Times    Labor onset date: 21 Onset time:  5:00 AM   Dilation complete date: 21 Complete time: 11:50 AM   Start pushing date/time: 2021 1150      Labor Events     labor?:  "No   steroids: None  Labor Type: Spontaneous  Predominate monitoring during 1st stage: continuous electronic fetal monitoring     Antibiotics received during labor?: No     Rupture identifier: Sac 1  Rupture date/time: 21 1036   Rupture type: Artificial Rupture of Membranes  Fluid color: Meconium  Fluid odor: Normal     Augmentation: None  1:1 continuous labor support provided by?: RN Labor partogram used?: no      Delivery/Placenta Date and Time    Delivery Date: 21 Delivery Time: 11:51 AM   Placenta Date/Time: 2021 12:56 PM  Oxytocin given at the time of delivery: after delivery of baby  Delivering clinician: Jose J Carvajal MD          Vaginal Counts     Initial count performed by 2 team members:  Two Team Members   ana Hickey Dr.       [place] Suture Needles Sponges (RETIRED) Instruments   Initial counts   5    Added to count       Relief counts       Final counts   5          Placed during labor Accounted for at the end of labor   FSE NA NA   IUPC NA NA   Cervadil NA               Final count performed by 2 team members:  Two Team Members   ANA Hickey Dr.      Final count correct?: Yes     Apgars    Living status: Living   1 Minute 5 Minute 10 Minute 15 Minute 20 Minute   Skin color: 0        Heart rate: 1        Reflex irritability: 2        Muscle tone: 2        Respiratory effort: 1        Total: 6        Apgars assigned by: AJKE SMITH RN     Cord    Vessels: 3 Vessels    Cord Complications: None               Cord Blood Disposition: Discard    Gases Sent?: No    Delayed cord clamping?: Yes    Cord Clamping Delay (seconds): 31-60 seconds    Stem cell collection?: Yes, No        Resuscitation    Output in Delivery Room: Stool     [place] Measurements    Weight: 6 lb 13 oz Length: 1' 7.5\"   Head circumference: 33 cm    Output in delivery room: Stool     Skin to Skin and Feeding Plan    Skin to skin initiation date/time: 1841    Skin to skin with: " Mother  Skin to skin end date/time:     Breastfeeding initiated date/time: 7/23/2021 1211     Labor Events and Shoulder Dystocia    Fetal Tracing Prior to Delivery: Category 1  Shoulder dystocia present?: Neg     Delivery (Maternal) (Provider to Complete) ([id number])    Episiotomy: None  Perineal lacerations: None    Est. blood loss (mL): 200  Repair suture: None  Genital tract inspection done: Pos     Blood Loss  Mother: Artie Rapp #[id number]   Start of Mother's Information    Delivery Blood Loss  07/23/21 0500 - 07/23/21 1237    EBL (mL) Hospital Encounter 200 mL    Delivery QBL (mL) Hospital Encounter 200 mL    Total  400 mL         End of Mother's Information  Mother: Artie Rapp Nnamdi #[id number]          Delivery - Provider to Complete ([id number])    Delivering clinician: Jose J Carvajal MD  Delivery Type (Choose the 1 that will go to the Birth History): Vaginal, Spontaneous                                 Placenta    Date/Time: 7/23/2021 12:56 PM  Removal: Spontaneous  Disposition: Hospital disposal           Anesthesia    Method: None                Presentation and Position    Position: Left Occiput Anterior                 Completed by:   Jose J Carvajal MD  [facility]  7/23/2021 12:33 PM   used: Not needed.

## 2021-07-23 NOTE — H&P
"  2021    Artie Rapp  [id number]            OB Admit History & Physical      25 year old  at 39 weeks, 2 days by 7 week, 6 day ultrasound here with contractions that started last night, worsening since this morning. .    She has noticed no loss of fluid, bleeding. Baby is moving well.     Patient's last menstrual period was 10/21/2020.   Her Estimated Date of Delivery: 2021  , making her 39w2d  wks.      Estimated body mass index is 28.22 kg/m  as calculated from the following:    Height as of this encounter: 1.473 m (4' 10\").    Weight as of this encounter: 61.2 kg (135 lb).  Her prenatal course has been uncomplicated.    See prenatal for labs.  negative GBBS, Rubella positive Immune, RH B.    Her OB history:   OB History    Para Term  AB Living   4 2 2 0 1 2   SAB TAB Ectopic Multiple Live Births   1 0 0 0 2      # Outcome Date GA Lbr Markos/2nd Weight Sex Delivery Anes PTL Lv   4 Current            3 SAB 20 11w6d             Birth Comments: missed SAB, needed D&C   2 Term 19 39w2d  2.801 kg (6 lb 2.8 oz) F Vag-Spont None N CHRIS      Name: Lance      Apgar1: 8  Apgar5: 9   1 Term 18 38w0d 02:25 / 00:05 2.28 kg (5 lb 0.4 oz) F Vag-Spont Local N CHRIS      Name: Kit      Apgar1: 8  Apgar5: 9            Past Medical History:   Diagnosis Date     Anemia     borderline     Vaginal delivery 2019    Delivered on the toilet.  Coupling noted.  arom of clear fluid.  No meds.  Received cytotec and pitocin for bleeding.       Vaginal delivery 2018    Unmedicated.  srom of clear fluid.  Delivered quickly after this.  Left labial laceration repaired.            Past Surgical History:   Procedure Laterality Date     DILATION AND CURETTAGE N/A 2020    Procedure: SUCTION D&C;  Surgeon: Stephanie Pham DO;  Location: [facility];  Service: Obstetrics         No current outpatient medications on file.       Allergies: Patient has no known " allergies.      REVIEW OF SYSTEMS:  NEUROLOGIC:  Negative  EYES:  Negative  ENT:  Negative  GI:  Negative  BREAST:  Negative  :  Negative  GYN:  Negative  CV:  Negative  PULMONARY:  Negative  MUSCULOSKELETAL:  Negative  PSYCH:  Negative        Social History     Socioeconomic History     Marital status: Single     Spouse name: Not on file     Number of children: Not on file     Years of education: Not on file     Highest education level: Not on file   Occupational History     Not on file   Tobacco Use     Smoking status: Never Smoker     Smokeless tobacco: Never Used     Tobacco comment: no passive exposure   Substance and Sexual Activity     Alcohol use: No     Drug use: No     Sexual activity: Yes     Partners: Male     Birth control/protection: None   Other Topics Concern     Not on file   Social History Narrative     Not on file     Social Determinants of Health     Financial Resource Strain:      Difficulty of Paying Living Expenses:    Food Insecurity:      Worried About Running Out of Food in the Last Year:      Ran Out of Food in the Last Year:    Transportation Needs:      Lack of Transportation (Medical):      Lack of Transportation (Non-Medical):    Physical Activity:      Days of Exercise per Week:      Minutes of Exercise per Session:    Stress:      Feeling of Stress :    Social Connections:      Frequency of Communication with Friends and Family:      Frequency of Social Gatherings with Friends and Family:      Attends [employer] Services:      Active Member of Clubs or Organizations:      Attends Club or Organization Meetings:      Marital Status:    Intimate Partner Violence:      Fear of Current or Ex-Partner:      Emotionally Abused:      Physically Abused:      Sexually Abused:       Family History   Problem Relation Age of Onset     No Known Problems Mother      No Known Problems Father      No Known Problems Sister      No Known Problems Brother      No Known Problems Sister      No Known  Problems Sister      No Known Problems Brother      No Known Problems Brother              Vitals:   FHT 130s  With contractions every  2-3 min    Alert Awake in NAD  HEENT grossly normal  Neck: no lymphadenopathy or thryoidomegaly  Lungs clear  Heart regular  ABD gravid  Cervix is 5 cm   EXT:  no edema or calf tenderness  Neuro:  normal    Assessment:  IUP at 39w2d  In active labor    Plan:  Continue expectant management  Pain control as desired  Covid testing due to pandemic.   Bottle feeding             Jose J Carvajal MD MD  Dept of [facility]  July 23, 2021

## 2021-07-23 NOTE — PROGRESS NOTES
Attended delivery, baby low RR and SaO2% 53% on RA, baby given CPAP+5, titrated FiO2 from 50% to 21% x 5 minutes.  RA SaO2 94% RA no CPAP, baby with mom.

## 2021-07-23 NOTE — PLAN OF CARE
Problem: Adult Inpatient Plan of Care  Goal: Absence of Hospital-Acquired Illness or Injury  Outcome: Improving  Intervention: Prevent Infection  Recent Flowsheet Documentation  Taken 7/23/2021 1015 by Snehal Quinn, RN  Infection Prevention: hand hygiene promoted     Problem: Adult Inpatient Plan of Care  Goal: Readiness for Transition of Care  Outcome: Improving  Flowsheets (Taken 7/23/2021 1006)  Anticipated Changes Related to Illness: none  Concerns to be Addressed: denies needs/concerns at this time  Intervention: Mutually Develop Transition Plan  Recent Flowsheet Documentation  Taken 7/23/2021 1006 by Snehal Quinn, RN  Anticipated Changes Related to Illness: none  Concerns to be Addressed: denies needs/concerns at this time  Taken 7/23/2021 1004 by Snehal Quinn, RN  Equipment Currently Used at Home: none

## 2021-07-24 PROBLEM — D62 ANEMIA DUE TO BLOOD LOSS, ACUTE: Status: ACTIVE | Noted: 2021-07-24

## 2021-07-24 LAB
HGB BLD-MCNC: 7.2 G/DL (ref 11.7–15.7)
T PALLIDUM AB SER QL: NEGATIVE

## 2021-07-24 PROCEDURE — 258N000003 HC RX IP 258 OP 636: Performed by: FAMILY MEDICINE

## 2021-07-24 PROCEDURE — 250N000013 HC RX MED GY IP 250 OP 250 PS 637: Performed by: FAMILY MEDICINE

## 2021-07-24 PROCEDURE — 99232 SBSQ HOSP IP/OBS MODERATE 35: CPT | Mod: GC | Performed by: FAMILY MEDICINE

## 2021-07-24 PROCEDURE — 85018 HEMOGLOBIN: CPT | Performed by: FAMILY MEDICINE

## 2021-07-24 PROCEDURE — 120N000001 HC R&B MED SURG/OB

## 2021-07-24 PROCEDURE — 36415 COLL VENOUS BLD VENIPUNCTURE: CPT | Performed by: FAMILY MEDICINE

## 2021-07-24 PROCEDURE — 250N000011 HC RX IP 250 OP 636: Performed by: FAMILY MEDICINE

## 2021-07-24 RX ORDER — EPINEPHRINE 1 MG/ML
0.3 INJECTION, SOLUTION INTRAMUSCULAR; SUBCUTANEOUS
Status: DISCONTINUED | OUTPATIENT
Start: 2021-07-24 | End: 2021-07-25 | Stop reason: HOSPADM

## 2021-07-24 RX ORDER — METHYLPREDNISOLONE SODIUM SUCCINATE 125 MG/2ML
125 INJECTION, POWDER, LYOPHILIZED, FOR SOLUTION INTRAMUSCULAR; INTRAVENOUS
Status: DISCONTINUED | OUTPATIENT
Start: 2021-07-24 | End: 2021-07-25 | Stop reason: HOSPADM

## 2021-07-24 RX ORDER — DIPHENHYDRAMINE HYDROCHLORIDE 50 MG/ML
50 INJECTION INTRAMUSCULAR; INTRAVENOUS
Status: DISCONTINUED | OUTPATIENT
Start: 2021-07-24 | End: 2021-07-25 | Stop reason: HOSPADM

## 2021-07-24 RX ADMIN — IRON SUCROSE 300 MG: 20 INJECTION, SOLUTION INTRAVENOUS at 14:26

## 2021-07-24 RX ADMIN — DOCUSATE SODIUM 100 MG: 100 CAPSULE, LIQUID FILLED ORAL at 09:35

## 2021-07-24 NOTE — PLAN OF CARE
Problem: Bleeding (Postpartum Vaginal Delivery)  Goal: Hemostasis  Outcome: Improving     Problem: Pain (Postpartum Vaginal Delivery)  Goal: Acceptable Pain Control  Outcome: Improving     Vitals stable. Assessments within normal limits. Denied pain. Pt took Ibuprofen for pain prevention. Ambulates independently. Voiding.  Bottle feeding baby with donor milk and formula.  BP low, Asymptomatic.  Encouraged to drink more fluids.

## 2021-07-24 NOTE — PLAN OF CARE
Problem: Adjustment to Role Transition (Postpartum Vaginal Delivery)  Goal: Successful Maternal Role Transition  Outcome: Adequate for Discharge     Problem: Bleeding (Postpartum Vaginal Delivery)  Goal: Hemostasis  Outcome: Adequate for Discharge     Problem: Pain (Postpartum Vaginal Delivery)  Goal: Acceptable Pain Control  Outcome: Adequate for Discharge     Problem: Urinary Retention (Postpartum Vaginal Delivery)  Goal: Effective Urinary Elimination  Outcome: Adequate for Discharge     Patients VSS, BP has been stable then low.  Patient denies any symptoms of feeling light headed or tired.  Patient Iron infusion just finished.  Plan is to stay the night and have a Hgb drawn in am.

## 2021-07-24 NOTE — PLAN OF CARE
Problem: Adult Inpatient Plan of Care  Goal: Readiness for Transition of Care  Outcome: Improving     Problem: Adult Inpatient Plan of Care  Goal: Optimal Comfort and Wellbeing  Outcome: Improving   Pt denies pain and is independent in room. Infant is formula feeding. Read for 24hr discharge today.

## 2021-07-24 NOTE — PROGRESS NOTES
Maternal Postpartum Progress Note  [facility]aternity [facility]  Date of Service: 2021    Name      Artie Rapp         1995  MRN       [id number]  PCP        Jose J Carvajal    ________________________________________________________________________    Subjective:  Patient had a QBL > 1000 mL.  She denies any chest pain, palpitations or presyncope.  Bleeding started to slow this am.  Discussed Hgb of 7.2 today.  Patient denies headache, dizziness, dyspnea, and leg pain.  Ambulating and eating.    Objective:  Patient Vitals for the past 24 hrs:   BP Temp Temp src Pulse Resp SpO2   21 1000 95/56 98.6  F (37  C) Oral 84 16 --   21 0030 95/45 -- -- -- -- --   21 2250 (!) 79/41 98.1  F (36.7  C) Oral 75 18 --   21 1839 96/58 98.1  F (36.7  C) Oral 90 18 98 %   21 1406 90/55 -- -- -- 16 --   21 1339 91/52 -- -- -- 16 --   21 1324 91/54 -- -- -- 16 --   21 1309 96/57 -- -- -- 16 --   21 1254 99/56 -- -- -- 16 --   21 1245 -- -- -- -- 16 99 %   21 1240 -- -- -- -- -- 99 %   21 1239 98/68 -- -- -- 16 --     General: Alert, comfortable.  Heart: RRR, no murmur.  Lungs: CTA bilaterally.  Abdomen: non-distended. Uterine fundus firm, below umbilicus.  Ext: trace edema, no calf tenderness.    Labs:  Recent Results (from the past 24 hour(s))   Hemoglobin    Collection Time: 21  7:01 AM   Result Value Ref Range    Hemoglobin 7.2 (L) 11.7 - 15.7 g/dL        Assessment/Plan:     1) Acute blood loss anemia: One episode hypotension, but patient asymptomatic.  Will start iron infusion and recheck Hgb in am.  2) Postpartum Day #1 s/p vaginal delivery.  Patient feels well.  Breast and bottle feeding.  - Continue current care.  - Likely home tomorrow.    Completed by:   Cindy Lima MD, M.D.  [facility]  2021 12:23 PM   used: Not needed.

## 2021-07-24 NOTE — PLAN OF CARE
Problem: Pain (Postpartum Vaginal Delivery)  Goal: Acceptable Pain Control  Outcome: Adequate for Discharge     Problem: Urinary Retention (Postpartum Vaginal Delivery)  Goal: Effective Urinary Elimination  Outcome: Adequate for Discharge     Patients VSS, is independent with self cares and infant cares.  Will continue to monitor.

## 2021-07-25 VITALS
HEART RATE: 72 BPM | BODY MASS INDEX: 28.34 KG/M2 | HEIGHT: 58 IN | SYSTOLIC BLOOD PRESSURE: 96 MMHG | WEIGHT: 135 LBS | TEMPERATURE: 97.9 F | OXYGEN SATURATION: 98 % | DIASTOLIC BLOOD PRESSURE: 63 MMHG | RESPIRATION RATE: 16 BRPM

## 2021-07-25 LAB — HGB BLD-MCNC: 7.2 G/DL (ref 11.7–15.7)

## 2021-07-25 PROCEDURE — 85018 HEMOGLOBIN: CPT | Performed by: FAMILY MEDICINE

## 2021-07-25 PROCEDURE — 250N000013 HC RX MED GY IP 250 OP 250 PS 637: Performed by: FAMILY MEDICINE

## 2021-07-25 PROCEDURE — 36415 COLL VENOUS BLD VENIPUNCTURE: CPT | Performed by: FAMILY MEDICINE

## 2021-07-25 RX ORDER — IBUPROFEN 800 MG/1
800 TABLET, FILM COATED ORAL EVERY 8 HOURS PRN
Qty: 30 TABLET | Refills: 0 | Status: SHIPPED | OUTPATIENT
Start: 2021-07-25 | End: 2022-02-15

## 2021-07-25 RX ORDER — DOCUSATE SODIUM 100 MG/1
100 CAPSULE, LIQUID FILLED ORAL 2 TIMES DAILY PRN
Qty: 30 CAPSULE | Refills: 0 | Status: SHIPPED | OUTPATIENT
Start: 2021-07-25 | End: 2022-06-08

## 2021-07-25 RX ADMIN — DOCUSATE SODIUM 100 MG: 100 CAPSULE, LIQUID FILLED ORAL at 09:00

## 2021-07-25 RX ADMIN — IBUPROFEN 800 MG: 800 TABLET, FILM COATED ORAL at 09:00

## 2021-07-25 NOTE — DISCHARGE SUMMARY
Maternal Discharge Summary  [facility]  Date of Service: 2021    Name      Artie Rapp         1995  MRN       [id number]  PCP        Jose J De Leon at [facility], [phone].    ________________________________________________________________________    Assessment:  Artie Rapp is a 25 year old now  s/p vaginal, spontaneous  at 39w2d.  1) Acute blood loss anemia:  Hgb stable at 7.2.  Vitals stable.  Received IV iron gluconate x 1.  Recommend recheck Hgb in 1 week.  Continue PNV and iron supplement.    Discharge Plan:   1. Discharge to Home. Condition at Discharge:  stable  2. Physical activity: Regular.  3. Diet:  Traditional Hmong postpartum diet.  4. Home care nurse: ordered for 1-2 days from now.  5. Lactation clinic appointment: ordered.  6. Contraception plan: undecided, will follow up at postpartum visit.  7. Follow up with Jose J De Leon in 6 weeks.  Future Appointments   Date Time Provider Department Center   2021  3:40 PM Jose J Carvajal MD [facility]        ________________________________________________________________________    Admission Date:  2021  Delivery Date:  @BABYSUPPRESS(DBLINK,ept,110,,1,,)@   Discharge Date:  2021    Estimated Date of Delivery: Data Unavailable  Gestational Age at Delivery: 39w2d    Principal Diagnosis: Labor and delivery  Delivery type: Vaginal, Spontaneous     Principal Problem:    Anemia due to blood loss, acute  Active Problems:    Supervision of normal pregnancy      Subjective:  Patient denies headache, dizziness, dyspnea, and leg pain. Ambulating and eating.  Tolerated iron infusion well.  More energy.  Vitals are improved today.  Hgb is the same at 7.2.  Bleeding has slowed significantly.    Discharge Exam:  Patient Vitals for the past 24 hrs:   BP Temp Temp src Pulse Resp SpO2   21 0900 96/63 97.9  F (36.6  C) Oral 72 16 98 %   21 0011 90/49  98.2  F (36.8  C) Oral 59 18 99 %   07/24/21 1652 97/43 98.2  F (36.8  C) Oral 70 16 --   07/24/21 1515 92/43 -- -- 75 16 --   07/24/21 1500 102/57 -- -- 70 18 --   07/24/21 1445 98/57 -- -- 73 18 --   07/24/21 1440 105/59 -- -- 68 16 --   07/24/21 1425 96/53 98.4  F (36.9  C) Oral 59 14 --     General - alert, comfortable  Heart - RRR, no murmurs  Lungs - CTA bilaterally  Abdomen - fundus firm, nontender, below umbilicus  Extremities - trace edema  Admission on 07/23/2021   Component Date Value Ref Range Status     Treponema Antibody Total 07/23/2021 Negative  Negative Final     ABO/RH(D) 07/23/2021 B POS   Final     Antibody Screen 07/23/2021 Negative  Negative Final     SPECIMEN EXPIRATION DATE 07/23/2021 [id number]   Final     SARS CoV2 PCR 07/23/2021 Negative  Negative Final    NEGATIVE: SARS-CoV-2 (COVID-19) RNA not detected, presumed negative.     Hold Specimen 07/23/2021 [facility]   Final     Hemoglobin 07/24/2021 7.2* 11.7 - 15.7 g/dL Final     Hemoglobin 07/25/2021 7.2* 11.7 - 15.7 g/dL Final      Discharge Medications:   See medication reconciliation.     Completed by:   Cindy Lima MD  [facility]  7/25/2021 10:56 AM   used: Not needed.

## 2021-07-25 NOTE — PLAN OF CARE
Problem: Adult Inpatient Plan of Care  Goal: Readiness for Transition of Care  Outcome: Improving     Bonding well with baby. Attentive ti signs of hunger, bottle feeding with formula. Up to bathroom independently, denies dizziness. Hopeful for discharge later today.

## 2021-07-25 NOTE — DISCHARGE INSTRUCTIONS

## 2021-09-03 ENCOUNTER — IMMUNIZATION (OUTPATIENT)
Dept: NURSING | Facility: CLINIC | Age: 26
End: 2021-09-03
Payer: COMMERCIAL

## 2021-09-03 PROCEDURE — 91300 PR COVID VAC PFIZER DIL RECON 30 MCG/0.3 ML IM: CPT

## 2021-09-03 PROCEDURE — 0001A PR COVID VAC PFIZER DIL RECON 30 MCG/0.3 ML IM: CPT

## 2021-09-17 ENCOUNTER — MEDICAL CORRESPONDENCE (OUTPATIENT)
Dept: HEALTH INFORMATION MANAGEMENT | Facility: CLINIC | Age: 26
End: 2021-09-17

## 2021-09-25 ENCOUNTER — HEALTH MAINTENANCE LETTER (OUTPATIENT)
Age: 26
End: 2021-09-25

## 2021-10-13 ENCOUNTER — IMMUNIZATION (OUTPATIENT)
Dept: NURSING | Facility: CLINIC | Age: 26
End: 2021-10-13
Attending: FAMILY MEDICINE
Payer: COMMERCIAL

## 2021-10-13 PROCEDURE — 0002A PR COVID VAC PFIZER DIL RECON 30 MCG/0.3 ML IM: CPT

## 2021-10-13 PROCEDURE — 91300 PR COVID VAC PFIZER DIL RECON 30 MCG/0.3 ML IM: CPT

## 2021-10-16 ENCOUNTER — HEALTH MAINTENANCE LETTER (OUTPATIENT)
Age: 26
End: 2021-10-16

## 2021-11-26 ENCOUNTER — MEDICAL CORRESPONDENCE (OUTPATIENT)
Dept: HEALTH INFORMATION MANAGEMENT | Facility: CLINIC | Age: 26
End: 2021-11-26
Payer: COMMERCIAL

## 2022-02-02 NOTE — PATIENT INSTRUCTIONS - HE
Pregnancy: about 16-17 weeks    Due Date: early December 2019    We will call you after the ultrasound to talk about when the baby is due and which doctor can see you for your pregnancy.    
Detail Level: Zone
General Sunscreen Counseling: I recommended a broad spectrum sunscreen with a SPF of 30 or higher.  I explained that SPF 30 sunscreens block approximately 97 percent of the sun's harmful rays.  Sunscreens should be applied at least 15 minutes prior to expected sun exposure and then every 2 hours after that as long as sun exposure continues. If swimming or exercising sunscreen should be reapplied every 45 minutes to an hour after getting wet or sweating.  One ounce, or the equivalent of a shot glass full of sunscreen, is adequate to protect the skin not covered by a bathing suit. I also recommended a lip balm with a sunscreen as well. Sun protective clothing can be used in lieu of sunscreen but must be worn the entire time you are exposed to the sun's rays.

## 2022-02-15 ENCOUNTER — OFFICE VISIT (OUTPATIENT)
Dept: FAMILY MEDICINE | Facility: CLINIC | Age: 27
End: 2022-02-15
Payer: COMMERCIAL

## 2022-02-15 VITALS
HEART RATE: 62 BPM | BODY MASS INDEX: 23.41 KG/M2 | DIASTOLIC BLOOD PRESSURE: 67 MMHG | OXYGEN SATURATION: 99 % | SYSTOLIC BLOOD PRESSURE: 101 MMHG | WEIGHT: 112 LBS | TEMPERATURE: 98.1 F

## 2022-02-15 DIAGNOSIS — Z3A.09 9 WEEKS GESTATION OF PREGNANCY: Primary | ICD-10-CM

## 2022-02-15 DIAGNOSIS — N91.2 AMENORRHEA: ICD-10-CM

## 2022-02-15 PROBLEM — Z34.93 ENCOUNTER FOR SUPERVISION OF NORMAL PREGNANCY IN THIRD TRIMESTER: Status: RESOLVED | Noted: 2021-07-13 | Resolved: 2022-02-15

## 2022-02-15 PROBLEM — Z34.90 SUPERVISION OF NORMAL PREGNANCY: Status: RESOLVED | Noted: 2021-07-23 | Resolved: 2022-02-15

## 2022-02-15 LAB
ABO/RH(D): NORMAL
ALBUMIN UR-MCNC: NEGATIVE MG/DL
ANTIBODY SCREEN: NEGATIVE
BASOPHILS # BLD AUTO: 0 10E3/UL (ref 0–0.2)
BASOPHILS NFR BLD AUTO: 0 %
EOSINOPHIL # BLD AUTO: 0 10E3/UL (ref 0–0.7)
EOSINOPHIL NFR BLD AUTO: 1 %
ERYTHROCYTE [DISTWIDTH] IN BLOOD BY AUTOMATED COUNT: 14.3 % (ref 10–15)
GLUCOSE UR STRIP-MCNC: NEGATIVE MG/DL
HBA1C MFR BLD: 5.3 % (ref 0–5.6)
HBV SURFACE AG SERPL QL IA: NONREACTIVE
HCG UR QL: POSITIVE
HCT VFR BLD AUTO: 35.6 % (ref 35–47)
HCV AB SERPL QL IA: NEGATIVE
HGB BLD-MCNC: 11.4 G/DL (ref 11.7–15.7)
HIV 1+2 AB+HIV1 P24 AG SERPL QL IA: NEGATIVE
IMM GRANULOCYTES # BLD: 0 10E3/UL
IMM GRANULOCYTES NFR BLD: 0 %
KETONES UR STRIP-MCNC: NEGATIVE MG/DL
LYMPHOCYTES # BLD AUTO: 1.3 10E3/UL (ref 0.8–5.3)
LYMPHOCYTES NFR BLD AUTO: 24 %
MCH RBC QN AUTO: 28 PG (ref 26.5–33)
MCHC RBC AUTO-ENTMCNC: 32 G/DL (ref 31.5–36.5)
MCV RBC AUTO: 88 FL (ref 78–100)
MONOCYTES # BLD AUTO: 0.3 10E3/UL (ref 0–1.3)
MONOCYTES NFR BLD AUTO: 5 %
NEUTROPHILS # BLD AUTO: 3.9 10E3/UL (ref 1.6–8.3)
NEUTROPHILS NFR BLD AUTO: 70 %
PLATELET # BLD AUTO: 324 10E3/UL (ref 150–450)
RBC # BLD AUTO: 4.07 10E6/UL (ref 3.8–5.2)
SPECIMEN EXPIRATION DATE: NORMAL
WBC # BLD AUTO: 5.5 10E3/UL (ref 4–11)

## 2022-02-15 PROCEDURE — 99000 SPECIMEN HANDLING OFFICE-LAB: CPT | Performed by: PHYSICIAN ASSISTANT

## 2022-02-15 PROCEDURE — 86850 RBC ANTIBODY SCREEN: CPT | Performed by: PHYSICIAN ASSISTANT

## 2022-02-15 PROCEDURE — 86803 HEPATITIS C AB TEST: CPT | Performed by: PHYSICIAN ASSISTANT

## 2022-02-15 PROCEDURE — 87086 URINE CULTURE/COLONY COUNT: CPT | Performed by: PHYSICIAN ASSISTANT

## 2022-02-15 PROCEDURE — 36415 COLL VENOUS BLD VENIPUNCTURE: CPT | Performed by: PHYSICIAN ASSISTANT

## 2022-02-15 PROCEDURE — 86780 TREPONEMA PALLIDUM: CPT | Performed by: PHYSICIAN ASSISTANT

## 2022-02-15 PROCEDURE — 86900 BLOOD TYPING SEROLOGIC ABO: CPT | Performed by: PHYSICIAN ASSISTANT

## 2022-02-15 PROCEDURE — 86762 RUBELLA ANTIBODY: CPT | Performed by: PHYSICIAN ASSISTANT

## 2022-02-15 PROCEDURE — 99213 OFFICE O/P EST LOW 20 MIN: CPT | Performed by: PHYSICIAN ASSISTANT

## 2022-02-15 PROCEDURE — 81025 URINE PREGNANCY TEST: CPT | Performed by: PHYSICIAN ASSISTANT

## 2022-02-15 PROCEDURE — 83655 ASSAY OF LEAD: CPT | Mod: 90 | Performed by: PHYSICIAN ASSISTANT

## 2022-02-15 PROCEDURE — 85025 COMPLETE CBC W/AUTO DIFF WBC: CPT | Performed by: PHYSICIAN ASSISTANT

## 2022-02-15 PROCEDURE — 87340 HEPATITIS B SURFACE AG IA: CPT | Performed by: PHYSICIAN ASSISTANT

## 2022-02-15 PROCEDURE — 99207 PR PRENATAL FLOW SHEET: CPT | Performed by: PHYSICIAN ASSISTANT

## 2022-02-15 PROCEDURE — 86901 BLOOD TYPING SEROLOGIC RH(D): CPT | Performed by: PHYSICIAN ASSISTANT

## 2022-02-15 PROCEDURE — 83036 HEMOGLOBIN GLYCOSYLATED A1C: CPT | Performed by: PHYSICIAN ASSISTANT

## 2022-02-15 PROCEDURE — 87389 HIV-1 AG W/HIV-1&-2 AB AG IA: CPT | Performed by: PHYSICIAN ASSISTANT

## 2022-02-15 PROCEDURE — 81003 URINALYSIS AUTO W/O SCOPE: CPT | Performed by: PHYSICIAN ASSISTANT

## 2022-02-15 RX ORDER — SWAB
SWAB, NON-MEDICATED MISCELLANEOUS
Qty: 100 TABLET | Refills: 3 | Status: SHIPPED | OUTPATIENT
Start: 2022-02-15 | End: 2022-06-08

## 2022-02-15 NOTE — LETTER
02/15/22          To Whom It May Concern:      Artie Rapp (1995) is pregnant.  Estimated Date of Delivery: Sep 19, 2022        Sincerely,    Meghan Null PA-C

## 2022-02-15 NOTE — PATIENT INSTRUCTIONS
Pregnancy: 9 weeks    Due Date: September 19, 2022    Next visit in 4 weeks  With Dr. Mariee  For Your First OB Visit        Someone should be calling you within 2-3 days to schedule your ultrasound.  Let us know if you haven't heard from anyone in 1 week.  You can also call them to schedule it yourself if you prefer.  If you would like to schedule your ultrasound yourself, call #[phone].

## 2022-02-16 LAB
BACTERIA UR CULT: NO GROWTH
RUBV IGG SERPL QL IA: 0.92 INDEX
RUBV IGG SERPL QL IA: ABNORMAL
T PALLIDUM AB SER QL: NONREACTIVE

## 2022-02-17 NOTE — PROGRESS NOTES
ASSESSMENT and PLAN:  1. Pregnancy Intake Appointment  Artie Rapp is 26 year old and .  Patient's last menstrual period was 2021 (exact date).  Estimated Date of Delivery: Sep 19, 2022  She will be seeing Dr. Mariee for OB care at [facility].  Screening pregnancy lab work was drawn.  Prenatal vitamin prescribed.  Problem list and current medications reviewed and updated.  Dating ultrasound ordered.  Potential exposures/risks discussed: work environment, raw meat/seafood/deli meat, home construction, cats, caffeine.  First trimester genetic screening test was discussed with patient.    She is undecided regarding Nuchal translucency ultrasound.  She will let us know within the next 2 weeks if she wants this done.  Follow up in 4 weeks.        HPI:  Artie Rapp is a 26 year old female here for pregnancy intake.  She is .  Patient's last menstrual period was 2021 (exact date).  Positive home pregnancy test around 22.    Past Medical History:   Diagnosis Date     Anemia     borderline     Urinary tract infection      Vaginal delivery 2019    Delivered on the toilet.  Coupling noted.  arom of clear fluid.  No meds.  Received cytotec and pitocin for bleeding.       Vaginal delivery 2018    Unmedicated.  srom of clear fluid.  Delivered quickly after this.  Left labial laceration repaired.          Past Surgical History:   Procedure Laterality Date     DILATION AND CURETTAGE N/A 2020    Procedure: SUCTION D&C;  Surgeon: Stephanie Pham DO;  Location: [facility];  Service: Obstetrics        Current Outpatient Medications   Medication     docusate sodium (COLACE) 100 MG capsule     ferrous gluconate (FERGON) 324 MG tablet     Prenatal Vit-Fe Fumarate-FA (PRENATAL MULTIVITAMIN  WITH IRON) 28-0.8 MG TABS     No current facility-administered medications for this visit.        Social History     Socioeconomic History     Marital status: Single     Spouse name: Not on  file     Number of children: Not on file     Years of education: Not on file     Highest education level: Not on file   Occupational History     Not on file   Tobacco Use     Smoking status: Never Smoker     Smokeless tobacco: Never Used     Tobacco comment: no passive exposure   Substance and Sexual Activity     Alcohol use: No     Drug use: No     Sexual activity: Yes     Partners: Male     Birth control/protection: None   Other Topics Concern     Not on file   Social History Narrative     Not on file     Social Determinants of Health     Financial Resource Strain: Not on file   Food Insecurity: Not on file   Transportation Needs: Not on file   Physical Activity: Not on file   Stress: Not on file   Social Connections: Not on file   Intimate Partner Violence: Not on file   Housing Stability: Not on file          OBJECTIVE:  No Known Allergies  /67 (BP Location: Right arm, Patient Position: Sitting, Cuff Size: Adult Regular)   Pulse 62   Temp 98.1  F (36.7  C)   Wt 50.8 kg (112 lb)   LMP 12/13/2021 (Exact Date)   SpO2 99%   BMI 23.41 kg/m     Body mass index is 23.41 kg/m .     Vital signs stable as recorded above.  General: Patient is alert and oriented x 3, in no apparent distress  Remainder of physical exam deferred to patient's First OB Visit.      Office Visit on 02/15/2022   Component Date Value Ref Range Status     hCG Urine Qualitative 02/15/2022 Positive (A) Negative Final     Culture 02/15/2022 No Growth   Final     Hepatitis B Surface Antigen 02/15/2022 Nonreactive  Nonreactive Final     HIV Antigen Antibody Combo 02/15/2022 Negative  Negative Final     Rubella Arminda IgG Instrument Value 02/15/2022 0.92 (A) >=1.00 Index Final     Rubella Antibody IgG 02/15/2022 Equivocal, please recollect. (A) Positive Final     Treponema Antibody Total 02/15/2022 Nonreactive  Nonreactive Final     Hemoglobin A1C 02/15/2022 5.3  0.0 - 5.6 % Final     Hepatitis C Antibody 02/15/2022 Negative  Negative Final      ABO/RH(D) 02/15/2022 B POS   Final     Antibody Screen 02/15/2022 Negative  Negative Final     SPECIMEN EXPIRATION DATE 02/15/2022 [id number]   Final     WBC Count 02/15/2022 5.5  4.0 - 11.0 10e3/uL Final     RBC Count 02/15/2022 4.07  3.80 - 5.20 10e6/uL Final     Hemoglobin 02/15/2022 11.4 (A) 11.7 - 15.7 g/dL Final     Hematocrit 02/15/2022 35.6  35.0 - 47.0 % Final     MCV 02/15/2022 88  78 - 100 fL Final     MCH 02/15/2022 28.0  26.5 - 33.0 pg Final     MCHC 02/15/2022 32.0  31.5 - 36.5 g/dL Final     RDW 02/15/2022 14.3  10.0 - 15.0 % Final     Platelet Count 02/15/2022 324  150 - 450 10e3/uL Final     % Neutrophils 02/15/2022 70  % Final     % Lymphocytes 02/15/2022 24  % Final     % Monocytes 02/15/2022 5  % Final     % Eosinophils 02/15/2022 1  % Final     % Basophils 02/15/2022 0  % Final     % Immature Granulocytes 02/15/2022 0  % Final     Absolute Neutrophils 02/15/2022 3.9  1.6 - 8.3 10e3/uL Final     Absolute Lymphocytes 02/15/2022 1.3  0.8 - 5.3 10e3/uL Final     Absolute Monocytes 02/15/2022 0.3  0.0 - 1.3 10e3/uL Final     Absolute Eosinophils 02/15/2022 0.0  0.0 - 0.7 10e3/uL Final     Absolute Basophils 02/15/2022 0.0  0.0 - 0.2 10e3/uL Final     Absolute Immature Granulocytes 02/15/2022 0.0  <=0.4 10e3/uL Final     Glucose Urine 02/15/2022 Negative  Negative mg/dL Final     Ketones Urine 02/15/2022 Negative  Negative mg/dL Final     Protein Albumin Urine 02/15/2022 Negative  Negative mg/dL Final          Other screening pregnancy lab work is pending.      Please see OB Episode for complete details.    This dictation uses voice recognition software, which may contain typographical errors.

## 2022-02-20 LAB — LEAD BLDV-MCNC: <2 UG/DL

## 2022-02-24 ENCOUNTER — TELEPHONE (OUTPATIENT)
Dept: FAMILY MEDICINE | Facility: CLINIC | Age: 27
End: 2022-02-24
Payer: COMMERCIAL

## 2022-02-24 ENCOUNTER — HOSPITAL ENCOUNTER (OUTPATIENT)
Dept: ULTRASOUND IMAGING | Facility: HOSPITAL | Age: 27
Discharge: HOME | End: 2022-02-24
Attending: PHYSICIAN ASSISTANT | Admitting: PHYSICIAN ASSISTANT
Payer: COMMERCIAL

## 2022-02-24 DIAGNOSIS — O02.1 MISSED ABORTION: Primary | ICD-10-CM

## 2022-02-24 DIAGNOSIS — Z3A.09 9 WEEKS GESTATION OF PREGNANCY: ICD-10-CM

## 2022-02-24 PROCEDURE — 76801 OB US < 14 WKS SINGLE FETUS: CPT

## 2022-02-25 ENCOUNTER — MYC MEDICAL ADVICE (OUTPATIENT)
Dept: FAMILY MEDICINE | Facility: CLINIC | Age: 27
End: 2022-02-25
Payer: COMMERCIAL

## 2022-02-25 DIAGNOSIS — O28.3 ABNORMAL OBSTETRIC ULTRASOUND SCAN: Primary | ICD-10-CM

## 2022-02-25 DIAGNOSIS — O02.89 NONVIABLE PREGNANCY: ICD-10-CM

## 2022-02-25 NOTE — TELEPHONE ENCOUNTER
Called by radiology due to abnormal OB ultrasound.  Ultrasound showed gestational sac measuring 1.9 x 2.2 cm consistent with 7 week size, but no fetal pole identified.  Cystic structure with debris in the uterus.  Not a viable pregnancy.    Spoke with patient on the phone discussing the findings. Patient notes she got the ultrasound to confirm fetal viability.  Her LMP was 12/13/2021 - exact as she was tracking with an hien, which should have put her at 10 weeks 3 days gestation.  She was not having any bleeding or cramping.      Explained findings to patient informing her that this is not likely a viable pregnancy.  Patient noted that she had a miscarriage in 2020 and chose to get a D+C.  She had a normal delivery in 2021.  She would prefer to pursue a D+C again.  She would like a referral placed to [facility].  Discussed there is a possibility of natural miscarriage and we discussed what to expect as far as symptoms and when to be evaluated.  All questions answered.

## 2022-02-28 ENCOUNTER — PRENATAL OFFICE VISIT (OUTPATIENT)
Dept: FAMILY MEDICINE | Facility: CLINIC | Age: 27
End: 2022-02-28
Payer: COMMERCIAL

## 2022-02-28 VITALS
BODY MASS INDEX: 23.62 KG/M2 | WEIGHT: 113 LBS | HEART RATE: 67 BPM | SYSTOLIC BLOOD PRESSURE: 110 MMHG | DIASTOLIC BLOOD PRESSURE: 58 MMHG | OXYGEN SATURATION: 99 % | RESPIRATION RATE: 16 BRPM

## 2022-02-28 DIAGNOSIS — D50.8 IRON DEFICIENCY ANEMIA SECONDARY TO INADEQUATE DIETARY IRON INTAKE: ICD-10-CM

## 2022-02-28 DIAGNOSIS — Z34.80 SUPERVISION OF OTHER NORMAL PREGNANCY: ICD-10-CM

## 2022-02-28 DIAGNOSIS — D25.9 UTERINE LEIOMYOMA, UNSPECIFIED LOCATION: ICD-10-CM

## 2022-02-28 DIAGNOSIS — O20.0 THREATENED MISCARRIAGE IN EARLY PREGNANCY: Primary | ICD-10-CM

## 2022-02-28 LAB
ERYTHROCYTE [DISTWIDTH] IN BLOOD BY AUTOMATED COUNT: 13.8 % (ref 10–15)
HCG SERPL-ACNC: 3266 MLU/ML (ref 0–4)
HCT VFR BLD AUTO: 33.6 % (ref 35–47)
HGB BLD-MCNC: 10.8 G/DL (ref 11.7–15.7)
IRON SATN MFR SERPL: 6 % (ref 15–46)
IRON SERPL-MCNC: 31 UG/DL (ref 35–180)
MCH RBC QN AUTO: 27.8 PG (ref 26.5–33)
MCHC RBC AUTO-ENTMCNC: 32.1 G/DL (ref 31.5–36.5)
MCV RBC AUTO: 87 FL (ref 78–100)
PLATELET # BLD AUTO: 285 10E3/UL (ref 150–450)
RBC # BLD AUTO: 3.88 10E6/UL (ref 3.8–5.2)
TIBC SERPL-MCNC: 484 UG/DL (ref 240–430)
WBC # BLD AUTO: 5.9 10E3/UL (ref 4–11)

## 2022-02-28 PROCEDURE — 85027 COMPLETE CBC AUTOMATED: CPT | Performed by: FAMILY MEDICINE

## 2022-02-28 PROCEDURE — 83550 IRON BINDING TEST: CPT | Performed by: FAMILY MEDICINE

## 2022-02-28 PROCEDURE — 83021 HEMOGLOBIN CHROMOTOGRAPHY: CPT | Performed by: FAMILY MEDICINE

## 2022-02-28 PROCEDURE — 99214 OFFICE O/P EST MOD 30 MIN: CPT | Performed by: FAMILY MEDICINE

## 2022-02-28 PROCEDURE — 36415 COLL VENOUS BLD VENIPUNCTURE: CPT | Performed by: FAMILY MEDICINE

## 2022-02-28 PROCEDURE — 84702 CHORIONIC GONADOTROPIN TEST: CPT | Performed by: FAMILY MEDICINE

## 2022-02-28 PROCEDURE — 83020 HEMOGLOBIN ELECTROPHORESIS: CPT | Performed by: FAMILY MEDICINE

## 2022-02-28 NOTE — PROGRESS NOTES
Office Visit  [facility] - Family Medicine  Date of Service: 2022      Subjective   Pa BRITTNY Rapp is a 26 year old female who presents for   Chief Complaint   Patient presents with     Miscarriage      - US showed sac without fetal pole   - Started spotting, then period like bleeding.   - started coming heavily, bad cramping, passed tissue. Large hand-sized tissue   - bleeding lighter, cramping gone    This was an unplanned pregnancy. She does not want contraception at this time.    She was told after the birth of her last child that she had a fibroid. This was not mentioned in her ultrasound reports, but she wonders if that is causing problems with miscarriages.    Anemic since 2018, prior to birth of first child. Has been taking iron pills, but blood counts don't seem to come up. She has no digestive problems in general or problems tolerating oral iron. She feels tired, irritable, and has poor exercise tolerance. Periods are not normally heavy.     OB History    Para Term  AB Living   5 3 3 0 1 3   SAB IAB Ectopic Multiple Live Births   1 0 0 0 3      # Outcome Date GA Lbr Markos/2nd Weight Sex Delivery Anes PTL Lv   5 AB 22 10w6d    SAB         Birth Comments: Spontaneous miscarriage. Uncomplicated.   4 Term 21 39w2d 06:50 / 00:01 3.09 kg (6 lb 13 oz) F Vag-Spont None N CHRIS      Name: SONIYA,FEMALE-PA      Apgar1: 6  Apgar5: 9   3 SAB 20 11w6d             Birth Comments: missed SAB, needed D&C   2 Term 19 39w2d  2.801 kg (6 lb 2.8 oz) F Vag-Spont None N CHRIS      Name: Yenglai      Apgar1: 8  Apgar5: 9   1 Term 18 38w0d 02:25 / 00:05 2.28 kg (5 lb 0.4 oz) F Vag-Spont Local N CHRIS      Name: Candidaeliua      Apgar1: 8  Apgar5: 9         Objective   /58 (BP Location: Left arm, Patient Position: Sitting, Cuff Size: Adult Regular)   Pulse 67   Resp 16   Wt 51.3 kg (113 lb)   LMP 2021 (Exact Date)   SpO2 99%   Breastfeeding  Unknown   BMI 23.62 kg/m     She reports that she has never smoked. She has never used smokeless tobacco.    Gen: Alert, no apparent distress.  Abd: uterus non-enlarged, non-tender    US OB <14 Weeks w Transvaginal Single ([id number])    Result Date: 2/24/2022  EXAM: US OB <14 WEEKS WITH TRANSVAGINAL SINGLE LOCATION: [facility] DATE/TIME: 2/24/2022 6:41 PM INDICATION: dating, LMP 12 13 21 COMPARISON: None. TECHNIQUE: Transabdominal scans were performed. Endovaginal ultrasound was performed to better visualize the embryo. FINDINGS: UTERUS: Cystic structure with intrinsic debris located in the uterus measuring 1.9 x 2 x 2.2 cm. Mean sac diameter of 2 cm suggests 7 weeks and 0 days size. No fetal pole identified. RIGHT OVARY: Normal. LEFT OVARY: Normal.     IMPRESSION: 1.  Cystic structure with intrinsic debris identified within the uterus. This suggests an abnormal gestational sac or pseudogestational sac. No fetal pole or viable intrauterine pregnancy identified. 2.  No obvious ectopic pregnancy seen. 3.  Consider follow-up beta-hCG levels as warranted. Findings verbally communicated to on-call physician at 7:40 PM on 02/24/2022.       Assessment & Plan     1. Complete miscarriage by history. Check HCG level. RH+, does not need rhogam. Not planning pregnancy, declines contraception.  2. Possible fibroid. Check pelvic US to assess for fibroid uterus.  3. Normocytic anemia - not improving despite iron supplementation. Check labs.       Order Summary                                                      Threatened miscarriage in early pregnancy  -     hCG Quantitative Pregnancy; Future  -     hCG Quantitative Pregnancy    Pregnancy    Uterine leiomyoma, unspecified location  -     US Pelvic Complete with Transvaginal; Future    Iron deficiency anemia secondary to inadequate dietary iron intake  -     Iron and iron binding capacity; Future  -     CBC with platelets; Future  -      Hemoglobinopathy/Thalassemia Cascade; Future  -     Iron and iron binding capacity  -     CBC with platelets  -     Hemoglobinopathy/Thalassemia Cascade    Other orders  -     REVIEW OF HEALTH MAINTENANCE PROTOCOL ORDERS            No future appointments.    Completed by: Lynda Mariee M.D., [facility]. 2/28/2022 12:25 PM.  This transcription uses voice recognition software, which may contain typographical errors.

## 2022-02-28 NOTE — TELEPHONE ENCOUNTER
RN called patient to confirm that she will be coming in to her appointment today at noon with Dr. Mariee.       Patient verbalized understanding and agreed with the plan.        Yanet Richards RN  [facility]

## 2022-03-02 LAB
HEMOGLOBIN A2 QUANTITATION: 3 % (ref 2.2–3.5)
HEMOGLOBIN ELECTROPHRESIS: NORMAL
HEMOGLOBIN F QUANTITATION: 1.7 % (ref 0–2)
PATH ICD:: NORMAL
REVIEWING PATHOLOGIST: NORMAL

## 2022-03-06 ENCOUNTER — HEALTH MAINTENANCE LETTER (OUTPATIENT)
Age: 27
End: 2022-03-06

## 2022-03-10 ENCOUNTER — HOSPITAL ENCOUNTER (OUTPATIENT)
Dept: ULTRASOUND IMAGING | Facility: HOSPITAL | Age: 27
Discharge: HOME | End: 2022-03-10
Attending: FAMILY MEDICINE | Admitting: FAMILY MEDICINE
Payer: COMMERCIAL

## 2022-03-10 DIAGNOSIS — D25.9 UTERINE LEIOMYOMA, UNSPECIFIED LOCATION: ICD-10-CM

## 2022-03-10 PROCEDURE — 76856 US EXAM PELVIC COMPLETE: CPT

## 2022-03-13 ENCOUNTER — HEALTH MAINTENANCE LETTER (OUTPATIENT)
Age: 27
End: 2022-03-13

## 2022-04-05 ENCOUNTER — OFFICE VISIT (OUTPATIENT)
Dept: FAMILY MEDICINE | Facility: CLINIC | Age: 27
End: 2022-04-05
Payer: COMMERCIAL

## 2022-04-05 VITALS
BODY MASS INDEX: 24.59 KG/M2 | HEIGHT: 60 IN | WEIGHT: 125.25 LBS | DIASTOLIC BLOOD PRESSURE: 60 MMHG | HEART RATE: 91 BPM | TEMPERATURE: 98.6 F | OXYGEN SATURATION: 100 % | SYSTOLIC BLOOD PRESSURE: 104 MMHG

## 2022-04-05 DIAGNOSIS — N89.8 VAGINAL DISCHARGE: ICD-10-CM

## 2022-04-05 DIAGNOSIS — Z11.59 NEED FOR HEPATITIS C SCREENING TEST: ICD-10-CM

## 2022-04-05 DIAGNOSIS — Z00.00 ADULT GENERAL MEDICAL EXAM: Primary | ICD-10-CM

## 2022-04-05 DIAGNOSIS — Z12.4 CERVICAL CANCER SCREENING: ICD-10-CM

## 2022-04-05 DIAGNOSIS — L30.9 DERMATITIS: ICD-10-CM

## 2022-04-05 LAB
CLUE CELLS: ABNORMAL
TRICHOMONAS, WET PREP: ABNORMAL
WBC'S/HIGH POWER FIELD, WET PREP: ABNORMAL
YEAST, WET PREP: ABNORMAL

## 2022-04-05 PROCEDURE — G0123 SCREEN CERV/VAG THIN LAYER: HCPCS | Performed by: FAMILY MEDICINE

## 2022-04-05 PROCEDURE — 87491 CHLMYD TRACH DNA AMP PROBE: CPT | Performed by: FAMILY MEDICINE

## 2022-04-05 PROCEDURE — 99395 PREV VISIT EST AGE 18-39: CPT | Performed by: FAMILY MEDICINE

## 2022-04-05 PROCEDURE — 87210 SMEAR WET MOUNT SALINE/INK: CPT | Performed by: FAMILY MEDICINE

## 2022-04-05 PROCEDURE — 87591 N.GONORRHOEAE DNA AMP PROB: CPT | Performed by: FAMILY MEDICINE

## 2022-04-05 RX ORDER — TRIAMCINOLONE ACETONIDE 1 MG/G
CREAM TOPICAL 2 TIMES DAILY
Qty: 30 G | Refills: 1 | Status: SHIPPED | OUTPATIENT
Start: 2022-04-05 | End: 2023-03-30

## 2022-04-05 NOTE — PROGRESS NOTES
SUBJECTIVE:   CC: Pa Jazz Perkins is an 26 year old woman who presents for preventive health visit.         Has itchiness of pubic area- comes and goes -   Feels dry - puts Vaseline on it and feels more itchy  X several months      Healthy Habits:     Getting at least 3 servings of Calcium per day:  Yes    Bi-annual eye exam:  NO    Dental care twice a year:  NO    Sleep apnea or symptoms of sleep apnea:  None    Diet:  Regular (no restrictions)    Frequency of exercise:  None    Taking medications regularly:  Yes    Barriers to taking medications:  None    Medication side effects:  None    PHQ-2 Total Score: 0    Additional concerns today:  No              Today's PHQ-2 Score:   PHQ-2 ( 1999 Pf[employer]) 4/5/2022   Q1: Little interest or pleasure in doing things 0   Q2: Feeling down, depressed or hopeless 0   PHQ-2 Score 0   Q1: Little interest or pleasure in doing things Not at all   Q2: Feeling down, depressed or hopeless Not at all   PHQ-2 Score 0       Abuse: Current or Past (Physical, Sexual or Emotional) - no  Do you feel safe in your environment? yes        Social History     Tobacco Use     Smoking status: Never Smoker     Smokeless tobacco: Never Used     Tobacco comment: No exposure to second hand smoking.   Substance Use Topics     Alcohol use: No     If you drink alcohol do you typically have >3 drinks per day or >7 drinks per week? No    Alcohol Use 4/5/2022   Prescreen: >3 drinks/day or >7 drinks/week? No   Prescreen: >3 drinks/day or >7 drinks/week? -   No flowsheet data found.    Reviewed orders with patient.  Reviewed health maintenance and updated orders accordingly - Yes  BP Readings from Last 3 Encounters:   04/05/22 104/60   12/08/19 110/71    Wt Readings from Last 3 Encounters:   04/05/22 56.8 kg (125 lb 4 oz)   12/08/19 45.4 kg (100 lb)   08/09/19 44 kg (97 lb)                  Patient Active Problem List   Diagnosis     Acne Vulgaris     Twin, Mate Liveborn     Congenital Scoliosis     Abdominal  Pain     Nephrolithiasis     Asymptomatic microscopic hematuria     No past surgical history on file.    Social History     Tobacco Use     Smoking status: Never Smoker     Smokeless tobacco: Never Used     Tobacco comment: No exposure to second hand smoking.   Substance Use Topics     Alcohol use: No     Family History   Problem Relation Age of Onset     No Known Problems Mother      No Known Problems Father      No Known Problems Sister      No Known Problems Brother          Current Outpatient Medications   Medication Sig Dispense Refill     triamcinolone (KENALOG) 0.1 % external cream Apply topically 2 times daily Apply topically sparingly to affected area only - no longer than 14 days. 30 g 1     No Known Allergies    Breast Cancer Screening:    Breast CA Risk Assessment (FHS-7) 4/5/2022   Do you have a family history of breast, colon, or ovarian cancer? No / Unknown         Patient under 40 years of age: Routine Mammogram Screening not recommended.   Pertinent mammograms are reviewed under the imaging tab.    History of abnormal Pap smear: NO - age 21-29 PAP every 3 years recommended  PAP / HPV Latest Ref Rng & Units 4/5/2022 11/1/2017   PAP   Negative for Intraepithelial Lesion or Malignancy (NILM) Negative for squamous intraepithelial lesion or malignancy  Electronically signed by Nida Gilmore CT (ASCP) on 11/9/2017 at  1:29 PM       Reviewed and updated as needed this visit by clinical staff   Tobacco  Allergies  Meds                Reviewed and updated as needed this visit by Provider                   No past medical history on file.   No past surgical history on file.  OB History   No obstetric history on file.       Review of Systems   Genitourinary:        Perineal/pubic itching   All other systems reviewed and are negative.         OBJECTIVE:   /60   Pulse 91   Temp 98.6  F (37  C)   Ht 1.524 m (5')   Wt 56.8 kg (125 lb 4 oz)   LMP 03/29/2022   SpO2 100%   BMI 24.46 kg/m     Physical Exam  Constitutional:       General: She is not in acute distress.     Appearance: She is well-developed.   HENT:      Right Ear: Tympanic membrane and external ear normal.      Left Ear: Tympanic membrane and external ear normal.      Nose: Nose normal.      Mouth/Throat:      Pharynx: No oropharyngeal exudate.   Eyes:      General:         Right eye: No discharge.         Left eye: No discharge.      Conjunctiva/sclera: Conjunctivae normal.      Pupils: Pupils are equal, round, and reactive to light.   Neck:      Thyroid: No thyromegaly.      Trachea: No tracheal deviation.   Cardiovascular:      Rate and Rhythm: Normal rate and regular rhythm.      Pulses: Normal pulses.      Heart sounds: Normal heart sounds, S1 normal and S2 normal. No murmur heard.    No friction rub. No S3 or S4 sounds.   Pulmonary:      Effort: Pulmonary effort is normal. No respiratory distress.      Breath sounds: Normal breath sounds. No wheezing or rales.   Chest:   Breasts:      Right: No mass, nipple discharge or tenderness.      Left: No mass, nipple discharge or tenderness.       Abdominal:      General: Bowel sounds are normal.      Palpations: Abdomen is soft. There is no mass.      Tenderness: There is no abdominal tenderness.   Genitourinary:     Comments: PELVIC EXAM:External genitalia: mild sl raised rash at suprapubic area  Vaginal mucosa normal  Vaginal discharge: none  Speculum exam shows a normal appearing cervix .   Bimanual exam: Cervix closed, firm, non tender  to motion.  Uterus  firm, regular, mobile, non tender to palpation. No adnexal masses or tenderness.     Musculoskeletal:         General: Normal range of motion.      Cervical back: Neck supple.   Lymphadenopathy:      Cervical: No cervical adenopathy.   Skin:     General: Skin is warm and dry.      Findings: No rash.   Neurological:      Mental Status: She is alert and oriented to person, place, and time.      Motor: No abnormal muscle tone.      Deep  Tendon Reflexes: Reflexes are normal and symmetric.   Psychiatric:         Thought Content: Thought content normal.         Judgment: Judgment normal.         Diagnostic Test Results:  Labs reviewed in Epic  Results for orders placed or performed in visit on 04/05/22   Pap Screen reflex to HPV if ASCUS - recommend age 25 - 29     Status: None   Result Value Ref Range    Interpretation        Negative for Intraepithelial Lesion or Malignancy (NILM)    Comment         Papanicolaou Test Limitations:  Cervical cytology is a screening test with limited sensitivity, and regular screening is critical for cancer prevention.  Pap tests are primarily effective for the diagnosis/prevention of squamous cell carcinoma, not adenocarcinoma or other cancers.        Specimen Adequacy       Satisfactory for evaluation, endocervical/transformation zone component present    Clinical Information       none      LMP/Menopause Date       3/29/2022      Reflex Testing Yes if ASCUS     Previous Abnormal?       No      Performing Labs       The technical component of this testing was completed at [facility]     Wet prep - Clinic Collect     Status: Abnormal    Specimen: Vagina; Swab   Result Value Ref Range    Trichomonas Absent Absent    Yeast Absent Absent    Clue Cells Absent Absent    WBCs/high power field 1+ (A) None   Chlamydia trachomatis/Neisseria gonorrhoeae by PCR - Clinic Collect     Status: Normal    Specimen: Cervix; Swab   Result Value Ref Range    Chlamydia Trachomatis Negative Negative    Neisseria gonorrhoeae Negative Negative       ASSESSMENT/PLAN:   1. Adult general medical exam  This is a 27 yo female here for physical exam; reviewed    - REVIEW OF HEALTH MAINTENANCE PROTOCOL ORDERS    2. Vaginal discharge  There is small amount of vaginal discharge - swabbed for evaluation for infection  - Wet prep - Clinic Collect  - Chlamydia trachomatis/Neisseria gonorrhoeae by PCR - Clinic  Collect    3. Dermatitis  There is a small ring of irritated tissue in suprapubic area - try triamcinolone cream  - triamcinolone (KENALOG) 0.1 % external cream; Apply topically 2 times daily Apply topically sparingly to affected area only - no longer than 14 days.  Dispense: 30 g; Refill: 1    4. Cervical cancer screening  Due for cervix cancer scfreening - pap sent   - Pap Screen reflex to HPV if ASCUS - recommend age 25 - 29; Future  - Pap Screen reflex to HPV if ASCUS - recommend age 25 - 29    5. Need for hepatitis C screening test  Due for hep C screening -low risk      Patient has been advised of split billing requirements and indicates understanding: Yes    COUNSELING:  Reviewed preventive health counseling, as reflected in patient instructions       Regular exercise       Healthy diet/nutrition       Vision screening    Estimated body mass index is 24.46 kg/m  as calculated from the following:    Height as of this encounter: 1.524 m (5').    Weight as of this encounter: 56.8 kg (125 lb 4 oz).        She reports that she has never smoked. She has never used smokeless tobacco.      Counseling Resources:  ATP IV Guidelines  Pooled Cohorts Equation Calculator  Breast Cancer Risk Calculator  BRCA-Related Cancer Risk Assessment: FHS-7 Tool  FRAX Risk Assessment  ICSI Preventive Guidelines  Dietary Guidelines for Americans, 2010  USDA's MyPlate  ASA Prophylaxis  Lung CA Screening    MORALES LIMA MD  [facility]

## 2022-04-06 LAB
BKR LAB AP GYN ADEQUACY: NORMAL
BKR LAB AP GYN INTERPRETATION: NORMAL
BKR LAB AP HPV REFLEX: NORMAL
BKR LAB AP LMP: NORMAL
BKR LAB AP PREVIOUS ABNORMAL: NORMAL
C TRACH DNA SPEC QL PROBE+SIG AMP: NEGATIVE
N GONORRHOEA DNA SPEC QL NAA+PROBE: NEGATIVE
PATH REPORT.COMMENTS IMP SPEC: NORMAL
PATH REPORT.COMMENTS IMP SPEC: NORMAL
PATH REPORT.RELEVANT HX SPEC: NORMAL

## 2022-04-26 NOTE — TELEPHONE ENCOUNTER
New Appointment Needed  What is the reason for the visit:    Mantoux Placement  Appt Request  What is the purpose of the mantoux?:  School: [employer]  Is there a form to be completed?:   No  How soon do you need the mantoux placed?:  date: needs to be read by July 31.As soon as possible.    Provider Preference: Any available  How soon do you need to be seen?: next week  Waitlist offered?: No  Okay to leave a detailed message:  Yes     Detail Level: Detailed

## 2022-05-23 ENCOUNTER — TELEPHONE (OUTPATIENT)
Dept: SCHEDULING | Facility: CLINIC | Age: 27
End: 2022-05-23

## 2022-05-23 NOTE — TELEPHONE ENCOUNTER
Reason for Call:  Other appointment    Detailed comments: First prenatal appt/confirmation     Phone Number Patient can be reached at: Cell number on file:    Telephone Information:   Mobile [phone]       Best Time: Anytime     Can we leave a detailed message on this number? YES    Call taken on 5/23/2022 at 3:19 PM by Tejal Stover

## 2022-05-24 NOTE — TELEPHONE ENCOUNTER
Patient has a future F2F appointment on 06/08/2022 @ 10:20 am with BERNARD Rothman. Completing task.

## 2022-06-08 ENCOUNTER — PRENATAL OFFICE VISIT (OUTPATIENT)
Dept: FAMILY MEDICINE | Facility: CLINIC | Age: 27
End: 2022-06-08
Payer: COMMERCIAL

## 2022-06-08 VITALS
HEIGHT: 59 IN | OXYGEN SATURATION: 98 % | BODY MASS INDEX: 22.38 KG/M2 | DIASTOLIC BLOOD PRESSURE: 62 MMHG | WEIGHT: 111 LBS | SYSTOLIC BLOOD PRESSURE: 116 MMHG | HEART RATE: 75 BPM | RESPIRATION RATE: 20 BRPM

## 2022-06-08 DIAGNOSIS — N92.6 MISSED MENSES: ICD-10-CM

## 2022-06-08 DIAGNOSIS — Z3A.09 9 WEEKS GESTATION OF PREGNANCY: Primary | ICD-10-CM

## 2022-06-08 DIAGNOSIS — Z12.4 CERVICAL CANCER SCREENING: ICD-10-CM

## 2022-06-08 LAB
ABO/RH(D): NORMAL
ALBUMIN UR-MCNC: NEGATIVE MG/DL
ANTIBODY SCREEN: NEGATIVE
BASOPHILS # BLD AUTO: 0 10E3/UL (ref 0–0.2)
BASOPHILS NFR BLD AUTO: 0 %
EOSINOPHIL # BLD AUTO: 0 10E3/UL (ref 0–0.7)
EOSINOPHIL NFR BLD AUTO: 0 %
ERYTHROCYTE [DISTWIDTH] IN BLOOD BY AUTOMATED COUNT: 15.5 % (ref 10–15)
GLUCOSE UR STRIP-MCNC: NEGATIVE MG/DL
HBA1C MFR BLD: 5.1 % (ref 0–5.6)
HCG UR QL: POSITIVE
HCT VFR BLD AUTO: 36.2 % (ref 35–47)
HGB BLD-MCNC: 11 G/DL (ref 11.7–15.7)
HIV 1+2 AB+HIV1 P24 AG SERPL QL IA: NEGATIVE
IMM GRANULOCYTES # BLD: 0 10E3/UL
IMM GRANULOCYTES NFR BLD: 0 %
KETONES UR STRIP-MCNC: NEGATIVE MG/DL
LYMPHOCYTES # BLD AUTO: 1.1 10E3/UL (ref 0.8–5.3)
LYMPHOCYTES NFR BLD AUTO: 20 %
MCH RBC QN AUTO: 26.7 PG (ref 26.5–33)
MCHC RBC AUTO-ENTMCNC: 30.4 G/DL (ref 31.5–36.5)
MCV RBC AUTO: 88 FL (ref 78–100)
MONOCYTES # BLD AUTO: 0.3 10E3/UL (ref 0–1.3)
MONOCYTES NFR BLD AUTO: 5 %
NEUTROPHILS # BLD AUTO: 4.2 10E3/UL (ref 1.6–8.3)
NEUTROPHILS NFR BLD AUTO: 75 %
NRBC # BLD AUTO: 0 10E3/UL
NRBC BLD AUTO-RTO: 0 /100
PLATELET # BLD AUTO: 326 10E3/UL (ref 150–450)
RBC # BLD AUTO: 4.12 10E6/UL (ref 3.8–5.2)
SPECIMEN EXPIRATION DATE: NORMAL
T PALLIDUM AB SER QL: NONREACTIVE
WBC # BLD AUTO: 5.7 10E3/UL (ref 4–11)

## 2022-06-08 PROCEDURE — 86901 BLOOD TYPING SEROLOGIC RH(D): CPT | Performed by: PHYSICIAN ASSISTANT

## 2022-06-08 PROCEDURE — 86780 TREPONEMA PALLIDUM: CPT | Performed by: PHYSICIAN ASSISTANT

## 2022-06-08 PROCEDURE — 83036 HEMOGLOBIN GLYCOSYLATED A1C: CPT | Performed by: PHYSICIAN ASSISTANT

## 2022-06-08 PROCEDURE — 86900 BLOOD TYPING SEROLOGIC ABO: CPT | Performed by: PHYSICIAN ASSISTANT

## 2022-06-08 PROCEDURE — 83655 ASSAY OF LEAD: CPT | Mod: 90 | Performed by: PHYSICIAN ASSISTANT

## 2022-06-08 PROCEDURE — 86762 RUBELLA ANTIBODY: CPT | Performed by: PHYSICIAN ASSISTANT

## 2022-06-08 PROCEDURE — 87086 URINE CULTURE/COLONY COUNT: CPT | Performed by: PHYSICIAN ASSISTANT

## 2022-06-08 PROCEDURE — 87340 HEPATITIS B SURFACE AG IA: CPT | Performed by: PHYSICIAN ASSISTANT

## 2022-06-08 PROCEDURE — 99000 SPECIMEN HANDLING OFFICE-LAB: CPT | Performed by: PHYSICIAN ASSISTANT

## 2022-06-08 PROCEDURE — 99213 OFFICE O/P EST LOW 20 MIN: CPT | Performed by: PHYSICIAN ASSISTANT

## 2022-06-08 PROCEDURE — 87389 HIV-1 AG W/HIV-1&-2 AB AG IA: CPT | Performed by: PHYSICIAN ASSISTANT

## 2022-06-08 PROCEDURE — 36415 COLL VENOUS BLD VENIPUNCTURE: CPT | Performed by: PHYSICIAN ASSISTANT

## 2022-06-08 PROCEDURE — 81003 URINALYSIS AUTO W/O SCOPE: CPT | Performed by: PHYSICIAN ASSISTANT

## 2022-06-08 PROCEDURE — 99207 PR PRENATAL FLOW SHEET: CPT | Performed by: PHYSICIAN ASSISTANT

## 2022-06-08 PROCEDURE — 85025 COMPLETE CBC W/AUTO DIFF WBC: CPT | Performed by: PHYSICIAN ASSISTANT

## 2022-06-08 PROCEDURE — 86850 RBC ANTIBODY SCREEN: CPT | Performed by: PHYSICIAN ASSISTANT

## 2022-06-08 PROCEDURE — 86803 HEPATITIS C AB TEST: CPT | Performed by: PHYSICIAN ASSISTANT

## 2022-06-08 PROCEDURE — 81025 URINE PREGNANCY TEST: CPT | Performed by: PHYSICIAN ASSISTANT

## 2022-06-08 RX ORDER — PNV NO.95/FERROUS FUM/FOLIC AC 28MG-0.8MG
1 TABLET ORAL DAILY
Qty: 100 CAPSULE | Refills: 3 | Status: SHIPPED | OUTPATIENT
Start: 2022-06-08 | End: 2023-07-25

## 2022-06-08 NOTE — PATIENT INSTRUCTIONS
Pregnancy: 9 weeks    Due Date: January 5, 2023    Next visit in 4 weeks  With Dr. Isaac  For Your First OB Visit    You will see Dr. Carvajal for the rest of your OB Care and delivery, when she is back in the office in September.        Someone should be calling you within 2-3 days to schedule your ultrasound appointment.  If you would like to schedule your ultrasound yourself, call #[phone].

## 2022-06-08 NOTE — LETTER
06/08/22          To Whom It May Concern:      Artie Rapp ([id number]) is pregnant.  Estimated Date of Delivery: Jan 5, 2023        Sincerely,    Meghan Null PA-C

## 2022-06-09 LAB
HBV SURFACE AG SERPL QL IA: NONREACTIVE
HCV AB SERPL QL IA: NEGATIVE
RUBV IGG SERPL QL IA: 1.02 INDEX
RUBV IGG SERPL QL IA: POSITIVE

## 2022-06-10 LAB
BACTERIA UR CULT: NO GROWTH
LEAD BLDV-MCNC: <2 UG/DL

## 2022-06-12 NOTE — PROGRESS NOTES
ASSESSMENT and PLAN:  1. Pregnancy Intake Appointment  Artie Rapp is 26 year old and .  Patient's last menstrual period was 2022.  Estimated Date of Delivery: 2023  She will be seeing Dr. Carvajal for OB care at [facility].  She will see Dr. Isaac for OB care until  Returns from leave.    Screening pregnancy lab work was drawn.  Prenatal vitamin prescribed.  Problem list and current medications reviewed and updated.  Dating ultrasound ordered.  Potential exposures/risks discussed: work environment, raw meat/seafood/deli meat, home construction, cats, caffeine.  She declines COVID booster for now.  First trimester genetic screening test was discussed with patient.    She declines Nuchal translucency ultrasound.    She may be interested in Quad Screen at the appropriate time.  Follow up in 4 weeks.        HPI:  Artie Rapp is a 26 year old female here for pregnancy intake.  She is .  Patient's last menstrual period was 2022.  Had SAB in 2022. Had negative home pregnancy test after that.    Had positive home pregnancy test in early May, about 1 week after her missed period.    Past Medical History:   Diagnosis Date     Anemia     borderline     Urinary tract infection      Vaginal delivery 2019    Delivered on the toilet.  Coupling noted.  arom of clear fluid.  No meds.  Received cytotec and pitocin for bleeding.       Vaginal delivery 2018    Unmedicated.  srom of clear fluid.  Delivered quickly after this.  Left labial laceration repaired.          Past Surgical History:   Procedure Laterality Date     DILATION AND CURETTAGE N/A 2020    Procedure: SUCTION D&C;  Surgeon: Stephanie Pham DO;  Location: [facility];  Service: Obstetrics        Current Outpatient Medications   Medication     Prenatal MV-Min-Fe Fum-FA-DHA (PRENATAL MULTIVITAMIN PLUS DHA) 27-0.8-250 MG CAPS     No current facility-administered medications for this visit.     "      OBJECTIVE:  No Known Allergies  /62 (BP Location: Left arm, Patient Position: Sitting, Cuff Size: Adult Regular)   Pulse 75   Resp 20   Ht 1.49 m (4' 10.66\")   Wt 50.3 kg (111 lb)   LMP 03/31/2022   SpO2 98%   BMI 22.68 kg/m     Body mass index is 22.68 kg/m .     Vital signs stable as recorded above.  General: Patient is alert and oriented x 3, in no apparent distress  Remainder of physical exam deferred to patient's First OB Visit.      Prenatal Office Visit on 06/08/2022   Component Date Value Ref Range Status     hCG Urine Qualitative 06/08/2022 Positive (A) Negative Final     Culture 06/08/2022 No Growth   Final     Glucose Urine 06/08/2022 Negative  Negative mg/dL Final     Ketones Urine 06/08/2022 Negative  Negative mg/dL Final     Protein Albumin Urine 06/08/2022 Negative  Negative mg/dL Final     Lead Venous Blood 06/08/2022 <2.0  <=4.9 ug/dL Final     Hepatitis B Surface Antigen 06/08/2022 Nonreactive  Nonreactive Final     HIV Antigen Antibody Combo 06/08/2022 Negative  Negative Final     Rubella Arminda IgG Instrument Value 06/08/2022 1.02  <0.90 Index Final     Rubella Antibody IgG 06/08/2022 Positive   Final     Treponema Antibody Total 06/08/2022 Nonreactive  Nonreactive Final     Hemoglobin A1C 06/08/2022 5.1  0.0 - 5.6 % Final     Hepatitis C Antibody 06/08/2022 Negative  Negative Final     ABO/RH(D) 06/08/2022 B POS   Final     Antibody Screen 06/08/2022 Negative  Negative Final     SPECIMEN EXPIRATION DATE 06/08/2022 [id number]   Final     WBC Count 06/08/2022 5.7  4.0 - 11.0 10e3/uL Final     RBC Count 06/08/2022 4.12  3.80 - 5.20 10e6/uL Final     Hemoglobin 06/08/2022 11.0 (A) 11.7 - 15.7 g/dL Final     Hematocrit 06/08/2022 36.2  35.0 - 47.0 % Final     MCV 06/08/2022 88  78 - 100 fL Final     MCH 06/08/2022 26.7  26.5 - 33.0 pg Final     MCHC 06/08/2022 30.4 (A) 31.5 - 36.5 g/dL Final     RDW 06/08/2022 15.5 (A) 10.0 - 15.0 % Final     Platelet Count 06/08/2022 326  150 - " 450 10e3/uL Final     % Neutrophils 06/08/2022 75  % Final     % Lymphocytes 06/08/2022 20  % Final     % Monocytes 06/08/2022 5  % Final     % Eosinophils 06/08/2022 0  % Final     % Basophils 06/08/2022 0  % Final     % Immature Granulocytes 06/08/2022 0  % Final     NRBCs per 100 WBC 06/08/2022 0  <1 /100 Final     Absolute Neutrophils 06/08/2022 4.2  1.6 - 8.3 10e3/uL Final     Absolute Lymphocytes 06/08/2022 1.1  0.8 - 5.3 10e3/uL Final     Absolute Monocytes 06/08/2022 0.3  0.0 - 1.3 10e3/uL Final     Absolute Eosinophils 06/08/2022 0.0  0.0 - 0.7 10e3/uL Final     Absolute Basophils 06/08/2022 0.0  0.0 - 0.2 10e3/uL Final     Absolute Immature Granulocytes 06/08/2022 0.0  <=0.4 10e3/uL Final     Absolute NRBCs 06/08/2022 0.0  10e3/uL Final          Other screening pregnancy lab work is pending.      Please see OB Episode for complete details.    This dictation uses voice recognition software, which may contain typographical errors.

## 2022-06-15 ENCOUNTER — HOSPITAL ENCOUNTER (OUTPATIENT)
Dept: ULTRASOUND IMAGING | Facility: HOSPITAL | Age: 27
Discharge: HOME | End: 2022-06-15
Attending: PHYSICIAN ASSISTANT | Admitting: PHYSICIAN ASSISTANT
Payer: COMMERCIAL

## 2022-06-15 DIAGNOSIS — Z3A.09 9 WEEKS GESTATION OF PREGNANCY: ICD-10-CM

## 2022-06-15 PROCEDURE — 76801 OB US < 14 WKS SINGLE FETUS: CPT

## 2022-07-06 ENCOUNTER — PRENATAL OFFICE VISIT (OUTPATIENT)
Dept: FAMILY MEDICINE | Facility: CLINIC | Age: 27
End: 2022-07-06
Payer: COMMERCIAL

## 2022-07-06 VITALS
HEART RATE: 58 BPM | BODY MASS INDEX: 22.88 KG/M2 | WEIGHT: 112 LBS | DIASTOLIC BLOOD PRESSURE: 58 MMHG | SYSTOLIC BLOOD PRESSURE: 97 MMHG | TEMPERATURE: 97.5 F

## 2022-07-06 DIAGNOSIS — Z12.4 SCREENING FOR CERVICAL CANCER: ICD-10-CM

## 2022-07-06 DIAGNOSIS — Z34.81 ENCOUNTER FOR SUPERVISION OF NORMAL PREGNANCY IN MULTIGRAVIDA IN FIRST TRIMESTER: Primary | ICD-10-CM

## 2022-07-06 PROCEDURE — 99207 PR PRENATAL VISIT: CPT | Performed by: FAMILY MEDICINE

## 2022-07-06 PROCEDURE — G0145 SCR C/V CYTO,THINLAYER,RESCR: HCPCS | Performed by: FAMILY MEDICINE

## 2022-07-06 PROCEDURE — 87591 N.GONORRHOEAE DNA AMP PROB: CPT | Performed by: FAMILY MEDICINE

## 2022-07-06 PROCEDURE — 81003 URINALYSIS AUTO W/O SCOPE: CPT | Performed by: FAMILY MEDICINE

## 2022-07-06 PROCEDURE — 87491 CHLMYD TRACH DNA AMP PROBE: CPT | Performed by: FAMILY MEDICINE

## 2022-07-06 NOTE — PROGRESS NOTES
First OB - desired pregnancy  Will be seeing Dr. Carvajal when she gets back from [facility].  Feeling well.   A little Nausea  On and off Appetite  No vomiting  No pelvic pain, vaginal bleeding, discharge  No other concerns      Taking PNV: yes. Taking iron here and there    Labs/imaging reviewed  Discussed screening for aneuploidy and neural tube defects, carrier screening SMA, fragile X, thalassemia, CF, etc.  Would like Quad screen next visit.      Preformed physical exam : see flow sheet  Reviewed ANNALISA, past medical history, past surgical history, family history, genetic history, and previous obstetrical history.   Encouraged good nutrition, well balanced diet, regular activity.  Discussed foods to avoid.  And other applicable safety/health risks in pregnancy.    Pa was seen today for prenatal care.    Diagnoses and all orders for this visit:    Encounter for supervision of normal pregnancy in multigravida in first trimester  -     Urinalysis, OB Screen; Future  -     Urinalysis, OB Screen  -     Chlamydia & Gonorrhea by PCR, GICH/Range - Clinic Collect    Screening for cervical cancer  -     Pap Screen reflex to HPV if ASCUS - recommended age 25 - 29 years; Future        Preeclampsia risk factors:  High risk factors (1+): none  Moderate risk factors (2+): none      Seble Isaac,

## 2022-07-07 LAB
C TRACH DNA SPEC QL PROBE+SIG AMP: NEGATIVE
N GONORRHOEA DNA SPEC QL NAA+PROBE: NEGATIVE

## 2022-07-10 LAB
BKR LAB AP GYN ADEQUACY: NORMAL
BKR LAB AP GYN INTERPRETATION: NORMAL
BKR LAB AP HPV REFLEX: NORMAL
BKR LAB AP LMP: NORMAL
BKR LAB AP PREVIOUS ABNORMAL: NORMAL
PATH REPORT.COMMENTS IMP SPEC: NORMAL
PATH REPORT.COMMENTS IMP SPEC: NORMAL
PATH REPORT.RELEVANT HX SPEC: NORMAL

## 2022-08-10 ENCOUNTER — PRENATAL OFFICE VISIT (OUTPATIENT)
Dept: FAMILY MEDICINE | Facility: CLINIC | Age: 27
End: 2022-08-10
Payer: COMMERCIAL

## 2022-08-10 VITALS
HEART RATE: 64 BPM | RESPIRATION RATE: 18 BRPM | BODY MASS INDEX: 23.09 KG/M2 | DIASTOLIC BLOOD PRESSURE: 62 MMHG | SYSTOLIC BLOOD PRESSURE: 94 MMHG | TEMPERATURE: 98.3 F | WEIGHT: 113 LBS

## 2022-08-10 DIAGNOSIS — Z34.82 ENCOUNTER FOR SUPERVISION OF OTHER NORMAL PREGNANCY, SECOND TRIMESTER: Primary | ICD-10-CM

## 2022-08-10 PROBLEM — Z3A.09 9 WEEKS GESTATION OF PREGNANCY: Status: RESOLVED | Noted: 2022-06-08 | Resolved: 2022-08-10

## 2022-08-10 PROCEDURE — 99207 PR PRENATAL VISIT: CPT | Performed by: FAMILY MEDICINE

## 2022-08-10 NOTE — PROGRESS NOTES
No ctx, lof, bleeding, or discharge.  No HA or vision changes.  Good FM : not sure.     Labs/imaging reviewed: does not want quad screen    Exam:   See flowsheet  GEN:  26 year old female sitting comfortably in no apparent distress.   HEENT:  no scleral icterus, buccal mucosa moist  CHEST/LUNG: No respiratory distress, unlabored breathing  ABD:  Soft, non-tender, Gravid uterus  PSYCH:  Mood and affect appropriate    Pa was seen today for prenatal care.    Diagnoses and all orders for this visit:    Encounter for supervision of other normal pregnancy, second trimester  -     US OB > 14 Weeks; Future    Anatomy ultrasound ordered.  Follow-up 4 weeks.  Continues on iron.

## 2022-08-15 ENCOUNTER — HOSPITAL ENCOUNTER (OUTPATIENT)
Dept: ULTRASOUND IMAGING | Facility: HOSPITAL | Age: 27
Discharge: HOME | End: 2022-08-15
Attending: FAMILY MEDICINE | Admitting: FAMILY MEDICINE
Payer: COMMERCIAL

## 2022-08-15 DIAGNOSIS — Z34.82 ENCOUNTER FOR SUPERVISION OF OTHER NORMAL PREGNANCY, SECOND TRIMESTER: ICD-10-CM

## 2022-08-15 PROCEDURE — 76805 OB US >/= 14 WKS SNGL FETUS: CPT

## 2022-09-07 ENCOUNTER — PRENATAL OFFICE VISIT (OUTPATIENT)
Dept: FAMILY MEDICINE | Facility: CLINIC | Age: 27
End: 2022-09-07
Payer: COMMERCIAL

## 2022-09-07 VITALS
WEIGHT: 119.4 LBS | SYSTOLIC BLOOD PRESSURE: 90 MMHG | TEMPERATURE: 97.7 F | DIASTOLIC BLOOD PRESSURE: 58 MMHG | HEART RATE: 69 BPM | BODY MASS INDEX: 24.4 KG/M2 | RESPIRATION RATE: 16 BRPM

## 2022-09-07 DIAGNOSIS — Z34.82 ENCOUNTER FOR SUPERVISION OF OTHER NORMAL PREGNANCY, SECOND TRIMESTER: Primary | ICD-10-CM

## 2022-09-07 PROCEDURE — 99207 PR PRENATAL VISIT: CPT | Performed by: FAMILY MEDICINE

## 2022-09-07 PROCEDURE — 81003 URINALYSIS AUTO W/O SCOPE: CPT | Performed by: FAMILY MEDICINE

## 2022-09-07 NOTE — PROGRESS NOTES
No ctx, lof, bleeding, or discharge.  No HA or vision changes.  Good FM : yes  No questions or concerns.  Feeling well.    Needs follow-up heart views    Exam:   See flowsheet  GEN:  27 year old female sitting comfortably in no apparent distress.   HEENT:  no scleral icterus, buccal mucosa moist  CHEST/LUNG: No respiratory distress, unlabored breathing  ABD:  Soft, non-tender, Gravid uterus  PSYCH:  Mood and affect appropriate    Pa was seen today for prenatal care.    Diagnoses and all orders for this visit:    Encounter for supervision of other normal pregnancy, second trimester  -     Urinalysis, OB Screen; Future  -     US OB > 14 Weeks; Future  -     Urinalysis, OB Screen      Follow-up 4 weeks with Dr. Carvajal

## 2022-09-19 ENCOUNTER — HOSPITAL ENCOUNTER (OUTPATIENT)
Dept: ULTRASOUND IMAGING | Facility: HOSPITAL | Age: 27
Discharge: HOME | End: 2022-09-19
Attending: FAMILY MEDICINE | Admitting: FAMILY MEDICINE
Payer: COMMERCIAL

## 2022-09-19 DIAGNOSIS — Z34.82 ENCOUNTER FOR SUPERVISION OF OTHER NORMAL PREGNANCY, SECOND TRIMESTER: ICD-10-CM

## 2022-09-19 PROCEDURE — 76816 OB US FOLLOW-UP PER FETUS: CPT

## 2022-10-07 ENCOUNTER — PRENATAL OFFICE VISIT (OUTPATIENT)
Dept: FAMILY MEDICINE | Facility: CLINIC | Age: 27
End: 2022-10-07
Payer: COMMERCIAL

## 2022-10-07 VITALS
DIASTOLIC BLOOD PRESSURE: 58 MMHG | BODY MASS INDEX: 24.72 KG/M2 | WEIGHT: 121 LBS | RESPIRATION RATE: 20 BRPM | HEART RATE: 66 BPM | OXYGEN SATURATION: 100 % | SYSTOLIC BLOOD PRESSURE: 92 MMHG

## 2022-10-07 DIAGNOSIS — Z34.90 ENCOUNTER FOR SUPERVISION OF NORMAL PREGNANCY, ANTEPARTUM, UNSPECIFIED GRAVIDITY: Primary | ICD-10-CM

## 2022-10-07 LAB
ALBUMIN UR-MCNC: NEGATIVE MG/DL
GLUCOSE 1H P 50 G GLC PO SERPL-MCNC: 145 MG/DL (ref 70–129)
GLUCOSE UR STRIP-MCNC: NEGATIVE MG/DL
HGB BLD-MCNC: 8.8 G/DL (ref 11.7–15.7)
KETONES UR STRIP-MCNC: NEGATIVE MG/DL

## 2022-10-07 PROCEDURE — 81003 URINALYSIS AUTO W/O SCOPE: CPT | Performed by: FAMILY MEDICINE

## 2022-10-07 PROCEDURE — 99207 PR PRENATAL VISIT: CPT | Performed by: FAMILY MEDICINE

## 2022-10-07 PROCEDURE — 86780 TREPONEMA PALLIDUM: CPT | Performed by: FAMILY MEDICINE

## 2022-10-07 PROCEDURE — 36415 COLL VENOUS BLD VENIPUNCTURE: CPT | Performed by: FAMILY MEDICINE

## 2022-10-07 PROCEDURE — 82950 GLUCOSE TEST: CPT | Performed by: FAMILY MEDICINE

## 2022-10-07 NOTE — PROGRESS NOTES
Feeling well. No ctx, lof, bleeding. +FM.   One hour today.   Needs three hour.   Not taking iron as much, doesn't feel like it is working.   Will follow hemoglobin.   Follow up in three weeks.

## 2022-10-08 LAB — T PALLIDUM AB SER QL: NONREACTIVE

## 2022-10-14 ENCOUNTER — TELEPHONE (OUTPATIENT)
Dept: FAMILY MEDICINE | Facility: CLINIC | Age: 27
End: 2022-10-14

## 2022-10-14 DIAGNOSIS — D50.8 IRON DEFICIENCY ANEMIA SECONDARY TO INADEQUATE DIETARY IRON INTAKE: Primary | ICD-10-CM

## 2022-10-14 RX ORDER — FERROUS GLUCONATE 324(38)MG
324 TABLET ORAL 2 TIMES DAILY WITH MEALS
Qty: 60 TABLET | Refills: 3 | Status: SHIPPED | OUTPATIENT
Start: 2022-10-14 | End: 2023-07-25

## 2022-10-14 NOTE — TELEPHONE ENCOUNTER
Writer spoke to pt regarding Dr. Carvajal's message below. Provider message relay to pt.     Lab only appt made for: 10/17/2022 @ 7:30 PM    Pt is needing a prescription for iron pills.    Patient Pharmacy:  [facility] - [place], [place] - [address]    Routing message back to Dr. Carvajal to write RX for iron pill.    OSBALDO Diaz, RN   [facility]    ----- Message -----  From: Jose J Carvajal MD  Sent: 10/13/2022   1:58 PM CDT  To: [facility] Care Team Pool    Please let patient know her glucose test was a little high. Needs the three hour test, fasting please. Orders placed.   Also the red blood cell count is still pretty low, please take iron. Will recheck later.

## 2022-10-14 NOTE — TELEPHONE ENCOUNTER
Writer spoke to pt regarding message below.     Pt verbalizes understanding, agrees with plan and has no further questions.    Closing encounter.    THEODORE DiazN, RN   [facility]

## 2022-10-17 ENCOUNTER — LAB (OUTPATIENT)
Dept: LAB | Facility: CLINIC | Age: 27
End: 2022-10-17
Payer: COMMERCIAL

## 2022-10-17 DIAGNOSIS — Z34.90 ENCOUNTER FOR SUPERVISION OF NORMAL PREGNANCY, ANTEPARTUM, UNSPECIFIED GRAVIDITY: ICD-10-CM

## 2022-10-17 LAB
GESTATIONAL GTT 1 HR POST DOSE: 146 MG/DL (ref 60–179)
GESTATIONAL GTT 2 HR POST DOSE: 93 MG/DL (ref 60–154)
GESTATIONAL GTT 3 HR POST DOSE: 76 MG/DL (ref 60–139)
GLUCOSE P FAST SERPL-MCNC: 73 MG/DL (ref 60–94)

## 2022-10-17 PROCEDURE — 82951 GLUCOSE TOLERANCE TEST (GTT): CPT

## 2022-10-17 PROCEDURE — 36415 COLL VENOUS BLD VENIPUNCTURE: CPT

## 2022-10-17 PROCEDURE — 82952 GTT-ADDED SAMPLES: CPT

## 2022-11-11 ENCOUNTER — PRENATAL OFFICE VISIT (OUTPATIENT)
Dept: FAMILY MEDICINE | Facility: CLINIC | Age: 27
End: 2022-11-11
Payer: COMMERCIAL

## 2022-11-11 VITALS
OXYGEN SATURATION: 98 % | RESPIRATION RATE: 16 BRPM | TEMPERATURE: 97.7 F | BODY MASS INDEX: 25.34 KG/M2 | HEART RATE: 66 BPM | SYSTOLIC BLOOD PRESSURE: 95 MMHG | WEIGHT: 124 LBS | DIASTOLIC BLOOD PRESSURE: 51 MMHG

## 2022-11-11 DIAGNOSIS — Z34.90 ENCOUNTER FOR SUPERVISION OF NORMAL PREGNANCY, ANTEPARTUM, UNSPECIFIED GRAVIDITY: Primary | ICD-10-CM

## 2022-11-11 PROCEDURE — 99207 PR PRENATAL VISIT: CPT | Performed by: FAMILY MEDICINE

## 2022-11-11 PROCEDURE — 90715 TDAP VACCINE 7 YRS/> IM: CPT | Performed by: FAMILY MEDICINE

## 2022-11-11 PROCEDURE — 90471 IMMUNIZATION ADMIN: CPT | Performed by: FAMILY MEDICINE

## 2022-11-25 ENCOUNTER — PRENATAL OFFICE VISIT (OUTPATIENT)
Dept: FAMILY MEDICINE | Facility: CLINIC | Age: 27
End: 2022-11-25
Payer: COMMERCIAL

## 2022-11-25 ENCOUNTER — TELEPHONE (OUTPATIENT)
Dept: FAMILY MEDICINE | Facility: CLINIC | Age: 27
End: 2022-11-25

## 2022-11-25 VITALS
HEART RATE: 78 BPM | OXYGEN SATURATION: 97 % | TEMPERATURE: 97.7 F | HEIGHT: 57 IN | RESPIRATION RATE: 16 BRPM | SYSTOLIC BLOOD PRESSURE: 97 MMHG | DIASTOLIC BLOOD PRESSURE: 61 MMHG | WEIGHT: 128 LBS | BODY MASS INDEX: 27.61 KG/M2

## 2022-11-25 DIAGNOSIS — D50.8 IRON DEFICIENCY ANEMIA SECONDARY TO INADEQUATE DIETARY IRON INTAKE: ICD-10-CM

## 2022-11-25 DIAGNOSIS — Z34.90 ENCOUNTER FOR SUPERVISION OF NORMAL PREGNANCY, ANTEPARTUM, UNSPECIFIED GRAVIDITY: Primary | ICD-10-CM

## 2022-11-25 LAB
ERYTHROCYTE [DISTWIDTH] IN BLOOD BY AUTOMATED COUNT: 17.2 % (ref 10–15)
HCT VFR BLD AUTO: 27.3 % (ref 35–47)
HGB BLD-MCNC: 8.3 G/DL (ref 11.7–15.7)
MCH RBC QN AUTO: 25.6 PG (ref 26.5–33)
MCHC RBC AUTO-ENTMCNC: 30.4 G/DL (ref 31.5–36.5)
MCV RBC AUTO: 84 FL (ref 78–100)
PLATELET # BLD AUTO: 254 10E3/UL (ref 150–450)
RBC # BLD AUTO: 3.24 10E6/UL (ref 3.8–5.2)
WBC # BLD AUTO: 9 10E3/UL (ref 4–11)

## 2022-11-25 PROCEDURE — 99207 PR PRENATAL VISIT: CPT | Performed by: FAMILY MEDICINE

## 2022-11-25 PROCEDURE — 36415 COLL VENOUS BLD VENIPUNCTURE: CPT | Performed by: FAMILY MEDICINE

## 2022-11-25 PROCEDURE — 85027 COMPLETE CBC AUTOMATED: CPT | Performed by: FAMILY MEDICINE

## 2022-11-25 RX ORDER — MULTIVIT WITH MINERALS/LUTEIN
250 TABLET ORAL 2 TIMES DAILY
Qty: 60 TABLET | Refills: 3 | Status: SHIPPED | OUTPATIENT
Start: 2022-11-25 | End: 2023-07-25

## 2022-11-25 NOTE — TELEPHONE ENCOUNTER
----- Message from Jose J Carvajal MD sent at 11/25/2022  3:13 PM CST -----  Please call. Can she please try to take her iron supplement more regularly, if already taking once per day bump up to 2 times per day also I am adding vitamin C, take with the iron supplement.

## 2022-11-25 NOTE — PROGRESS NOTES
Feeling well except has some pelvic pressure when walking. Working from home mostly.   No ctx, lof bleeding. +FM.   Recheck hb today.   Follow up in two weeks for GBS.

## 2022-12-09 ENCOUNTER — PRENATAL OFFICE VISIT (OUTPATIENT)
Dept: FAMILY MEDICINE | Facility: CLINIC | Age: 27
End: 2022-12-09
Payer: COMMERCIAL

## 2022-12-09 VITALS
SYSTOLIC BLOOD PRESSURE: 102 MMHG | DIASTOLIC BLOOD PRESSURE: 64 MMHG | HEART RATE: 71 BPM | TEMPERATURE: 97.7 F | BODY MASS INDEX: 28.35 KG/M2 | WEIGHT: 131 LBS

## 2022-12-09 DIAGNOSIS — Z34.90 ENCOUNTER FOR SUPERVISION OF NORMAL PREGNANCY, ANTEPARTUM, UNSPECIFIED GRAVIDITY: Primary | ICD-10-CM

## 2022-12-09 DIAGNOSIS — D50.8 IRON DEFICIENCY ANEMIA SECONDARY TO INADEQUATE DIETARY IRON INTAKE: ICD-10-CM

## 2022-12-09 PROCEDURE — 99207 PR PRENATAL VISIT: CPT | Performed by: FAMILY MEDICINE

## 2022-12-09 PROCEDURE — 87653 STREP B DNA AMP PROBE: CPT | Performed by: FAMILY MEDICINE

## 2022-12-10 LAB — GP B STREP DNA SPEC QL NAA+PROBE: NEGATIVE

## 2022-12-16 ENCOUNTER — PRENATAL OFFICE VISIT (OUTPATIENT)
Dept: FAMILY MEDICINE | Facility: CLINIC | Age: 27
End: 2022-12-16
Payer: COMMERCIAL

## 2022-12-16 VITALS
SYSTOLIC BLOOD PRESSURE: 92 MMHG | HEART RATE: 94 BPM | TEMPERATURE: 97.6 F | BODY MASS INDEX: 29.54 KG/M2 | DIASTOLIC BLOOD PRESSURE: 56 MMHG | WEIGHT: 136.5 LBS | OXYGEN SATURATION: 100 % | RESPIRATION RATE: 20 BRPM

## 2022-12-16 DIAGNOSIS — Z34.90 ENCOUNTER FOR SUPERVISION OF NORMAL PREGNANCY, ANTEPARTUM, UNSPECIFIED GRAVIDITY: Primary | ICD-10-CM

## 2022-12-16 PROCEDURE — 99207 PR PRENATAL VISIT: CPT | Performed by: FAMILY MEDICINE

## 2022-12-16 NOTE — PROGRESS NOTES
Some ramandeep marks. No lof. No bleeding. +FM.   CE 1cm, very soft, mid.   Labor precautions.   Follow up in one week.

## 2022-12-23 ENCOUNTER — PRENATAL OFFICE VISIT (OUTPATIENT)
Dept: FAMILY MEDICINE | Facility: CLINIC | Age: 27
End: 2022-12-23
Payer: COMMERCIAL

## 2022-12-23 VITALS
HEART RATE: 71 BPM | OXYGEN SATURATION: 100 % | SYSTOLIC BLOOD PRESSURE: 104 MMHG | WEIGHT: 133 LBS | BODY MASS INDEX: 28.78 KG/M2 | RESPIRATION RATE: 21 BRPM | DIASTOLIC BLOOD PRESSURE: 64 MMHG

## 2022-12-23 DIAGNOSIS — Z34.90 ENCOUNTER FOR SUPERVISION OF NORMAL PREGNANCY, ANTEPARTUM, UNSPECIFIED GRAVIDITY: Primary | ICD-10-CM

## 2022-12-23 LAB
ALBUMIN UR-MCNC: NEGATIVE MG/DL
GLUCOSE UR STRIP-MCNC: NEGATIVE MG/DL
HGB BLD-MCNC: 8.2 G/DL (ref 11.7–15.7)
KETONES UR STRIP-MCNC: NEGATIVE MG/DL

## 2022-12-23 PROCEDURE — 99207 PR PRENATAL VISIT: CPT | Performed by: FAMILY MEDICINE

## 2022-12-23 PROCEDURE — 85018 HEMOGLOBIN: CPT | Performed by: FAMILY MEDICINE

## 2022-12-23 PROCEDURE — 36415 COLL VENOUS BLD VENIPUNCTURE: CPT | Performed by: FAMILY MEDICINE

## 2022-12-23 PROCEDURE — 81003 URINALYSIS AUTO W/O SCOPE: CPT | Performed by: FAMILY MEDICINE

## 2022-12-26 ENCOUNTER — HEALTH MAINTENANCE LETTER (OUTPATIENT)
Age: 27
End: 2022-12-26

## 2022-12-27 ENCOUNTER — HOSPITAL ENCOUNTER (INPATIENT)
Facility: HOSPITAL | Age: 27
LOS: 2 days | Discharge: HOME | End: 2022-12-29
Attending: FAMILY MEDICINE | Admitting: FAMILY MEDICINE
Payer: COMMERCIAL

## 2022-12-27 DIAGNOSIS — D50.9 IRON DEFICIENCY ANEMIA, UNSPECIFIED IRON DEFICIENCY ANEMIA TYPE: ICD-10-CM

## 2022-12-27 DIAGNOSIS — O42.90 DELAYED DELIVERY AFTER SROM (SPONTANEOUS RUPTURE OF MEMBRANES): Primary | ICD-10-CM

## 2022-12-27 PROBLEM — Z34.90 PREGNANT: Status: ACTIVE | Noted: 2022-12-27

## 2022-12-27 LAB
ABO/RH(D): NORMAL
ANTIBODY SCREEN: NEGATIVE
HOLD SPECIMEN: NORMAL
SPECIMEN EXPIRATION DATE: NORMAL
T PALLIDUM AB SER QL: NONREACTIVE

## 2022-12-27 PROCEDURE — 120N000001 HC R&B MED SURG/OB

## 2022-12-27 PROCEDURE — 258N000003 HC RX IP 258 OP 636: Performed by: FAMILY MEDICINE

## 2022-12-27 PROCEDURE — 59400 OBSTETRICAL CARE: CPT | Performed by: FAMILY MEDICINE

## 2022-12-27 PROCEDURE — 250N000011 HC RX IP 250 OP 636: Performed by: FAMILY MEDICINE

## 2022-12-27 PROCEDURE — 250N000009 HC RX 250: Performed by: FAMILY MEDICINE

## 2022-12-27 PROCEDURE — 250N000013 HC RX MED GY IP 250 OP 250 PS 637: Performed by: FAMILY MEDICINE

## 2022-12-27 PROCEDURE — 36415 COLL VENOUS BLD VENIPUNCTURE: CPT | Performed by: FAMILY MEDICINE

## 2022-12-27 PROCEDURE — 3E033VJ INTRODUCTION OF OTHER HORMONE INTO PERIPHERAL VEIN, PERCUTANEOUS APPROACH: ICD-10-PCS | Performed by: FAMILY MEDICINE

## 2022-12-27 PROCEDURE — 86901 BLOOD TYPING SEROLOGIC RH(D): CPT | Performed by: FAMILY MEDICINE

## 2022-12-27 PROCEDURE — 722N000001 HC LABOR CARE VAGINAL DELIVERY SINGLE

## 2022-12-27 PROCEDURE — 86780 TREPONEMA PALLIDUM: CPT | Performed by: FAMILY MEDICINE

## 2022-12-27 RX ORDER — PRENATAL VIT/IRON FUM/FOLIC AC 27MG-0.8MG
1 TABLET ORAL DAILY
Status: DISCONTINUED | OUTPATIENT
Start: 2022-12-27 | End: 2022-12-29 | Stop reason: HOSPADM

## 2022-12-27 RX ORDER — CARBOPROST TROMETHAMINE 250 UG/ML
250 INJECTION, SOLUTION INTRAMUSCULAR
Status: DISCONTINUED | OUTPATIENT
Start: 2022-12-27 | End: 2022-12-29 | Stop reason: HOSPADM

## 2022-12-27 RX ORDER — IBUPROFEN 800 MG/1
800 TABLET, FILM COATED ORAL EVERY 6 HOURS PRN
Status: DISCONTINUED | OUTPATIENT
Start: 2022-12-27 | End: 2022-12-29 | Stop reason: HOSPADM

## 2022-12-27 RX ORDER — SODIUM CHLORIDE, SODIUM LACTATE, POTASSIUM CHLORIDE, CALCIUM CHLORIDE 600; 310; 30; 20 MG/100ML; MG/100ML; MG/100ML; MG/100ML
INJECTION, SOLUTION INTRAVENOUS CONTINUOUS PRN
Status: DISCONTINUED | OUTPATIENT
Start: 2022-12-27 | End: 2022-12-27 | Stop reason: HOSPADM

## 2022-12-27 RX ORDER — NALOXONE HYDROCHLORIDE 0.4 MG/ML
0.4 INJECTION, SOLUTION INTRAMUSCULAR; INTRAVENOUS; SUBCUTANEOUS
Status: DISCONTINUED | OUTPATIENT
Start: 2022-12-27 | End: 2022-12-27 | Stop reason: HOSPADM

## 2022-12-27 RX ORDER — NALOXONE HYDROCHLORIDE 0.4 MG/ML
0.2 INJECTION, SOLUTION INTRAMUSCULAR; INTRAVENOUS; SUBCUTANEOUS
Status: DISCONTINUED | OUTPATIENT
Start: 2022-12-27 | End: 2022-12-27 | Stop reason: HOSPADM

## 2022-12-27 RX ORDER — METHYLERGONOVINE MALEATE 0.2 MG/ML
200 INJECTION INTRAVENOUS
Status: DISCONTINUED | OUTPATIENT
Start: 2022-12-27 | End: 2022-12-29 | Stop reason: HOSPADM

## 2022-12-27 RX ORDER — KETOROLAC TROMETHAMINE 30 MG/ML
30 INJECTION, SOLUTION INTRAMUSCULAR; INTRAVENOUS
Status: DISCONTINUED | OUTPATIENT
Start: 2022-12-27 | End: 2022-12-29 | Stop reason: HOSPADM

## 2022-12-27 RX ORDER — ACETAMINOPHEN 325 MG/1
650 TABLET ORAL EVERY 4 HOURS PRN
Status: DISCONTINUED | OUTPATIENT
Start: 2022-12-27 | End: 2022-12-29 | Stop reason: HOSPADM

## 2022-12-27 RX ORDER — FERROUS GLUCONATE 324(38)MG
324 TABLET ORAL 2 TIMES DAILY WITH MEALS
Status: DISCONTINUED | OUTPATIENT
Start: 2022-12-27 | End: 2022-12-29 | Stop reason: HOSPADM

## 2022-12-27 RX ORDER — MISOPROSTOL 200 UG/1
400 TABLET ORAL
Status: DISCONTINUED | OUTPATIENT
Start: 2022-12-27 | End: 2022-12-29 | Stop reason: HOSPADM

## 2022-12-27 RX ORDER — CARBOPROST TROMETHAMINE 250 UG/ML
250 INJECTION, SOLUTION INTRAMUSCULAR
Status: DISCONTINUED | OUTPATIENT
Start: 2022-12-27 | End: 2022-12-27 | Stop reason: HOSPADM

## 2022-12-27 RX ORDER — METOCLOPRAMIDE 10 MG/1
10 TABLET ORAL EVERY 6 HOURS PRN
Status: DISCONTINUED | OUTPATIENT
Start: 2022-12-27 | End: 2022-12-27 | Stop reason: HOSPADM

## 2022-12-27 RX ORDER — PROCHLORPERAZINE 25 MG
25 SUPPOSITORY, RECTAL RECTAL EVERY 12 HOURS PRN
Status: DISCONTINUED | OUTPATIENT
Start: 2022-12-27 | End: 2022-12-27 | Stop reason: HOSPADM

## 2022-12-27 RX ORDER — MISOPROSTOL 200 UG/1
400 TABLET ORAL
Status: DISCONTINUED | OUTPATIENT
Start: 2022-12-27 | End: 2022-12-27 | Stop reason: HOSPADM

## 2022-12-27 RX ORDER — LIDOCAINE 40 MG/G
CREAM TOPICAL
Status: DISCONTINUED | OUTPATIENT
Start: 2022-12-27 | End: 2022-12-27 | Stop reason: HOSPADM

## 2022-12-27 RX ORDER — OXYTOCIN/0.9 % SODIUM CHLORIDE 30/500 ML
340 PLASTIC BAG, INJECTION (ML) INTRAVENOUS CONTINUOUS PRN
Status: DISCONTINUED | OUTPATIENT
Start: 2022-12-27 | End: 2022-12-27 | Stop reason: HOSPADM

## 2022-12-27 RX ORDER — MISOPROSTOL 200 UG/1
800 TABLET ORAL
Status: DISCONTINUED | OUTPATIENT
Start: 2022-12-27 | End: 2022-12-29 | Stop reason: HOSPADM

## 2022-12-27 RX ORDER — PROCHLORPERAZINE MALEATE 10 MG
10 TABLET ORAL EVERY 6 HOURS PRN
Status: DISCONTINUED | OUTPATIENT
Start: 2022-12-27 | End: 2022-12-27 | Stop reason: HOSPADM

## 2022-12-27 RX ORDER — IBUPROFEN 800 MG/1
800 TABLET, FILM COATED ORAL
Status: DISCONTINUED | OUTPATIENT
Start: 2022-12-27 | End: 2022-12-29 | Stop reason: HOSPADM

## 2022-12-27 RX ORDER — MULTIVIT WITH MINERALS/LUTEIN
250 TABLET ORAL 2 TIMES DAILY WITH MEALS
Status: DISCONTINUED | OUTPATIENT
Start: 2022-12-27 | End: 2022-12-28 | Stop reason: CLARIF

## 2022-12-27 RX ORDER — OXYTOCIN 10 [USP'U]/ML
10 INJECTION, SOLUTION INTRAMUSCULAR; INTRAVENOUS
Status: DISCONTINUED | OUTPATIENT
Start: 2022-12-27 | End: 2022-12-29 | Stop reason: HOSPADM

## 2022-12-27 RX ORDER — OXYTOCIN/0.9 % SODIUM CHLORIDE 30/500 ML
1-24 PLASTIC BAG, INJECTION (ML) INTRAVENOUS CONTINUOUS
Status: DISCONTINUED | OUTPATIENT
Start: 2022-12-27 | End: 2022-12-27 | Stop reason: HOSPADM

## 2022-12-27 RX ORDER — ONDANSETRON 4 MG/1
4 TABLET, ORALLY DISINTEGRATING ORAL EVERY 6 HOURS PRN
Status: DISCONTINUED | OUTPATIENT
Start: 2022-12-27 | End: 2022-12-27 | Stop reason: HOSPADM

## 2022-12-27 RX ORDER — OXYTOCIN 10 [USP'U]/ML
10 INJECTION, SOLUTION INTRAMUSCULAR; INTRAVENOUS
Status: DISCONTINUED | OUTPATIENT
Start: 2022-12-27 | End: 2022-12-27 | Stop reason: HOSPADM

## 2022-12-27 RX ORDER — DOCUSATE SODIUM 100 MG/1
100 CAPSULE, LIQUID FILLED ORAL DAILY
Status: DISCONTINUED | OUTPATIENT
Start: 2022-12-27 | End: 2022-12-29 | Stop reason: HOSPADM

## 2022-12-27 RX ORDER — MODIFIED LANOLIN
OINTMENT (GRAM) TOPICAL
Status: DISCONTINUED | OUTPATIENT
Start: 2022-12-27 | End: 2022-12-29 | Stop reason: HOSPADM

## 2022-12-27 RX ORDER — BISACODYL 10 MG
10 SUPPOSITORY, RECTAL RECTAL DAILY PRN
Status: DISCONTINUED | OUTPATIENT
Start: 2022-12-27 | End: 2022-12-29 | Stop reason: HOSPADM

## 2022-12-27 RX ORDER — METOCLOPRAMIDE HYDROCHLORIDE 5 MG/ML
10 INJECTION INTRAMUSCULAR; INTRAVENOUS EVERY 6 HOURS PRN
Status: DISCONTINUED | OUTPATIENT
Start: 2022-12-27 | End: 2022-12-27 | Stop reason: HOSPADM

## 2022-12-27 RX ORDER — OXYTOCIN/0.9 % SODIUM CHLORIDE 30/500 ML
340 PLASTIC BAG, INJECTION (ML) INTRAVENOUS CONTINUOUS PRN
Status: DISCONTINUED | OUTPATIENT
Start: 2022-12-27 | End: 2022-12-29 | Stop reason: HOSPADM

## 2022-12-27 RX ORDER — HYDROCORTISONE 25 MG/G
CREAM TOPICAL 3 TIMES DAILY PRN
Status: DISCONTINUED | OUTPATIENT
Start: 2022-12-27 | End: 2022-12-29 | Stop reason: HOSPADM

## 2022-12-27 RX ORDER — ONDANSETRON 2 MG/ML
4 INJECTION INTRAMUSCULAR; INTRAVENOUS EVERY 6 HOURS PRN
Status: DISCONTINUED | OUTPATIENT
Start: 2022-12-27 | End: 2022-12-27 | Stop reason: HOSPADM

## 2022-12-27 RX ORDER — MISOPROSTOL 200 UG/1
800 TABLET ORAL
Status: DISCONTINUED | OUTPATIENT
Start: 2022-12-27 | End: 2022-12-27 | Stop reason: HOSPADM

## 2022-12-27 RX ORDER — METHYLERGONOVINE MALEATE 0.2 MG/ML
200 INJECTION INTRAVENOUS
Status: DISCONTINUED | OUTPATIENT
Start: 2022-12-27 | End: 2022-12-27 | Stop reason: HOSPADM

## 2022-12-27 RX ORDER — OXYTOCIN/0.9 % SODIUM CHLORIDE 30/500 ML
100-340 PLASTIC BAG, INJECTION (ML) INTRAVENOUS CONTINUOUS PRN
Status: DISCONTINUED | OUTPATIENT
Start: 2022-12-27 | End: 2022-12-29 | Stop reason: HOSPADM

## 2022-12-27 RX ORDER — CITRIC ACID/SODIUM CITRATE 334-500MG
30 SOLUTION, ORAL ORAL
Status: DISCONTINUED | OUTPATIENT
Start: 2022-12-27 | End: 2022-12-27 | Stop reason: HOSPADM

## 2022-12-27 RX ADMIN — Medication 2 MILLI-UNITS/MIN: at 13:48

## 2022-12-27 RX ADMIN — KETOROLAC TROMETHAMINE 30 MG: 30 INJECTION, SOLUTION INTRAMUSCULAR; INTRAVENOUS at 19:18

## 2022-12-27 RX ADMIN — PRENATAL VIT W/ FE FUMARATE-FA TAB 27-0.8 MG 1 TABLET: 27-0.8 TAB at 21:10

## 2022-12-27 RX ADMIN — SODIUM CHLORIDE, POTASSIUM CHLORIDE, SODIUM LACTATE AND CALCIUM CHLORIDE: 600; 310; 30; 20 INJECTION, SOLUTION INTRAVENOUS at 13:55

## 2022-12-27 RX ADMIN — DOCUSATE SODIUM 100 MG: 100 CAPSULE, LIQUID FILLED ORAL at 21:10

## 2022-12-27 RX ADMIN — TRANEXAMIC ACID 1 G: 1 INJECTION, SOLUTION INTRAVENOUS at 18:30

## 2022-12-27 RX ADMIN — Medication 250 MG: at 21:20

## 2022-12-27 RX ADMIN — FERROUS GLUCONATE 324 MG: 324 TABLET ORAL at 21:19

## 2022-12-27 ASSESSMENT — ACTIVITIES OF DAILY LIVING (ADL)
ADLS_ACUITY_SCORE: 18
ADLS_ACUITY_SCORE: 31
DIFFICULTY_EATING/SWALLOWING: NO
WALKING_OR_CLIMBING_STAIRS_DIFFICULTY: NO
DRESSING/BATHING_DIFFICULTY: NO
ADLS_ACUITY_SCORE: 18
ADLS_ACUITY_SCORE: 18
TOILETING_ISSUES: NO
ADLS_ACUITY_SCORE: 18
ADLS_ACUITY_SCORE: 18
FALL_HISTORY_WITHIN_LAST_SIX_MONTHS: NO
CONCENTRATING,_REMEMBERING_OR_MAKING_DECISIONS_DIFFICULTY: NO
DOING_ERRANDS_INDEPENDENTLY_DIFFICULTY: NO
WEAR_GLASSES_OR_BLIND: NO
ADLS_ACUITY_SCORE: 18
ADLS_ACUITY_SCORE: 18
CHANGE_IN_FUNCTIONAL_STATUS_SINCE_ONSET_OF_CURRENT_ILLNESS/INJURY: NO

## 2022-12-27 NOTE — PROGRESS NOTES
Dr. Carvajal called unit for update. Plan of care is to continue letting patient labor on her own. If delivery hasn't happened by 12 plan to start pitocin.

## 2022-12-27 NOTE — PLAN OF CARE
Patient faustino every 2-3 minutes. Patient reports feeling the contractions but states that they are not painful.    Problem: Labor  Goal: Effective Progression to Delivery  Outcome: Progressing

## 2022-12-27 NOTE — PLAN OF CARE
Patient faustino regularly. States mild contractions. Continues to rest.     Problem: Labor  Goal: Stable Fetal Wellbeing  Outcome: Progressing     Problem: Labor  Goal: Normal Uterine Contraction Pattern  Outcome: Progressing

## 2022-12-27 NOTE — PROGRESS NOTES
Patient arrived to unit reporting rupture of membrates at 0221. Patient denies headache, change in vision, nausea or bleeding. Patient brought to room 5. Grossly ruptured. Consent given to place patient on monitor. SVE performed and patient 2/70%/-3. Dr. Carvajal called and updated on patient's arrival. Plan of care is for patient to rest and see if she naturally starts laboring.

## 2022-12-27 NOTE — PROGRESS NOTES
Dr Carvajal updated on patient's status. Informed her that she has not had any cervical change since the last exam. I increased the pitocin to 8 mu. Category 1 tracing.

## 2022-12-27 NOTE — PLAN OF CARE
Problem: Labor  Goal: Normal Uterine Contraction Pattern  Outcome: Progressing   Pitocin infusing. Contractions getting stronger and more frequent. Continue to adjust pitocin as needed.

## 2022-12-27 NOTE — PROVIDER NOTIFICATION
Spoke with Dr. Carvajal via telephone.   Update on patient status, contraction pattern, FHTs.   Patient is faustino every two to four minutes, on birthing ball. She reports contractions are not painful. VSS.   Plan to initiate pitocin in 1-2 hours if labor has not progressed.   Patient verbalizes understanding and is agreeable to plan.     Will continue to monitor.     Anne Lyman RN

## 2022-12-27 NOTE — H&P
[facility] Labor and Delivery History and Physical    Artie Rapp MRN# [id number]   Age: 27 year old YOB: 1995     Date of Admission:  2022    Primary care provider: Jose J Carvajal           Chief Complaint:   Artie Rapp is a 27 year old female who is 38w5d pregnant and being admitted for induction of labor, indication premature rupture of membranes and SROM.  Admitted overnight at 3 am due to SROM at home. She was not faustino regularly and they were not painful.   Was monitored overnight and today. There is regular contractions but little cervical change in eleven hours. Agrees to pitocin induction of labor.           Pregnancy history:     OBSTETRIC HISTORY:    OB History    Para Term  AB Living   6 3 3 0 2 3   SAB IAB Ectopic Multiple Live Births   2 0 0 0 3      # Outcome Date GA Lbr Markos/2nd Weight Sex Delivery Anes PTL Lv   6 Current            5 SAB 22 10w6d    SAB         Birth Comments: Spontaneous miscarriage. Uncomplicated.   4 Term 21 39w2d 06:50 / 00:01 3.09 kg (6 lb 13 oz) F Vag-Spont None N CHRIS      Name: SONIYA,FEMALE-PA      Apgar1: 6  Apgar5: 9   3 SAB 20 11w6d    SAB         Birth Comments: missed SAB, needed D&C   2 Term 19 39w2d  2.801 kg (6 lb 2.8 oz) F Vag-Spont None N CHRIS      Name: Lance      Apgar1: 8  Apgar5: 9   1 Term 18 38w0d 02:25 / 00:05 2.28 kg (5 lb 0.4 oz) F Vag-Spont Local N CHRIS      Name: Kit      Apgar1: 8  Apgar5: 9       EDC: Estimated Date of Delivery: 23    Prenatal Labs:   Lab Results   Component Value Date    AS Negative 2022    HEPBANG Nonreactive 2022    GCPCRT Negative 2020    HGB 8.2 (L) 2022       GBS Status:   No results found for: GBS    Active Problem List  Patient Active Problem List   Diagnosis     Asymmetrical Sensorineural Hearing Loss     Scoliosis     Encounter for supervision of other normal pregnancy, second trimester     Pregnant     Delayed  delivery after SROM (spontaneous rupture of membranes)       Medication Prior to Admission  Medications Prior to Admission   Medication Sig Dispense Refill Last Dose     ferrous gluconate (FERGON) 324 (38 Fe) MG tablet Take 1 tablet (324 mg) by mouth 2 times daily (with meals) 60 tablet 3      Prenatal MV-Min-Fe Fum-FA-DHA (PRENATAL MULTIVITAMIN PLUS DHA) 27-0.8-250 MG CAPS Take 1 tablet by mouth daily OK to substitute formulary equivalent 100 capsule 3      vitamin C (ASCORBIC ACID) 250 MG tablet Take 1 tablet (250 mg) by mouth 2 times daily 60 tablet 3    .        Maternal Past Medical History:     Past Medical History:   Diagnosis Date     Anemia     borderline     Urinary tract infection      Vaginal delivery 11/30/2019    Delivered on the toilet.  Coupling noted.  arom of clear fluid.  No meds.  Received cytotec and pitocin for bleeding.       Vaginal delivery 11/12/2018    Unmedicated.  srom of clear fluid.  Delivered quickly after this.  Left labial laceration repaired.                         Family History:     Family History   Problem Relation Age of Onset     Multiple births Mother         Pa is a twin (probably fraternal)     No Known Problems Father      No Known Problems Sister      No Known Problems Sister      No Known Problems Sister      No Known Problems Brother      No Known Problems Brother      No Known Problems Brother      Autism Spectrum Disorder Nephew      Family history reviewed and updated in [facility]            Social History:     Social History     Tobacco Use     Smoking status: Never     Smokeless tobacco: Never     Tobacco comments:     no passive exposure   Substance Use Topics     Alcohol use: No            Review of Systems:   The Review of Systems is negative other than noted in the HPI          Physical Exam:   Vitals were reviewed  Temp: 98.6  F (37  C) Temp src: Oral BP: 101/58     Resp: 18   O2 Device: None (Room air)    Constitutional:   awake, alert, cooperative, no apparent  distress, and appears stated age     Abdomen:   No scars, normal bowel sounds, soft, non-distended, non-tender, no masses palpated, no hepatosplenomegally     Neurologic:   Awake, alert, oriented to name, place and time.  Cranial nerves II-XII are grossly intact.  Motor is 5 out of 5 bilaterally.  Cerebellar finger to nose, heel to shin intact.  Sensory is intact.  Babinski down going, Romberg negative, and gait is normal.     Skin:   no bruising or bleeding      Cervix:   Membranes: SROM   Dilation: 3   Effacement: 60%   Station:-2   Consistency: soft   Position: Mid  Presentation:Cephalic  Fetal Heart Rate Tracing: reactive and reassuring  Tocometer: frequency q 2-5 minutes                       Assessment:   Artie Rapp is a 38w5d pregnant female admitted with induction of labor, indication premature rupture of membranes and SROM.          Plan:   Admit - see IP orders  Labor induction with Pitocin  Pain medication as desired.   Hemoglobin without much improvement despite iron supplement. TXA at 8 cm.   Anticipate     Jose J Carvajal MD

## 2022-12-28 PROCEDURE — 120N000001 HC R&B MED SURG/OB

## 2022-12-28 PROCEDURE — 99207 PR SUBSEQUENT HOSPITAL CARE FOR MOTHER, 15 MINUTES: CPT | Performed by: STUDENT IN AN ORGANIZED HEALTH CARE EDUCATION/TRAINING PROGRAM

## 2022-12-28 PROCEDURE — 250N000013 HC RX MED GY IP 250 OP 250 PS 637: Performed by: FAMILY MEDICINE

## 2022-12-28 RX ADMIN — Medication 250 MG: at 09:35

## 2022-12-28 RX ADMIN — IBUPROFEN 800 MG: 800 TABLET ORAL at 01:19

## 2022-12-28 RX ADMIN — FERROUS GLUCONATE 324 MG: 324 TABLET ORAL at 09:35

## 2022-12-28 RX ADMIN — DOCUSATE SODIUM 100 MG: 100 CAPSULE, LIQUID FILLED ORAL at 09:35

## 2022-12-28 RX ADMIN — PRENATAL VIT W/ FE FUMARATE-FA TAB 27-0.8 MG 1 TABLET: 27-0.8 TAB at 09:35

## 2022-12-28 RX ADMIN — FERROUS GLUCONATE 324 MG: 324 TABLET ORAL at 17:37

## 2022-12-28 RX ADMIN — IBUPROFEN 800 MG: 800 TABLET ORAL at 07:51

## 2022-12-28 ASSESSMENT — ACTIVITIES OF DAILY LIVING (ADL)
ADLS_ACUITY_SCORE: 18

## 2022-12-28 NOTE — PLAN OF CARE
Problem: Plan of Care - These are the overarching goals to be used throughout the patient stay.    Goal: Optimal Comfort and Wellbeing  Outcome: Progressing  Intervention: Monitor Pain and Promote Comfort  Recent Flowsheet Documentation  Taken 12/28/2022 0845 by Renay Jay RN  Pain Management Interventions: rest  Intervention: Provide Person-Centered Care  Recent Flowsheet Documentation  Taken 12/28/2022 0930 by Renay Jay RN  Trust Relationship/Rapport:   care explained   choices provided   emotional support provided   thoughts/feelings acknowledged   reassurance provided     Patient assessments and vitals are WDL. Pain has been adequately controlled by ibuprofen. Patient is up ad bozena. Voiding without difficulty. Hbg 8.2, takes iron supplements and denies lightheadedness and dizziness. Using abdominal binder. Planning for discharge tomorrow. Continue to monitor pain.

## 2022-12-28 NOTE — PROGRESS NOTES
Maternal Postpartum Progress Note  [facility]  2022 7:52 AM     ________________________________________________________________________    Assessment:    Postpartum Day #1 s/p vaginal delivery.    Active Problems:    Normal vaginal delivery    Pregnant    Delayed delivery after SROM (spontaneous rupture of membranes)       Plan:   - Continue current care.  - Likely home tomorrow.  ________________________________________________________________________    Subjective:  Patient denies headache, dizziness, dyspnea, and leg pain.  Ambulating and eating.    Objective:  Patient Vitals for the past 24 hrs:   BP Temp Temp src Pulse Resp SpO2   22 0500 98/56 98.2  F (36.8  C) Oral 69 16 98 %   22 0122 98/47 -- -- -- -- --   22 0117 94/45 98.4  F (36.9  C) Oral 70 -- 97 %   22 102/56 -- -- -- -- --   22 110/68 -- -- -- -- --   22 106/56 -- -- -- -- --   22 102/57 -- -- -- -- --   22 111/57 -- -- -- -- --   22 107/57 -- -- -- -- --   22 104/55 -- -- -- -- --   22 110/71 98.4  F (36.9  C) Oral -- 18 --   22 1528 109/61 98.9  F (37.2  C) Oral -- 18 --   22 1425 -- 98.6  F (37  C) Oral -- -- --   22 1355 105/59 -- -- -- 16 --   22 1350 -- 98.4  F (36.9  C) Oral -- -- --   22 1125 101/58 98.6  F (37  C) Oral -- -- --     General: Alert, comfortable.  Heart: RRR, no murmur.  Lungs: CTA bilaterally.  Abdomen: non-distended. Uterine fundus firm, below umbilicus.  Ext: trace edema, no calf tenderness.    No results found for this or any previous visit (from the past 24 hour(s)).   Completed by:   Donna Turner DO, DHRUV  [facility]  2022 7:52 AM   used: Not needed.

## 2022-12-28 NOTE — PLAN OF CARE
Problem: Plan of Care - These are the overarching goals to be used throughout the patient stay.    Goal: Optimal Comfort and Wellbeing  12/28/2022 1759 by Renay Jay, RN  Outcome: Progressing  Intervention: Monitor Pain and Promote Comfort    Intervention: Provide Person-Centered Care  Recent Flowsheet Documentation  Taken 12/28/2022 1730 by Renay Jay, RN  Trust Relationship/Rapport:   care explained   choices provided   emotional support provided   thoughts/feelings acknowledged   reassurance provided     Patient assessments and vitals are WDL. Patient pain has been a steady 2/10 sore perineum and uterine cramping; pt feels well and denies pain medications. Patient is up ad bozena. Voiding without difficulty. Partner is by bedside and supports pt. Pt is attentive to infant and active in cares. Continue to monitor pain.      Called Dr. Turner around 4pm to clarify Vitamin C dosage. Stated to talk with pt on thoughts and home routine. MD ordered to discontinue med per pt request.

## 2022-12-28 NOTE — PLAN OF CARE
Problem: Postpartum (Vaginal Delivery)  Goal: Optimal Pain Control and Function  Outcome: Progressing   Pain will be 3 or less. Toradol given for pain. Educated on other options for pain relief.

## 2022-12-28 NOTE — PLAN OF CARE
Problem: Plan of Care - These are the overarching goals to be used throughout the patient stay.    Goal: Readiness for Transition of Care  Outcome: Progressing     Problem: Postpartum (Vaginal Delivery)  Goal: Hemostasis  Outcome: Progressing     Problem: Postpartum (Vaginal Delivery)  Goal: Effective Urinary Elimination  Outcome: Progressing     Pa is bonding well with her . Pa's vital signs are WNL. Bleeding is WNL. Fundus is firm and at 2 cm below. Pa is voiding with no difficulties. Pain is tolerable with PRN ibuprofen. Declines tylenol during shift.

## 2022-12-28 NOTE — L&D DELIVERY NOTE
OB Vaginal Delivery Note    Artie Rapp MRN# [id number]   Age: 27 year old YOB: 1995       GA: 38w5d  GP:   Labor Complications: None   EBL:   mL  Delivery QBL:    Delivery Type: Vaginal, Spontaneous   ROM to Delivery Time: (Delivered) Hours: 16 Minutes: 47   Weight:     1 Minute 5 Minute 10 Minute   Apgar Totals: 9   9        EMANUEL SMITH     Delivery Details:  Artie Rapp, a 27 year old  female delivered a viable infant with apgars of 9  and 9 . Patient was fully dilated and pushing after   hours   minutes in active labor. Delivery was via vaginal, spontaneous  to a sterile field under none  anesthesia. Infant delivered in vertex  left  occiput  anterior  position. Anterior and posterior shoulders delivered without difficulty. The cord was clamped, cut twice and 3 vessels  were noted. Cord blood was obtained in routine fashion with the following disposition: discard .      Cord complications: none   Placenta delivered at 2022  7:07 PM . Placental disposition was Hospital disposal . Fundal massage performed and fundus found to be firm.     Episiotomy: none    Perineum, vagina, cervix were inspected, and the following lacerations were noted:   Perineal lacerations: none                Excellent hemostasis was noted. Needle count correct. Infant and patient in delivery room in good and stable condition.        Dionte, Female-Pa [[id number]]    Labor Event Times    Labor onset date: 22 Onset time:  4:00 PM   Dilation complete date: 22 Complete time:  7:00 PM   Start pushing date/time: 2022 1901      Labor Events     labor?: No   steroids: None  Labor Type: Augmentation  Predominate monitoring during 1st stage: continuous electronic fetal monitoring     Antibiotics received during labor?: No     Rupture date/time: 22 0215   Rupture type: Spontaneous rupture of membranes occuring during spontaneous labor or augmentation  Fluid color: Clear  Fluid  odor: Normal     Induction: Oxytocin  Induction date/time:     Cervical ripening date/time:     Indications for induction: Premature ROM     Augmentation: None  Augmentation date/time: 22 1340      Delivery/Placenta Date and Time    Delivery Date: 22 Delivery Time:  7:02 PM   Placenta Date/Time: 2022  7:07 PM  Oxytocin given at the time of delivery: after delivery of baby  Delivering clinician: Jose J Carvajal MD   Other personnel present at delivery:  Provider Role   Julianne Quinn RN          Vaginal Counts     Initial count performed by 2 team members:  Two Team Members   Julianne Carvajal       Needles Suture Needles Sponges (RETIRED) Instruments   Initial counts 0 0 5    Added to count       Relief counts       Final counts             Placed during labor Accounted for at the end of labor   FSE     IUPC     Cervidil                       Apgars    Living status: Living   1 Minute 5 Minute 10 Minute 15 Minute 20 Minute   Skin color: 1  1       Heart rate: 2  2       Reflex irritability: 2  2       Muscle tone: 2  2       Respiratory effort: 2  2       Total: 9  9          Cord    Vessels: 3 Vessels    Cord Complications: None               Cord Blood Disposition: Discard    Gases Sent?: No    Delayed cord clamping?: Yes    Cord Clamping Delay (seconds): 31-60 seconds    Stem cell collection?: No       Elroy Resuscitation    Methods: None     Labor Events and Shoulder Dystocia    Fetal Tracing Prior to Delivery: Category 1  Shoulder dystocia present?: Neg     Delivery (Maternal) (Provider to Complete) (871552)    Episiotomy: None  Perineal lacerations: None    Repair suture: None  Genital tract inspection done: Pos     Blood Loss  Mother: Artie Rapp #[id number]   Start of Mother's Information    Delivery Blood Loss  22 1600 - 22 1924    None           End of Mother's Information  Mother: Artie Rapp #[id number]          Delivery - Provider to Complete (596094)    Delivering  clinician: Jose J Carvajal MD  Delivery Type (Choose the 1 that will go to the Birth History): Vaginal, Spontaneous                   Other personnel:  Provider Role   Julianne Quinn RN                 Placenta    Date/Time: 12/27/2022  7:07 PM  Removal: Spontaneous  Disposition: Hospital disposal           Anesthesia    Method: None                Presentation and Position    Presentation: Vertex    Position: Left Occiput Anterior                 Jose J Carvajal MD

## 2022-12-29 VITALS
OXYGEN SATURATION: 97 % | TEMPERATURE: 98.2 F | DIASTOLIC BLOOD PRESSURE: 62 MMHG | SYSTOLIC BLOOD PRESSURE: 110 MMHG | RESPIRATION RATE: 16 BRPM | HEART RATE: 68 BPM

## 2022-12-29 PROBLEM — Z34.90 PREGNANT: Status: RESOLVED | Noted: 2022-12-27 | Resolved: 2022-12-29

## 2022-12-29 PROBLEM — D62 ANEMIA DUE TO BLOOD LOSS, ACUTE: Status: ACTIVE | Noted: 2022-12-29

## 2022-12-29 LAB — HGB BLD-MCNC: 7.1 G/DL (ref 11.7–15.7)

## 2022-12-29 PROCEDURE — 99207 PR SUBSEQUENT HOSPITAL CARE FOR MOTHER, 15 MINUTES: CPT | Performed by: FAMILY MEDICINE

## 2022-12-29 PROCEDURE — 258N000003 HC RX IP 258 OP 636: Performed by: FAMILY MEDICINE

## 2022-12-29 PROCEDURE — 250N000013 HC RX MED GY IP 250 OP 250 PS 637: Performed by: FAMILY MEDICINE

## 2022-12-29 PROCEDURE — 36415 COLL VENOUS BLD VENIPUNCTURE: CPT | Performed by: FAMILY MEDICINE

## 2022-12-29 PROCEDURE — 85018 HEMOGLOBIN: CPT | Performed by: FAMILY MEDICINE

## 2022-12-29 PROCEDURE — 250N000011 HC RX IP 250 OP 636: Performed by: FAMILY MEDICINE

## 2022-12-29 RX ORDER — METHYLPREDNISOLONE SODIUM SUCCINATE 125 MG/2ML
125 INJECTION, POWDER, LYOPHILIZED, FOR SOLUTION INTRAMUSCULAR; INTRAVENOUS
Status: DISCONTINUED | OUTPATIENT
Start: 2022-12-29 | End: 2022-12-29 | Stop reason: HOSPADM

## 2022-12-29 RX ORDER — DIPHENHYDRAMINE HYDROCHLORIDE 50 MG/ML
50 INJECTION INTRAMUSCULAR; INTRAVENOUS
Status: DISCONTINUED | OUTPATIENT
Start: 2022-12-29 | End: 2022-12-29 | Stop reason: HOSPADM

## 2022-12-29 RX ORDER — DOCUSATE SODIUM 100 MG/1
100 CAPSULE, LIQUID FILLED ORAL DAILY
Qty: 30 CAPSULE | Refills: 0 | Status: CANCELLED | OUTPATIENT
Start: 2022-12-30

## 2022-12-29 RX ORDER — IBUPROFEN 800 MG/1
800 TABLET, FILM COATED ORAL EVERY 6 HOURS PRN
Qty: 30 TABLET | Refills: 0 | Status: CANCELLED | OUTPATIENT
Start: 2022-12-29

## 2022-12-29 RX ADMIN — IRON SUCROSE 300 MG: 20 INJECTION, SOLUTION INTRAVENOUS at 13:33

## 2022-12-29 RX ADMIN — PRENATAL VIT W/ FE FUMARATE-FA TAB 27-0.8 MG 1 TABLET: 27-0.8 TAB at 08:06

## 2022-12-29 RX ADMIN — FERROUS GLUCONATE 324 MG: 324 TABLET ORAL at 08:06

## 2022-12-29 RX ADMIN — DOCUSATE SODIUM 100 MG: 100 CAPSULE, LIQUID FILLED ORAL at 08:06

## 2022-12-29 ASSESSMENT — ACTIVITIES OF DAILY LIVING (ADL)
ADLS_ACUITY_SCORE: 18

## 2022-12-29 NOTE — PROGRESS NOTES
Outreach Nurse Note    Artie Rapp  [id number]  1995    Chart reviewed, discharge follow-up plan discussed with attending physician, and patient's bedside RN, needs assessed. Post-delivery follow-up appointment with  planned in 4 weeks at [facility]. Patient is reported to have support at home and is ready to discharge today with .     Outreach nurse will continue to follow and assist with discharge planning as needed. No additional discharge needs reported at this time.

## 2022-12-29 NOTE — PLAN OF CARE
Pt have tylenol and ibuprofen available for pain.   Postpartum assessment WNL and VSS.   Pt is hopeful for discharge to home today.  Completed mood assessment and birth certificate, needs ROP notarized.        Problem: Postpartum (Vaginal Delivery)  Goal: Optimal Pain Control and Function  Outcome: Progressing     Problem: Postpartum (Vaginal Delivery)  Goal: Effective Urinary Elimination  Outcome: Progressing     Problem: Postpartum (Vaginal Delivery)  Goal: Successful Maternal Role Transition  Outcome: Progressing

## 2022-12-29 NOTE — DISCHARGE SUMMARY
Maternal Discharge Summary  [facility]  Date of Service: 2022    Name      Artie Rapp         1995  MRN       [id number]  PCP        Jose J De Leon at [facility], [phone].    Admission Date:  2022  Discharge Date:  2022  ________________________________________________________________________    Assessment:  Artie Rapp is a 27 year old now  s/p vaginal, spontaneous  at 38w5d on 22.  Active Problems:    Normal vaginal delivery    Delayed delivery after SROM (spontaneous rupture of membranes)    Anemia due to blood loss, acute    IV iron prior to discharge- full dose dextran     Discharge Plan:   1. Discharge to Home. Condition at Discharge:  Stable  2. Physical activity:as tolerated  Watch for postpartum warning signs as discussed  3. Diet:  as tolerated  4. Home care nurse: declined by patient.  5. Lactation clinic appointment: not indicated.  6. Contraception plan: undecided, will follow up at postpartum visit.  7. Follow up with Jose J De Leon in 4 weeks.  Future Appointments   Date Time Provider Department Center   2022  1:40 PM Jose J Carvajal MD [facility]   2023  1:20 PM [facility]   2023  1:20 PM JoseJ Carvajal MD [facility]      8.   Immunizations:needs flu shot and covid booster     ________________________________________________________________________      Estimated Date of Delivery: Data Unavailable  Gestational Age at Delivery: 38w5d    Principal Diagnosis: Labor and delivery  Delivery type: Vaginal, Spontaneous     Subjective:  Postpartum day #2 s/p NVD  Patient doing well with no concerns. Lochia is mild without clots.  Pain is well controlled with Ibuprofen.  Eating and ambulating well. Normal urine output. Normal mood and affect.  Patient denies headache, dizziness, dyspnea, and leg pain.   Lactation NA  The baby is well and being fed by  bottle.     Discharge Exam:  Patient Vitals for the past 24 hrs:   BP Temp Temp src Pulse Resp SpO2   12/29/22 0800 99/62 97.8  F (36.6  C) Oral -- 16 97 %   12/28/22 2315 95/51 98.1  F (36.7  C) Oral 77 16 97 %   12/28/22 1730 99/56 97.8  F (36.6  C) Oral -- 16 98 %     General - alert, comfortable  Heart - RRR, no murmurs  Lungs - CTA bilaterally  Abdomen - fundus firm, nontender, below umbilicus  Extremities - trace edema  Admission on 12/27/2022   Component Date Value Ref Range Status     Treponema Antibody Total 12/27/2022 Nonreactive  Nonreactive Final     ABO/RH(D) 12/27/2022 B POS   Final     Antibody Screen 12/27/2022 Negative  Negative Final     SPECIMEN EXPIRATION DATE 12/27/2022 [id number]   Final     Hold Specimen 12/27/2022 [facility]   Final      Discharge Medications:   See medication reconciliation.     Completed by:   Antonina Blevins MD  [facility]  12/29/2022 9:12 AM   used: Not needed.

## 2022-12-29 NOTE — PLAN OF CARE
Pt Hbg was 7.1 g/dL this morning. Notified Dr. Blevins - planning to order IV iron infusion. Will put order in after confirming dosage with pharmacist. Planning to discharge later tonight.

## 2022-12-29 NOTE — PLAN OF CARE
Problem: Plan of Care - These are the overarching goals to be used throughout the patient stay.    Goal: Readiness for Transition of Care  12/29/2022 1534 by Renay Jay RN  Outcome: Met     Patient assessments and vitals are WDL. Educated patient on medications, worsening symptoms, and follow-up care. Pt verbalized understanding with no further questions and signed AVS. Sold Breast pump.

## 2023-01-14 ENCOUNTER — HEALTH MAINTENANCE LETTER (OUTPATIENT)
Age: 28
End: 2023-01-14

## 2023-01-17 ENCOUNTER — MYC MEDICAL ADVICE (OUTPATIENT)
Dept: FAMILY MEDICINE | Facility: CLINIC | Age: 28
End: 2023-01-17
Payer: COMMERCIAL

## 2023-01-23 NOTE — TELEPHONE ENCOUNTER
So can somebody confirm I have this form and if yes can you call her and tell I will do it tomorrow on 1/24/23 for sure.

## 2023-01-24 NOTE — TELEPHONE ENCOUNTER
Form completed and faxed to the number [phone]. Made a copy for scan and sent the original to patient home address.

## 2023-01-25 NOTE — TELEPHONE ENCOUNTER
Pt called back to follow up on request. I relay msg below to pt, pt verbalize understanding. Completing task.

## 2023-02-05 NOTE — SUMMARY OF CARE
Female as noted Delivery team called to delivery for meconium fluid baby delivered in two pushes,  a shelve made with lowering the foot of the bed for Dr. Carvajal. Large amount terminal meconium with delivary brought to the delivary team at 2 minutes of life see note. IV and pitocin started as noted for slight increase in bleeding and two blood clots QBL at  700 cc total .   Yes - the patient is able to be screened

## 2023-03-17 ENCOUNTER — MYC MEDICAL ADVICE (OUTPATIENT)
Dept: FAMILY MEDICINE | Facility: CLINIC | Age: 28
End: 2023-03-17
Payer: COMMERCIAL

## 2023-03-29 LAB
ABO/RH(D): NORMAL
ANTIBODY SCREEN: NEGATIVE
SPECIMEN EXPIRATION DATE: NORMAL

## 2023-03-30 ENCOUNTER — PRENATAL OFFICE VISIT (OUTPATIENT)
Dept: FAMILY MEDICINE | Facility: CLINIC | Age: 28
End: 2023-03-30
Payer: COMMERCIAL

## 2023-03-30 VITALS
SYSTOLIC BLOOD PRESSURE: 90 MMHG | BODY MASS INDEX: 24.22 KG/M2 | DIASTOLIC BLOOD PRESSURE: 62 MMHG | OXYGEN SATURATION: 99 % | WEIGHT: 124 LBS | RESPIRATION RATE: 18 BRPM | HEART RATE: 76 BPM

## 2023-03-30 DIAGNOSIS — Z3A.09 9 WEEKS GESTATION OF PREGNANCY: Primary | ICD-10-CM

## 2023-03-30 DIAGNOSIS — Z32.00 POSSIBLE PREGNANCY: ICD-10-CM

## 2023-03-30 LAB
ALBUMIN UR-MCNC: NEGATIVE MG/DL
BASOPHILS # BLD AUTO: 0 10E3/UL (ref 0–0.2)
BASOPHILS NFR BLD AUTO: 0 %
EOSINOPHIL # BLD AUTO: 0 10E3/UL (ref 0–0.7)
EOSINOPHIL NFR BLD AUTO: 0 %
ERYTHROCYTE [DISTWIDTH] IN BLOOD BY AUTOMATED COUNT: 11.9 % (ref 10–15)
GLUCOSE UR STRIP-MCNC: NEGATIVE MG/DL
HBA1C MFR BLD: 4.9 % (ref 0–5.6)
HBV SURFACE AG SERPL QL IA: NONREACTIVE
HCG UR QL: POSITIVE
HCT VFR BLD AUTO: 40.4 % (ref 35–47)
HCV AB SERPL QL IA: NONREACTIVE
HGB BLD-MCNC: 13.4 G/DL (ref 11.7–15.7)
HIV 1+2 AB+HIV1 P24 AG SERPL QL IA: NONREACTIVE
IMM GRANULOCYTES # BLD: 0 10E3/UL
IMM GRANULOCYTES NFR BLD: 0 %
KETONES UR STRIP-MCNC: 40 MG/DL
LYMPHOCYTES # BLD AUTO: 1.1 10E3/UL (ref 0.8–5.3)
LYMPHOCYTES NFR BLD AUTO: 16 %
MCH RBC QN AUTO: 31 PG (ref 26.5–33)
MCHC RBC AUTO-ENTMCNC: 33.2 G/DL (ref 31.5–36.5)
MCV RBC AUTO: 94 FL (ref 78–100)
MONOCYTES # BLD AUTO: 0.3 10E3/UL (ref 0–1.3)
MONOCYTES NFR BLD AUTO: 4 %
NEUTROPHILS # BLD AUTO: 5.3 10E3/UL (ref 1.6–8.3)
NEUTROPHILS NFR BLD AUTO: 79 %
PLATELET # BLD AUTO: 277 10E3/UL (ref 150–450)
RBC # BLD AUTO: 4.32 10E6/UL (ref 3.8–5.2)
WBC # BLD AUTO: 6.6 10E3/UL (ref 4–11)

## 2023-03-30 PROCEDURE — 87591 N.GONORRHOEAE DNA AMP PROB: CPT | Performed by: PHYSICIAN ASSISTANT

## 2023-03-30 PROCEDURE — 81003 URINALYSIS AUTO W/O SCOPE: CPT | Performed by: PHYSICIAN ASSISTANT

## 2023-03-30 PROCEDURE — 36415 COLL VENOUS BLD VENIPUNCTURE: CPT | Performed by: PHYSICIAN ASSISTANT

## 2023-03-30 PROCEDURE — 86850 RBC ANTIBODY SCREEN: CPT | Performed by: PHYSICIAN ASSISTANT

## 2023-03-30 PROCEDURE — 86900 BLOOD TYPING SEROLOGIC ABO: CPT | Performed by: PHYSICIAN ASSISTANT

## 2023-03-30 PROCEDURE — 99207 PR PRENATAL FLOW SHEET: CPT | Performed by: PHYSICIAN ASSISTANT

## 2023-03-30 PROCEDURE — 83655 ASSAY OF LEAD: CPT | Mod: 90 | Performed by: PHYSICIAN ASSISTANT

## 2023-03-30 PROCEDURE — 86901 BLOOD TYPING SEROLOGIC RH(D): CPT | Performed by: PHYSICIAN ASSISTANT

## 2023-03-30 PROCEDURE — 86762 RUBELLA ANTIBODY: CPT | Performed by: PHYSICIAN ASSISTANT

## 2023-03-30 PROCEDURE — 99000 SPECIMEN HANDLING OFFICE-LAB: CPT | Performed by: PHYSICIAN ASSISTANT

## 2023-03-30 PROCEDURE — 87491 CHLMYD TRACH DNA AMP PROBE: CPT | Performed by: PHYSICIAN ASSISTANT

## 2023-03-30 PROCEDURE — 83036 HEMOGLOBIN GLYCOSYLATED A1C: CPT | Performed by: PHYSICIAN ASSISTANT

## 2023-03-30 PROCEDURE — 99214 OFFICE O/P EST MOD 30 MIN: CPT | Performed by: PHYSICIAN ASSISTANT

## 2023-03-30 PROCEDURE — 87340 HEPATITIS B SURFACE AG IA: CPT | Performed by: PHYSICIAN ASSISTANT

## 2023-03-30 PROCEDURE — 87389 HIV-1 AG W/HIV-1&-2 AB AG IA: CPT | Performed by: PHYSICIAN ASSISTANT

## 2023-03-30 PROCEDURE — 85025 COMPLETE CBC W/AUTO DIFF WBC: CPT | Performed by: PHYSICIAN ASSISTANT

## 2023-03-30 PROCEDURE — 86780 TREPONEMA PALLIDUM: CPT | Performed by: PHYSICIAN ASSISTANT

## 2023-03-30 PROCEDURE — 81025 URINE PREGNANCY TEST: CPT | Performed by: PHYSICIAN ASSISTANT

## 2023-03-30 PROCEDURE — 86803 HEPATITIS C AB TEST: CPT | Performed by: PHYSICIAN ASSISTANT

## 2023-03-30 PROCEDURE — 87086 URINE CULTURE/COLONY COUNT: CPT | Performed by: PHYSICIAN ASSISTANT

## 2023-03-30 RX ORDER — PNV NO.95/FERROUS FUM/FOLIC AC 28MG-0.8MG
1 TABLET ORAL DAILY
Qty: 100 CAPSULE | Refills: 3 | Status: SHIPPED | OUTPATIENT
Start: 2023-03-30

## 2023-03-30 NOTE — LETTER
03/30/23          To Whom It May Concern:      Alvino Perkins (1995) is pregnant.  Estimated Date of Delivery: Nov 2, 2023        Sincerely,    Reena Regalado PA-C

## 2023-03-30 NOTE — PATIENT INSTRUCTIONS
Pregnancy: 9 weeks    Due Date: November 2, 2023    Next visit in 4 weeks  With Dr. Welch  For Your First OB Visit      Someone should be calling you within 2-3 days to schedule your ultrasound appointment.  If you would like to schedule your ultrasound yourself, call #[phone].

## 2023-03-31 LAB
C TRACH DNA SPEC QL PROBE+SIG AMP: NEGATIVE
LEAD BLDV-MCNC: <2 UG/DL
N GONORRHOEA DNA SPEC QL NAA+PROBE: NEGATIVE
RUBV IGG SERPL QL IA: 1.01 INDEX
RUBV IGG SERPL QL IA: POSITIVE
T PALLIDUM AB SER QL: NONREACTIVE

## 2023-04-01 LAB — BACTERIA UR CULT: NORMAL

## 2023-04-03 NOTE — PROGRESS NOTES
"ASSESSMENT and PLAN:  1. Pregnancy Intake Appointment  Alvino Perkins is 27 year old and .  Patient's last menstrual period was 2023.  Estimated Date of Delivery: 2023  She will be seeing Dr. Welch for OB care at [facility].  Screening pregnancy lab work was drawn.  Prenatal vitamin prescribed.  Problem list and current medications reviewed and updated.  Dating ultrasound ordered.  Potential exposures/risks discussed: work environment, raw meat/seafood/deli meat, home construction, cats, caffeine.  First trimester genetic screening test was discussed with patient.    She is undecided regarding Nuchal translucency ultrasound.  She will let us know in the next week if she wants to have this done.  She got a flu shot this year.    She declines Covid booster.  Follow up in 4 weeks.        HPI:  lAvino Perkins is a 27 year old female here for pregnancy intake.  She is .  Patient's last menstrual period was 2023.  Positive home test on 23.    Of note, she has a twin sister \"Alvino Perkins\" who has been pregnant before.    Past Medical History:   Diagnosis Date     Gonorrhea      Urinary tract infection         History reviewed. No pertinent surgical history.     Current Outpatient Medications   Medication     Prenatal MV-Min-Fe Fum-FA-DHA (PRENATAL MULTIVITAMIN PLUS DHA) 27-0.8-250 MG CAPS     No current facility-administered medications for this visit.          OBJECTIVE:  No Known Allergies  BP 90/62 (BP Location: Left arm, Patient Position: Sitting, Cuff Size: Adult Regular)   Pulse 76   Resp 18   Wt 56.2 kg (124 lb)   LMP 2023   SpO2 99%   BMI 24.22 kg/m     Body mass index is 24.22 kg/m .     Vital signs stable as recorded above.  General: Patient is alert and oriented x 3, in no apparent distress  Remainder of physical exam deferred to patient's First OB Visit.      Prenatal Office Visit on 2023   Component Date Value Ref Range Status     hCG Urine Qualitative 2023 " Positive (A)  Negative Final     Culture 03/30/2023 <10,000 CFU/mL Urogenital irais   Final     Chlamydia Trachomatis 03/30/2023 Negative  Negative Final     Neisseria gonorrhoeae 03/30/2023 Negative  Negative Final     Glucose Urine 03/30/2023 Negative  Negative mg/dL Final     Ketones Urine 03/30/2023 40 (A)  Negative mg/dL Final     Protein Albumin Urine 03/30/2023 Negative  Negative mg/dL Final     Hepatitis B Surface Antigen 03/30/2023 Nonreactive  Nonreactive Final     HIV Antigen Antibody Combo 03/30/2023 Nonreactive  Nonreactive Final     Rubella Maricruz IgG Instrument Value 03/30/2023 1.01  <0.90 Index Final     Rubella Antibody IgG 03/30/2023 Positive   Final     Treponema Antibody Total 03/30/2023 Nonreactive  Nonreactive Final     Hemoglobin A1C 03/30/2023 4.9  0.0 - 5.6 % Final     Hepatitis C Antibody 03/30/2023 Nonreactive  Nonreactive Final     Lead Venous Blood 03/30/2023 <2.0  <=4.9 ug/dL Final     ABO/RH(D) 03/30/2023 B POS   Final     Antibody Screen 03/30/2023 Negative  Negative Final     SPECIMEN EXPIRATION DATE 03/30/2023 [id number]   Final     WBC Count 03/30/2023 6.6  4.0 - 11.0 10e3/uL Final     RBC Count 03/30/2023 4.32  3.80 - 5.20 10e6/uL Final     Hemoglobin 03/30/2023 13.4  11.7 - 15.7 g/dL Final     Hematocrit 03/30/2023 40.4  35.0 - 47.0 % Final     MCV 03/30/2023 94  78 - 100 fL Final     MCH 03/30/2023 31.0  26.5 - 33.0 pg Final     MCHC 03/30/2023 33.2  31.5 - 36.5 g/dL Final     RDW 03/30/2023 11.9  10.0 - 15.0 % Final     Platelet Count 03/30/2023 277  150 - 450 10e3/uL Final     % Neutrophils 03/30/2023 79  % Final     % Lymphocytes 03/30/2023 16  % Final     % Monocytes 03/30/2023 4  % Final     % Eosinophils 03/30/2023 0  % Final     % Basophils 03/30/2023 0  % Final     % Immature Granulocytes 03/30/2023 0  % Final     Absolute Neutrophils 03/30/2023 5.3  1.6 - 8.3 10e3/uL Final     Absolute Lymphocytes 03/30/2023 1.1  0.8 - 5.3 10e3/uL Final     Absolute Monocytes  03/30/2023 0.3  0.0 - 1.3 10e3/uL Final     Absolute Eosinophils 03/30/2023 0.0  0.0 - 0.7 10e3/uL Final     Absolute Basophils 03/30/2023 0.0  0.0 - 0.2 10e3/uL Final     Absolute Immature Granulocytes 03/30/2023 0.0  <=0.4 10e3/uL Final          Other screening pregnancy lab work is pending.      Please see OB Episode for complete details.    30 minutes spent on the date of the encounter doing chart review, history and exam, and documentation.    This dictation uses voice recognition software, which may contain typographical errors.

## 2023-04-12 ENCOUNTER — ANCILLARY ORDERS (OUTPATIENT)
Dept: FAMILY MEDICINE | Facility: CLINIC | Age: 28
End: 2023-04-12

## 2023-04-12 ENCOUNTER — HOSPITAL ENCOUNTER (OUTPATIENT)
Dept: ULTRASOUND IMAGING | Facility: HOSPITAL | Age: 28
Discharge: HOME | End: 2023-04-12
Attending: PHYSICIAN ASSISTANT | Admitting: PHYSICIAN ASSISTANT
Payer: COMMERCIAL

## 2023-04-12 DIAGNOSIS — Z3A.09 9 WEEKS GESTATION OF PREGNANCY: ICD-10-CM

## 2023-04-12 PROCEDURE — 76801 OB US < 14 WKS SINGLE FETUS: CPT

## 2023-05-04 ENCOUNTER — MEDICAL CORRESPONDENCE (OUTPATIENT)
Dept: HEALTH INFORMATION MANAGEMENT | Facility: CLINIC | Age: 28
End: 2023-05-04
Payer: COMMERCIAL

## 2023-05-10 ENCOUNTER — PRENATAL OFFICE VISIT (OUTPATIENT)
Dept: FAMILY MEDICINE | Facility: CLINIC | Age: 28
End: 2023-05-10
Payer: COMMERCIAL

## 2023-05-10 VITALS
SYSTOLIC BLOOD PRESSURE: 102 MMHG | WEIGHT: 122 LBS | DIASTOLIC BLOOD PRESSURE: 58 MMHG | RESPIRATION RATE: 16 BRPM | HEIGHT: 61 IN | TEMPERATURE: 97.4 F | BODY MASS INDEX: 23.03 KG/M2 | HEART RATE: 76 BPM

## 2023-05-10 DIAGNOSIS — Z34.00 SUPERVISION OF NORMAL FIRST PREGNANCY, ANTEPARTUM: Primary | ICD-10-CM

## 2023-05-10 LAB
ALBUMIN UR-MCNC: 30 MG/DL
GLUCOSE UR STRIP-MCNC: NEGATIVE MG/DL
KETONES UR STRIP-MCNC: NEGATIVE MG/DL

## 2023-05-10 PROCEDURE — 99207 PR PRENATAL VISIT: CPT | Performed by: FAMILY MEDICINE

## 2023-05-10 PROCEDURE — 81003 URINALYSIS AUTO W/O SCOPE: CPT | Performed by: FAMILY MEDICINE

## 2023-05-10 NOTE — PROGRESS NOTES
First OB.   Feeling well. Some round ligament pain.   No bleeding.   Works full time. .   No new concerns.   Discussed quad screen, declines.   Dating reviewed, we discussed options of dating and switched to ultrasound date.   Follow up in four weeks.

## 2023-06-02 ENCOUNTER — HEALTH MAINTENANCE LETTER (OUTPATIENT)
Age: 28
End: 2023-06-02

## 2023-06-06 ENCOUNTER — PRENATAL OFFICE VISIT (OUTPATIENT)
Dept: FAMILY MEDICINE | Facility: CLINIC | Age: 28
End: 2023-06-06
Payer: COMMERCIAL

## 2023-06-06 VITALS
HEART RATE: 60 BPM | SYSTOLIC BLOOD PRESSURE: 90 MMHG | BODY MASS INDEX: 23.47 KG/M2 | OXYGEN SATURATION: 98 % | WEIGHT: 123.5 LBS | TEMPERATURE: 98.5 F | DIASTOLIC BLOOD PRESSURE: 60 MMHG

## 2023-06-06 DIAGNOSIS — Z34.00 SUPERVISION OF NORMAL FIRST PREGNANCY, ANTEPARTUM: Primary | ICD-10-CM

## 2023-06-06 PROCEDURE — 99207 PR PRENATAL VISIT: CPT | Performed by: FAMILY MEDICINE

## 2023-06-06 NOTE — PROGRESS NOTES
Feeling ok.   Works as RN at [email or web address].   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   Brings a workability form.   This was completed, see scanned form.   Declines quad screen.   OB us ordered, will schedule in two weeks.   Follow up in four weeks.

## 2023-06-14 ENCOUNTER — HOSPITAL ENCOUNTER (OUTPATIENT)
Dept: ULTRASOUND IMAGING | Facility: HOSPITAL | Age: 28
Discharge: HOME | End: 2023-06-14
Attending: FAMILY MEDICINE | Admitting: FAMILY MEDICINE
Payer: COMMERCIAL

## 2023-06-14 DIAGNOSIS — Z34.00 SUPERVISION OF NORMAL FIRST PREGNANCY, ANTEPARTUM: ICD-10-CM

## 2023-06-14 PROCEDURE — 76805 OB US >/= 14 WKS SNGL FETUS: CPT

## 2023-07-05 ENCOUNTER — PRENATAL OFFICE VISIT (OUTPATIENT)
Dept: FAMILY MEDICINE | Facility: CLINIC | Age: 28
End: 2023-07-05
Payer: COMMERCIAL

## 2023-07-05 VITALS
BODY MASS INDEX: 23.56 KG/M2 | HEART RATE: 71 BPM | RESPIRATION RATE: 16 BRPM | WEIGHT: 124 LBS | DIASTOLIC BLOOD PRESSURE: 60 MMHG | TEMPERATURE: 97.7 F | OXYGEN SATURATION: 100 % | SYSTOLIC BLOOD PRESSURE: 93 MMHG

## 2023-07-05 DIAGNOSIS — Z34.00 SUPERVISION OF NORMAL FIRST PREGNANCY, ANTEPARTUM: Primary | ICD-10-CM

## 2023-07-05 PROCEDURE — 99207 PR PRENATAL VISIT: CPT | Performed by: FAMILY MEDICINE

## 2023-07-05 PROCEDURE — 81003 URINALYSIS AUTO W/O SCOPE: CPT | Performed by: FAMILY MEDICINE

## 2023-07-05 NOTE — PROGRESS NOTES
No contractions, loss of fluid, bleeding. +FM.   A little constipation, reviewed dietary changes, will try that.   Reviewed fetal survey.   Follow up in four weeks for one hour glucose.

## 2023-07-06 ENCOUNTER — MEDICAL CORRESPONDENCE (OUTPATIENT)
Dept: HEALTH INFORMATION MANAGEMENT | Facility: CLINIC | Age: 28
End: 2023-07-06
Payer: COMMERCIAL

## 2023-07-24 LAB
ABO/RH(D): NORMAL
ANTIBODY SCREEN: NEGATIVE
SPECIMEN EXPIRATION DATE: NORMAL

## 2023-07-25 ENCOUNTER — PRENATAL OFFICE VISIT (OUTPATIENT)
Dept: FAMILY MEDICINE | Facility: CLINIC | Age: 28
End: 2023-07-25
Payer: COMMERCIAL

## 2023-07-25 VITALS
HEART RATE: 59 BPM | RESPIRATION RATE: 18 BRPM | BODY MASS INDEX: 25.82 KG/M2 | OXYGEN SATURATION: 98 % | SYSTOLIC BLOOD PRESSURE: 100 MMHG | TEMPERATURE: 97.7 F | HEIGHT: 58 IN | DIASTOLIC BLOOD PRESSURE: 64 MMHG | WEIGHT: 123 LBS

## 2023-07-25 DIAGNOSIS — Z3A.09 9 WEEKS GESTATION OF PREGNANCY: ICD-10-CM

## 2023-07-25 DIAGNOSIS — N92.6 MISSED MENSES: ICD-10-CM

## 2023-07-25 DIAGNOSIS — Z3A.19 19 WEEKS GESTATION OF PREGNANCY: Primary | ICD-10-CM

## 2023-07-25 PROBLEM — Z34.82 ENCOUNTER FOR SUPERVISION OF OTHER NORMAL PREGNANCY, SECOND TRIMESTER: Status: RESOLVED | Noted: 2022-07-06 | Resolved: 2023-07-25

## 2023-07-25 PROBLEM — O42.90 DELAYED DELIVERY AFTER SROM (SPONTANEOUS RUPTURE OF MEMBRANES): Status: RESOLVED | Noted: 2022-12-27 | Resolved: 2023-07-25

## 2023-07-25 LAB
ALBUMIN UR-MCNC: 30 MG/DL
BASOPHILS # BLD AUTO: 0 10E3/UL (ref 0–0.2)
BASOPHILS NFR BLD AUTO: 0 %
EOSINOPHIL # BLD AUTO: 0 10E3/UL (ref 0–0.7)
EOSINOPHIL NFR BLD AUTO: 0 %
ERYTHROCYTE [DISTWIDTH] IN BLOOD BY AUTOMATED COUNT: 14.4 % (ref 10–15)
GLUCOSE UR STRIP-MCNC: NEGATIVE MG/DL
HCG UR QL: POSITIVE
HCT VFR BLD AUTO: 32 % (ref 35–47)
HGB BLD-MCNC: 10.4 G/DL (ref 11.7–15.7)
IMM GRANULOCYTES # BLD: 0 10E3/UL
IMM GRANULOCYTES NFR BLD: 0 %
KETONES UR STRIP-MCNC: ABNORMAL MG/DL
LYMPHOCYTES # BLD AUTO: 1.5 10E3/UL (ref 0.8–5.3)
LYMPHOCYTES NFR BLD AUTO: 23 %
MCH RBC QN AUTO: 29.1 PG (ref 26.5–33)
MCHC RBC AUTO-ENTMCNC: 32.5 G/DL (ref 31.5–36.5)
MCV RBC AUTO: 90 FL (ref 78–100)
MONOCYTES # BLD AUTO: 0.2 10E3/UL (ref 0–1.3)
MONOCYTES NFR BLD AUTO: 3 %
NEUTROPHILS # BLD AUTO: 4.8 10E3/UL (ref 1.6–8.3)
NEUTROPHILS NFR BLD AUTO: 73 %
PLATELET # BLD AUTO: 254 10E3/UL (ref 150–450)
RBC # BLD AUTO: 3.57 10E6/UL (ref 3.8–5.2)
RUBV IGG SERPL QL IA: 1.06 INDEX
RUBV IGG SERPL QL IA: POSITIVE
T PALLIDUM AB SER QL: NONREACTIVE
WBC # BLD AUTO: 6.6 10E3/UL (ref 4–11)

## 2023-07-25 PROCEDURE — 87591 N.GONORRHOEAE DNA AMP PROB: CPT | Performed by: PHYSICIAN ASSISTANT

## 2023-07-25 PROCEDURE — 86762 RUBELLA ANTIBODY: CPT | Performed by: PHYSICIAN ASSISTANT

## 2023-07-25 PROCEDURE — 86780 TREPONEMA PALLIDUM: CPT | Performed by: PHYSICIAN ASSISTANT

## 2023-07-25 PROCEDURE — 81025 URINE PREGNANCY TEST: CPT | Performed by: PHYSICIAN ASSISTANT

## 2023-07-25 PROCEDURE — 86850 RBC ANTIBODY SCREEN: CPT | Performed by: PHYSICIAN ASSISTANT

## 2023-07-25 PROCEDURE — 36415 COLL VENOUS BLD VENIPUNCTURE: CPT | Performed by: PHYSICIAN ASSISTANT

## 2023-07-25 PROCEDURE — 81003 URINALYSIS AUTO W/O SCOPE: CPT | Performed by: PHYSICIAN ASSISTANT

## 2023-07-25 PROCEDURE — 99214 OFFICE O/P EST MOD 30 MIN: CPT | Performed by: PHYSICIAN ASSISTANT

## 2023-07-25 PROCEDURE — 82105 ALPHA-FETOPROTEIN SERUM: CPT | Mod: 90 | Performed by: PHYSICIAN ASSISTANT

## 2023-07-25 PROCEDURE — 85025 COMPLETE CBC W/AUTO DIFF WBC: CPT | Performed by: PHYSICIAN ASSISTANT

## 2023-07-25 PROCEDURE — 83655 ASSAY OF LEAD: CPT | Mod: 90 | Performed by: PHYSICIAN ASSISTANT

## 2023-07-25 PROCEDURE — 99000 SPECIMEN HANDLING OFFICE-LAB: CPT | Performed by: PHYSICIAN ASSISTANT

## 2023-07-25 PROCEDURE — 87340 HEPATITIS B SURFACE AG IA: CPT | Performed by: PHYSICIAN ASSISTANT

## 2023-07-25 PROCEDURE — 87086 URINE CULTURE/COLONY COUNT: CPT | Performed by: PHYSICIAN ASSISTANT

## 2023-07-25 PROCEDURE — 86900 BLOOD TYPING SEROLOGIC ABO: CPT | Performed by: PHYSICIAN ASSISTANT

## 2023-07-25 PROCEDURE — 87389 HIV-1 AG W/HIV-1&-2 AB AG IA: CPT | Performed by: PHYSICIAN ASSISTANT

## 2023-07-25 PROCEDURE — 87491 CHLMYD TRACH DNA AMP PROBE: CPT | Performed by: PHYSICIAN ASSISTANT

## 2023-07-25 PROCEDURE — 86901 BLOOD TYPING SEROLOGIC RH(D): CPT | Performed by: PHYSICIAN ASSISTANT

## 2023-07-25 RX ORDER — PNV NO.95/FERROUS FUM/FOLIC AC 28MG-0.8MG
1 TABLET ORAL DAILY
Qty: 100 CAPSULE | Refills: 3 | Status: SHIPPED | OUTPATIENT
Start: 2023-07-25

## 2023-07-25 NOTE — LETTER
07/25/23          To Whom It May Concern:      Artie Rapp (1995) is pregnant.  Estimated Date of Delivery: Dec 15, 2023        Sincerely,    Meghan Null PA-C

## 2023-07-25 NOTE — PATIENT INSTRUCTIONS
Pregnancy: 19 weeks    Due Date: December 15, 2023    Next visit in 4 weeks  With Dr. Carvajal  For Your First OB Visit        Someone should be calling you within 2-3 days to schedule your ultrasound appointment.  If you would like to schedule your ultrasound yourself, call #[phone].

## 2023-07-26 LAB
C TRACH DNA SPEC QL PROBE+SIG AMP: NEGATIVE
HBV SURFACE AG SERPL QL IA: NONREACTIVE
HIV 1+2 AB+HIV1 P24 AG SERPL QL IA: NONREACTIVE
N GONORRHOEA DNA SPEC QL NAA+PROBE: NEGATIVE

## 2023-07-27 LAB
# FETUSES US: NORMAL
AFP MOM SERPL: 1.19
AFP SERPL-MCNC: 76 NG/ML
AGE - REPORTED: 27.3 YR
BACTERIA UR CULT: NORMAL
CURRENT SMOKER: NO
FAMILY MEMBER DISEASES HX: NO
GA METHOD: NORMAL
GA: NORMAL WK
IDDM PATIENT QL: NO
INTEGRATED SCN PATIENT-IMP: NORMAL
LEAD BLDV-MCNC: <2 UG/DL
SPECIMEN DRAWN SERPL: NORMAL

## 2023-07-29 LAB
# FETUSES US: NORMAL
AFP MOM SERPL: 1.19
AFP SERPL-MCNC: 76 NG/ML
AGE - REPORTED: 27.3 YR
CURRENT SMOKER: NO
FAMILY MEMBER DISEASES HX: NO
GA METHOD: NORMAL
GA: NORMAL WK
HCG MOM SERPL: 1
HCG SERPL-ACNC: NORMAL IU/L
HX OF HEREDITARY DISORDERS: NO
IDDM PATIENT QL: NO
INHIBIN A MOM SERPL: 0.86
INHIBIN A SERPL-MCNC: 149 PG/ML
INTEGRATED SCN PATIENT-IMP: NORMAL
PATHOLOGY STUDY: NORMAL
SPECIMEN DRAWN SERPL: NORMAL
U ESTRIOL MOM SERPL: 0.88
U ESTRIOL SERPL-MCNC: 2.02 NG/ML

## 2023-07-31 NOTE — PROGRESS NOTES
ASSESSMENT and PLAN:  Pregnancy Intake Appointment  Artie Rapp is 27 year old and .  Patient's last menstrual period was 03/10/2023.  Estimated Date of Delivery: Dec 15, 2023  She will be seeing Dr. Carvajal for OB care at [facility].  Screening pregnancy lab work was drawn.  Prenatal vitamin prescribed.  Problem list and current medications reviewed and updated.  Dating and anatomic survey ultrasound ordered.  Potential exposures/risks discussed: work environment, raw meat/seafood/deli meat, home construction, cats, caffeine.  Genetic screening test option was discussed with patient.    She would like the Quad Screen, ordered today.    Follow up in 4 weeks.          HPI:  Artie Rapp is a 27 year old female here for pregnancy intake.  She is .  Patient's last menstrual period was 03/10/2023.  Positive home pregnancy test in April.  Baby's father's nephew has autism.  She declined COVID-vaccine.    Past Medical History:   Diagnosis Date    Anemia     borderline    Delayed delivery after SROM (spontaneous rupture of membranes) 2022    Normal vaginal delivery 2019    Delivered on the toilet.  Coupling noted.  arom of clear fluid.  No meds.    Received cytotec and pitocin for bleeding.         Urinary tract infection     Vaginal delivery 2019    Delivered on the toilet.  Coupling noted.  arom of clear fluid.  No meds.  Received cytotec and pitocin for bleeding.      Vaginal delivery 2018    Unmedicated.  srom of clear fluid.  Delivered quickly after this.  Left labial laceration repaired.          Past Surgical History:   Procedure Laterality Date    DILATION AND CURETTAGE N/A 2020    Procedure: SUCTION D&C;  Surgeon: Stephanie Pham DO;  Location: [facility];  Service: Obstetrics        Current Outpatient Medications   Medication    Prenatal MV-Min-Fe Fum-FA-DHA (PRENATAL MULTIVITAMIN PLUS DHA) 27-0.8-250 MG CAPS     No current facility-administered medications  "for this visit.          OBJECTIVE:  No Known Allergies  /64 (BP Location: Left arm, Patient Position: Sitting, Cuff Size: Adult Regular)   Pulse 59   Temp 97.7  F (36.5  C) (Temporal)   Resp 18   Ht 1.473 m (4' 10\")   Wt 55.8 kg (123 lb)   LMP 03/10/2023   SpO2 98%   BMI 25.71 kg/m     Body mass index is 25.71 kg/m .     Vital signs stable as recorded above.  General: Patient is alert and oriented x 3, in no apparent distress  Fetal Exam: FHR heard at 145 bpm, fundal height palpable at umbilicus, patient is possibly feeling some fluttering    Prenatal Office Visit on 07/25/2023   Component Date Value Ref Range Status    hCG Urine Qualitative 07/25/2023 Positive (A)  Negative Final    Chlamydia Trachomatis 07/25/2023 Negative  Negative Final    Neisseria gonorrhoeae 07/25/2023 Negative  Negative Final    Hepatitis B Surface Antigen 07/25/2023 Nonreactive  Nonreactive Final    HIV Antigen Antibody Combo 07/25/2023 Nonreactive  Nonreactive Final    Rubella Arminda IgG Instrument Value 07/25/2023 1.06  <0.90 Index Final    Rubella Antibody IgG 07/25/2023 Positive   Final    Treponema Antibody Total 07/25/2023 Nonreactive  Nonreactive Final    Lead Venous Blood 07/25/2023 <2.0  <=4.9 ug/dL Final    Alpha Feto Protein 07/25/2023 76  ng/mL Final    MoM for AFP 07/25/2023 1.19   Final    AFP Interpretation 07/25/2023 Screen Neg   Final    Maternal Age at Delivery 07/25/2023 27.3  yr Final    Patient Weight 07/25/2023 123.0 lbs.   Final    Estimated Due Date 07/25/2023 12-15-23   Final    Gestational Age Calculated 07/25/2023 19 wks, 4 days   Final    Dating 07/25/2023 Other   Final    Num of Fetuses 07/25/2023 Duran   Final    Maternal Race 07/25/2023 Nonblack   Final    Insulin Diabetic 07/25/2023 No   Final    Smoking Status 07/25/2023 No   Final    Fam History of NTD 07/25/2023 No   Final    Specimen Iteration 07/25/2023 See Note   Final    ABO/RH(D) 07/25/2023 B POS   Final    Antibody Screen 07/25/2023 " Negative  Negative Final    SPECIMEN EXPIRATION DATE 07/25/2023 [id number]   Final    WBC Count 07/25/2023 6.6  4.0 - 11.0 10e3/uL Final    RBC Count 07/25/2023 3.57 (L)  3.80 - 5.20 10e6/uL Final    Hemoglobin 07/25/2023 10.4 (L)  11.7 - 15.7 g/dL Final    Hematocrit 07/25/2023 32.0 (L)  35.0 - 47.0 % Final    MCV 07/25/2023 90  78 - 100 fL Final    MCH 07/25/2023 29.1  26.5 - 33.0 pg Final    MCHC 07/25/2023 32.5  31.5 - 36.5 g/dL Final    RDW 07/25/2023 14.4  10.0 - 15.0 % Final    Platelet Count 07/25/2023 254  150 - 450 10e3/uL Final    % Neutrophils 07/25/2023 73  % Final    % Lymphocytes 07/25/2023 23  % Final    % Monocytes 07/25/2023 3  % Final    % Eosinophils 07/25/2023 0  % Final    % Basophils 07/25/2023 0  % Final    % Immature Granulocytes 07/25/2023 0  % Final    Absolute Neutrophils 07/25/2023 4.8  1.6 - 8.3 10e3/uL Final    Absolute Lymphocytes 07/25/2023 1.5  0.8 - 5.3 10e3/uL Final    Absolute Monocytes 07/25/2023 0.2  0.0 - 1.3 10e3/uL Final    Absolute Eosinophils 07/25/2023 0.0  0.0 - 0.7 10e3/uL Final    Absolute Basophils 07/25/2023 0.0  0.0 - 0.2 10e3/uL Final    Absolute Immature Granulocytes 07/25/2023 0.0  <=0.4 10e3/uL Final    Glucose Urine 07/25/2023 Negative  Negative mg/dL Final    Ketones Urine 07/25/2023 Trace (A)  Negative mg/dL Final    Protein Albumin Urine 07/25/2023 30 (A)  Negative mg/dL Final    Culture 07/25/2023 10,000-50,000 CFU/mL Urogenital mayco   Final    Alpha Feto Protein 07/25/2023 76  ng/mL Final    MoM for AFP 07/25/2023 1.19   Final    Estriol 07/25/2023 2.02  ng/mL Final    MoM uE3 07/25/2023 0.88   Final    hCG 2nd Trimester 07/25/2023 42639  IU/L Final    MoM hCG 2nd Trimester 07/25/2023 1.00   Final    Dimeric Inhibin A 07/25/2023 149  pg/mL Final    MoM Dimeric Inhibin A 07/25/2023 0.86   Final    AFP Interpretation 07/25/2023 Screen Neg   Final    Maternal Age at Delivery 07/25/2023 27.3  yr Final    Patient Weight 07/25/2023 123.0 lbs.   Final     Estimated Due Date 07/25/2023 12-15-23   Final    Gestational Age Calculated 07/25/2023 19 wks, 4 days   Final    Dating 07/25/2023 Other   Final    Num of Fetuses 07/25/2023 Duran   Final    Maternal Race 07/25/2023 Nonblack   Final    Insulin Diabetic 07/25/2023 No   Final    Smoking Status 07/25/2023 No   Final    Fam History of NTD 07/25/2023 No   Final    Family History of Aneuploidy 07/25/2023 No   Final    Specimen Iteration 07/25/2023 See Note   Final    Electronic Enhanced Report 07/25/2023 See Note   Final          Other screening pregnancy lab work is pending.      Please see OB Episode for complete details.    This dictation uses voice recognition software, which may contain typographical errors.

## 2023-08-03 ENCOUNTER — HOSPITAL ENCOUNTER (OUTPATIENT)
Dept: ULTRASOUND IMAGING | Facility: HOSPITAL | Age: 28
Discharge: HOME | End: 2023-08-03
Attending: PHYSICIAN ASSISTANT | Admitting: PHYSICIAN ASSISTANT
Payer: COMMERCIAL

## 2023-08-03 DIAGNOSIS — Z3A.19 19 WEEKS GESTATION OF PREGNANCY: ICD-10-CM

## 2023-08-03 PROCEDURE — 76805 OB US >/= 14 WKS SNGL FETUS: CPT

## 2023-08-04 ENCOUNTER — NURSE TRIAGE (OUTPATIENT)
Dept: NURSING | Facility: CLINIC | Age: 28
End: 2023-08-04
Payer: COMMERCIAL

## 2023-08-04 ENCOUNTER — MYC MEDICAL ADVICE (OUTPATIENT)
Dept: FAMILY MEDICINE | Facility: CLINIC | Age: 28
End: 2023-08-04
Payer: COMMERCIAL

## 2023-08-04 DIAGNOSIS — R30.0 DYSURIA: Primary | ICD-10-CM

## 2023-08-04 NOTE — TELEPHONE ENCOUNTER
Patient calling back.     This RN gave pt the message below.     Patient verbalized understanding and had no further questions. She will go to Urgent Care now.     Radha Strickland RN  [facility] Nurse Advisor  8/4/2023 6:43 PM

## 2023-08-04 NOTE — TELEPHONE ENCOUNTER
Patient calling, she is 28 weeks pregnant, she believe she has blood in her urine as opposed to vaginal bleeding. Toilet water was bright red this morning, but when she urinates now the water is yellow when she urinates. Baby is very active. No longer feeling pain or burning with urination, but did feel some this morning.    OB Triage Call      Is patient's OB/Midwife with the [facility]? [facility]- Is patient's primary OB a Midwife? Yes- At this time we do not triage for the [facility] midwives.  Paged on-call Midwife No page due this being a urinary ptoblem at no call to contact patient directly.       Is patient's delivering hospital on divert? No      [id number]    Estimated Date of Delivery: Oct 29, 2023        OB History    Para Term  AB Living   1 0 0 0 0 0   SAB IAB Ectopic Multiple Live Births   0 0 0 0 0      # Outcome Date GA Lbr Da/2nd Weight Sex Delivery Anes PTL Lv   1 Current                No results found for: GBS       Routed to Dr. Welch as KEANU per patient request. Patient has an appointment on 8/10/2023.    Cindy Gonzalez RN 23 3:44 PM  [facility] Nurse Advisor  Reason for Disposition   All other patients with blood in urine  (Exception: Could be normal menstrual bleeding.)    Additional Information   Negative: Shock suspected (e.g., cold/pale/clammy skin, too weak to stand, low BP, rapid pulse)   Negative: Sounds like a life-threatening emergency to the triager   Negative: Urinary catheter, questions about   Negative: Recent back or abdominal injury   Negative: Recent genital injury   Negative: Unable to urinate (or only a few drops) > 4 hours and bladder feels very full (e.g., palpable bladder or strong urge to urinate)   Negative: Passing pure blood or large blood clots (i.e., larger than a dime or grape)   Negative: Fever > 100.4 F (38.0 C)   Negative: Patient sounds very sick or weak to the triager   Negative: Known sickle cell disease   Negative: Taking  Coumadin (warfarin) or other strong blood thinner, or known bleeding disorder (e.g., thrombocytopenia)   Negative: Side (flank) or back pain present   Negative: Pain or burning with passing urine (urination)   Negative: Patient wants to be seen   Negative: Pink or red-colored urine and likely from food (beets, rhubarb, red food dye) and lasts > 24 hours after stopping food    Protocols used: Urine - Blood In-A-OH

## 2023-08-04 NOTE — TELEPHONE ENCOUNTER
This patient is seeing Dr Welch for her pregnancy- not a Midwife    Pregnant and had red toilet water this morning    Sounds like no bleeding since then  Still worried about a possible UTI, vaginal bleed or kidney stone, etc    Would recommend that she present to Urgent care or to [facility] maternity today to get clinically evaluated at this point

## 2023-08-04 NOTE — TELEPHONE ENCOUNTER
Second attempt to reach patient. Left message to return call to nurse advisor line at [phone].  Ana Pelayo RN   08/04/23 6:38 PM  [facility] Nurse Advisor

## 2023-08-04 NOTE — Clinical Note
Here is an example of a message that was just forwarded to me as DOD. This is a pregnant patient expressing some bleeding possible UTI.  This should be managed by RN calling patient to get more information and triaging patient to clinic/hospital/etc and not just sitting in my LOAGhart messages.

## 2023-08-04 NOTE — TELEPHONE ENCOUNTER
Provider Recommendation Follow Up:   Unable to reach patient/caregiver. Left message to return call to [address] [place] [address] (To enter phone number for call back directly to clinic/staff member). Upon return call please notify caller of provider's recommendations.    Ana Pelayo RN   08/04/23 4:55 PM  [facility] Nurse Advisor

## 2023-08-04 NOTE — TELEPHONE ENCOUNTER
RN attempted to contact patient, but no answer. Left message on patient's voice mail to call clinic back.    If patient calls back, please direct transfer call to [facility] Triage for pregnant patient with bleeding per Dr Villasenor. Thanks    Kenia Kelly RN  [facility]    Dr Villasenor  Please triage patient- can come to leave a urine lab only if appropriate   Otherwise let me know what is going on   Pregnant patient with some bleeding /possible UTI

## 2023-08-04 NOTE — TELEPHONE ENCOUNTER
RN made 2nd attempt to contact patient, but no answer. Left message on patient's voice mail to call clinic back.   Also left voicemail on patient's emergency contacts to have patient call clinic back.    If patient calls back, please direct transfer call to [facility] Triage for pregnant patient with bleeding per Dr Villasenor. Thanks    Kenia Kelly RN  [facility]    Dr Villasenor  Please triage patient- can come to leave a urine lab only if appropriate   Otherwise let me know what is going on   Pregnant patient with some bleeding /possible UTI

## 2023-08-07 NOTE — TELEPHONE ENCOUNTER
Ana Pelayo, RN   to [facility] Nurse Triage Pool         8/4/23  6:39 PM  Second attempt at 6:35 pm. Left message for call back.    Ana Pelayo RN         8/4/23  4:55 PM  Note  Provider Recommendation Follow Up:   Unable to reach patient/caregiver. Left message to return call to [address] [place] [address] (To enter phone number for call back directly to clinic/staff member). Upon return call please notify caller of provider's recommendations.     Ana Pelayo RN          08/04/23 4:55 PM  [facility] Nurse Advisor          Jane Villa MD   to [facility] Nurse Triage Pool       8/4/23  4:46 PM  Note  This patient is seeing Dr Welch for her pregnancy- not a Midwife     Pregnant and had red toilet water this morning     Sounds like no bleeding since then  Still worried about a possible UTI, vaginal bleed or kidney stone, etc     Would recommend that she present to Urgent care or to [facility] today to get clinically evaluated at this point

## 2023-08-07 NOTE — TELEPHONE ENCOUNTER
Patient triaged and advised to go to  or [facility] on 8/4/23 to get clinically evaluated at this point.  No further action needed  Also patient has an appointment with PCP, Dr Welch on 8/10/23.    Kenia Kelly RN  [facility]

## 2023-08-10 ENCOUNTER — OFFICE VISIT (OUTPATIENT)
Dept: FAMILY MEDICINE | Facility: CLINIC | Age: 28
End: 2023-08-10
Attending: FAMILY MEDICINE
Payer: COMMERCIAL

## 2023-08-10 VITALS
TEMPERATURE: 97.3 F | SYSTOLIC BLOOD PRESSURE: 94 MMHG | WEIGHT: 130 LBS | RESPIRATION RATE: 16 BRPM | BODY MASS INDEX: 24.7 KG/M2 | HEART RATE: 80 BPM | DIASTOLIC BLOOD PRESSURE: 58 MMHG | OXYGEN SATURATION: 99 %

## 2023-08-10 DIAGNOSIS — Z34.00 SUPERVISION OF NORMAL FIRST PREGNANCY, ANTEPARTUM: Primary | ICD-10-CM

## 2023-08-10 LAB
ALBUMIN UR-MCNC: NEGATIVE MG/DL
GLUCOSE 1H P 50 G GLC PO SERPL-MCNC: 129 MG/DL (ref 70–129)
GLUCOSE UR STRIP-MCNC: NEGATIVE MG/DL
HGB BLD-MCNC: 10.4 G/DL (ref 11.7–15.7)
KETONES UR STRIP-MCNC: NEGATIVE MG/DL
T PALLIDUM AB SER QL: NONREACTIVE

## 2023-08-10 PROCEDURE — 36415 COLL VENOUS BLD VENIPUNCTURE: CPT | Performed by: FAMILY MEDICINE

## 2023-08-10 PROCEDURE — 99207 PR PRENATAL VISIT: CPT | Performed by: FAMILY MEDICINE

## 2023-08-10 PROCEDURE — 82950 GLUCOSE TEST: CPT | Performed by: FAMILY MEDICINE

## 2023-08-10 PROCEDURE — 86780 TREPONEMA PALLIDUM: CPT | Performed by: FAMILY MEDICINE

## 2023-08-10 PROCEDURE — 81003 URINALYSIS AUTO W/O SCOPE: CPT | Performed by: FAMILY MEDICINE

## 2023-08-15 ENCOUNTER — APPOINTMENT (OUTPATIENT)
Dept: ULTRASOUND IMAGING | Facility: HOSPITAL | Age: 28
End: 2023-08-15
Attending: FAMILY MEDICINE
Payer: COMMERCIAL

## 2023-08-15 ENCOUNTER — HOSPITAL ENCOUNTER (OUTPATIENT)
Facility: HOSPITAL | Age: 28
Discharge: HOME | End: 2023-08-15
Attending: FAMILY MEDICINE | Admitting: FAMILY MEDICINE
Payer: COMMERCIAL

## 2023-08-15 ENCOUNTER — HOSPITAL ENCOUNTER (EMERGENCY)
Facility: HOSPITAL | Age: 28
End: 2023-08-15
Payer: COMMERCIAL

## 2023-08-15 ENCOUNTER — HOSPITAL ENCOUNTER (OUTPATIENT)
Facility: HOSPITAL | Age: 28
End: 2023-08-15
Admitting: FAMILY MEDICINE
Payer: COMMERCIAL

## 2023-08-15 VITALS — DIASTOLIC BLOOD PRESSURE: 61 MMHG | SYSTOLIC BLOOD PRESSURE: 105 MMHG

## 2023-08-15 PROBLEM — Z36.89 ENCOUNTER FOR TRIAGE IN PREGNANT PATIENT: Status: ACTIVE | Noted: 2023-08-15

## 2023-08-15 LAB
ALBUMIN UR-MCNC: 30 MG/DL
APPEARANCE UR: ABNORMAL
BILIRUB UR QL STRIP: NEGATIVE
CLUE CELLS: ABNORMAL
COLOR UR AUTO: ABNORMAL
GLUCOSE UR STRIP-MCNC: NEGATIVE MG/DL
HGB UR QL STRIP: ABNORMAL
KETONES UR STRIP-MCNC: NEGATIVE MG/DL
LEUKOCYTE ESTERASE UR QL STRIP: NEGATIVE
MUCOUS THREADS #/AREA URNS LPF: PRESENT /LPF
NITRATE UR QL: NEGATIVE
PH UR STRIP: 6.5 [PH] (ref 5–7)
RBC URINE: >182 /HPF
SP GR UR STRIP: 1.02 (ref 1–1.03)
SQUAMOUS EPITHELIAL: 5 /HPF
TRICHOMONAS, WET PREP: ABNORMAL
UROBILINOGEN UR STRIP-MCNC: <2 MG/DL
WBC URINE: 0 /HPF
WBC'S/HIGH POWER FIELD, WET PREP: ABNORMAL
YEAST, WET PREP: ABNORMAL

## 2023-08-15 PROCEDURE — G0463 HOSPITAL OUTPT CLINIC VISIT: HCPCS | Mod: 25

## 2023-08-15 PROCEDURE — 81001 URINALYSIS AUTO W/SCOPE: CPT | Performed by: FAMILY MEDICINE

## 2023-08-15 PROCEDURE — 87210 SMEAR WET MOUNT SALINE/INK: CPT | Performed by: FAMILY MEDICINE

## 2023-08-15 PROCEDURE — 76819 FETAL BIOPHYS PROFIL W/O NST: CPT

## 2023-08-15 RX ORDER — LIDOCAINE 40 MG/G
CREAM TOPICAL
Status: DISCONTINUED | OUTPATIENT
Start: 2023-08-15 | End: 2023-08-16 | Stop reason: HOSPADM

## 2023-08-15 ASSESSMENT — ACTIVITIES OF DAILY LIVING (ADL)
ADLS_ACUITY_SCORE: 35
ADLS_ACUITY_SCORE: 35

## 2023-08-16 NOTE — DISCHARGE INSTRUCTIONS
Discharge Instruction for Undelivered Patients      You were seen for:  bleeding  We Consulted: Yuri  You had (Test or Medicine):BPP, NST, wet prep, UA     Diet:   Drink 8 to 12 glasses of liquids (milk, juice, water) every day.  You may eat meals and snacks.     Activity:  Count fetal kicks everyday (see handout)  Call your doctor or nurse midwife if your baby is moving less than usual.     Call your provider if you notice:  Swelling in your face or increased swelling in your hands or legs.  Headaches that are not relieved by Tylenol (acetaminophen).  Changes in your vision (blurring: seeing spots or stars.)  Nausea (sick to your stomach) and vomiting (throwing up).   Weight gain of 5 pounds or more per week.  Heartburn that doesn't go away.  Signs of bladder infection: pain when you urinate (use the toilet), need to go more often and more urgently.  The bag of acosta (rupture of membranes) breaks, or you notice leaking in your underwear.  Bright red blood in your underwear.  Abdominal (lower belly) or stomach pain.  For first baby: Contractions (tightening) less than 5 minutes apart for one hour or more.  Second (plus) baby: Contractions (tightening) less than 10 minutes apart and getting stronger.  *If less than 34 weeks: Contractions (tightening) more than 6 times in one hour.  Increase or change in vaginal discharge (note the color and amount)  Other: Follow up sooner in clinic if symptoms don't resolve.     Follow-up:  As scheduled in the clinic

## 2023-08-16 NOTE — PROGRESS NOTES
"Patient arrive to [facility]   at 29.2 weeks gestation for vaginal bleeding. Patient reports \"light period bleeding\" around 1945 and old blood continues with wiping. VSS. Patient denies intercourse or falls. Patient denies LOF and reports +fetal movement. Call to Dr. Welch to notify of arrival and for orders. VORB for NST, BPP, and UA. Will call back with update.   "

## 2023-08-16 NOTE — PROGRESS NOTES
Discharge order received from Dr. Welch. Patient comfortable discharging and discharge instructions reviewed. Will follow up in clinic. Discharge to home at this time.

## 2023-08-30 ENCOUNTER — PRENATAL OFFICE VISIT (OUTPATIENT)
Dept: FAMILY MEDICINE | Facility: CLINIC | Age: 28
End: 2023-08-30
Payer: COMMERCIAL

## 2023-08-30 VITALS
BODY MASS INDEX: 24.89 KG/M2 | HEART RATE: 94 BPM | DIASTOLIC BLOOD PRESSURE: 58 MMHG | SYSTOLIC BLOOD PRESSURE: 99 MMHG | WEIGHT: 131 LBS | RESPIRATION RATE: 16 BRPM | OXYGEN SATURATION: 98 % | TEMPERATURE: 98.2 F

## 2023-08-30 DIAGNOSIS — R31.9 HEMATURIA, UNSPECIFIED TYPE: ICD-10-CM

## 2023-08-30 DIAGNOSIS — Z34.00 SUPERVISION OF NORMAL FIRST PREGNANCY, ANTEPARTUM: Primary | ICD-10-CM

## 2023-08-30 PROCEDURE — 99207 PR PRENATAL VISIT: CPT | Performed by: FAMILY MEDICINE

## 2023-08-30 NOTE — PROGRESS NOTES
Seen on labor and delivery two weeks ago.   She had spotting or blood in the urine.   BPP was normal.   Urinalysis did show blood.   She follows up today.   The blood in the urine does not seem to be there any more. She had no pain. No vaginal bleeding. Much FM. No lof, ctx.   Recheck urine today.   Discussed reasons to be seen urgently. EHR noted with possible renal stones in 2019 but CT was negative. Will monitor this closely.   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 Will try to increase her fluids, rest as needed, call if she needs any time off or decreased work hours.

## 2023-09-01 ENCOUNTER — PRENATAL OFFICE VISIT (OUTPATIENT)
Dept: FAMILY MEDICINE | Facility: CLINIC | Age: 28
End: 2023-09-01
Payer: COMMERCIAL

## 2023-09-01 VITALS
HEART RATE: 67 BPM | OXYGEN SATURATION: 98 % | TEMPERATURE: 97.6 F | HEIGHT: 58 IN | DIASTOLIC BLOOD PRESSURE: 61 MMHG | BODY MASS INDEX: 27.08 KG/M2 | SYSTOLIC BLOOD PRESSURE: 98 MMHG | WEIGHT: 129 LBS | RESPIRATION RATE: 16 BRPM

## 2023-09-01 DIAGNOSIS — Z34.90 ENCOUNTER FOR SUPERVISION OF NORMAL PREGNANCY, ANTEPARTUM, UNSPECIFIED GRAVIDITY: Primary | ICD-10-CM

## 2023-09-01 DIAGNOSIS — D50.8 IRON DEFICIENCY ANEMIA SECONDARY TO INADEQUATE DIETARY IRON INTAKE: ICD-10-CM

## 2023-09-01 DIAGNOSIS — O09.30 LATE PRENATAL CARE: ICD-10-CM

## 2023-09-01 PROCEDURE — 99207 PR PRENATAL VISIT: CPT | Performed by: FAMILY MEDICINE

## 2023-09-01 PROCEDURE — 81003 URINALYSIS AUTO W/O SCOPE: CPT | Performed by: FAMILY MEDICINE

## 2023-09-01 RX ORDER — FERROUS GLUCONATE 324(38)MG
324 TABLET ORAL
Qty: 30 TABLET | Refills: 3 | Status: ON HOLD | OUTPATIENT
Start: 2023-09-01 | End: 2023-12-04

## 2023-09-04 NOTE — PROGRESS NOTES
First OB.  Feeling well. No ctx, lof, bleeding. +FM.  Will do one hour glucose at the next visit.   Reviewed dating, a little uncertain due to 20 + weeks and she is not sure about her LMP.   Its a girl.   Follow up in four weeks.

## 2023-09-13 ENCOUNTER — PRENATAL OFFICE VISIT (OUTPATIENT)
Dept: FAMILY MEDICINE | Facility: CLINIC | Age: 28
End: 2023-09-13
Payer: COMMERCIAL

## 2023-09-13 VITALS
RESPIRATION RATE: 16 BRPM | TEMPERATURE: 97.8 F | WEIGHT: 135 LBS | BODY MASS INDEX: 25.65 KG/M2 | HEART RATE: 89 BPM | OXYGEN SATURATION: 96 % | SYSTOLIC BLOOD PRESSURE: 102 MMHG | DIASTOLIC BLOOD PRESSURE: 66 MMHG

## 2023-09-13 DIAGNOSIS — Z34.00 SUPERVISION OF NORMAL FIRST PREGNANCY, ANTEPARTUM: Primary | ICD-10-CM

## 2023-09-13 DIAGNOSIS — D50.9 IRON DEFICIENCY ANEMIA, UNSPECIFIED IRON DEFICIENCY ANEMIA TYPE: ICD-10-CM

## 2023-09-13 PROCEDURE — 99207 PR PRENATAL VISIT: CPT | Performed by: FAMILY MEDICINE

## 2023-09-13 PROCEDURE — 90471 IMMUNIZATION ADMIN: CPT | Performed by: FAMILY MEDICINE

## 2023-09-13 PROCEDURE — 90715 TDAP VACCINE 7 YRS/> IM: CPT | Performed by: FAMILY MEDICINE

## 2023-09-13 PROCEDURE — 81003 URINALYSIS AUTO W/O SCOPE: CPT | Performed by: FAMILY MEDICINE

## 2023-09-13 NOTE — PROGRESS NOTES
Feeling well. No ctx, lof, bleeding. +FM.   Tdap today.   Still planning on working until labor.   Planning natural birth.   Bottle feeding.   GBS next visit.

## 2023-09-17 ENCOUNTER — HEALTH MAINTENANCE LETTER (OUTPATIENT)
Age: 28
End: 2023-09-17

## 2023-10-03 ENCOUNTER — PRENATAL OFFICE VISIT (OUTPATIENT)
Dept: FAMILY MEDICINE | Facility: CLINIC | Age: 28
End: 2023-10-03
Payer: COMMERCIAL

## 2023-10-03 VITALS
TEMPERATURE: 97.8 F | BODY MASS INDEX: 26.7 KG/M2 | WEIGHT: 136 LBS | RESPIRATION RATE: 14 BRPM | HEART RATE: 85 BPM | DIASTOLIC BLOOD PRESSURE: 54 MMHG | SYSTOLIC BLOOD PRESSURE: 98 MMHG | OXYGEN SATURATION: 96 % | HEIGHT: 60 IN

## 2023-10-03 VITALS
WEIGHT: 128 LBS | TEMPERATURE: 97.9 F | HEART RATE: 65 BPM | RESPIRATION RATE: 16 BRPM | HEIGHT: 58 IN | OXYGEN SATURATION: 99 % | DIASTOLIC BLOOD PRESSURE: 60 MMHG | SYSTOLIC BLOOD PRESSURE: 100 MMHG | BODY MASS INDEX: 26.87 KG/M2

## 2023-10-03 DIAGNOSIS — O09.30 LATE PRENATAL CARE: ICD-10-CM

## 2023-10-03 DIAGNOSIS — Z34.00 SUPERVISION OF NORMAL FIRST PREGNANCY, ANTEPARTUM: Primary | ICD-10-CM

## 2023-10-03 DIAGNOSIS — Z34.90 ENCOUNTER FOR SUPERVISION OF NORMAL PREGNANCY, ANTEPARTUM, UNSPECIFIED GRAVIDITY: Primary | ICD-10-CM

## 2023-10-03 DIAGNOSIS — D50.8 IRON DEFICIENCY ANEMIA SECONDARY TO INADEQUATE DIETARY IRON INTAKE: ICD-10-CM

## 2023-10-03 LAB
ALBUMIN UR-MCNC: 30 MG/DL
ALBUMIN UR-MCNC: NEGATIVE MG/DL
GLUCOSE 1H P 50 G GLC PO SERPL-MCNC: 100 MG/DL (ref 70–129)
GLUCOSE UR STRIP-MCNC: NEGATIVE MG/DL
GLUCOSE UR STRIP-MCNC: NEGATIVE MG/DL
HGB BLD-MCNC: 10.1 G/DL (ref 11.7–15.7)
KETONES UR STRIP-MCNC: 15 MG/DL
KETONES UR STRIP-MCNC: NEGATIVE MG/DL
T PALLIDUM AB SER QL: NONREACTIVE

## 2023-10-03 PROCEDURE — 86780 TREPONEMA PALLIDUM: CPT | Performed by: FAMILY MEDICINE

## 2023-10-03 PROCEDURE — 82950 GLUCOSE TEST: CPT | Performed by: FAMILY MEDICINE

## 2023-10-03 PROCEDURE — 99207 PR PRENATAL VISIT: CPT | Performed by: FAMILY MEDICINE

## 2023-10-03 PROCEDURE — 90686 IIV4 VACC NO PRSV 0.5 ML IM: CPT | Performed by: FAMILY MEDICINE

## 2023-10-03 PROCEDURE — 81003 URINALYSIS AUTO W/O SCOPE: CPT | Performed by: FAMILY MEDICINE

## 2023-10-03 PROCEDURE — 90471 IMMUNIZATION ADMIN: CPT | Performed by: FAMILY MEDICINE

## 2023-10-03 PROCEDURE — 87653 STREP B DNA AMP PROBE: CPT | Performed by: FAMILY MEDICINE

## 2023-10-03 PROCEDURE — 36415 COLL VENOUS BLD VENIPUNCTURE: CPT | Performed by: FAMILY MEDICINE

## 2023-10-04 LAB — GP B STREP DNA SPEC QL NAA+PROBE: NEGATIVE

## 2023-10-04 NOTE — PROGRESS NOTES
Feeling well. No ctx, lof, bleeding. +FM.   GBS collected.   Gets influenza at work? Will check next week.

## 2023-10-04 NOTE — PROGRESS NOTES
No ctx, lof, bleeding. +FM.   One hour today.   Taking iron supplement.   Follow up in two weeks.

## 2023-10-10 ENCOUNTER — PRENATAL OFFICE VISIT (OUTPATIENT)
Dept: FAMILY MEDICINE | Facility: CLINIC | Age: 28
End: 2023-10-10
Payer: COMMERCIAL

## 2023-10-10 VITALS
RESPIRATION RATE: 16 BRPM | TEMPERATURE: 97.6 F | HEART RATE: 80 BPM | BODY MASS INDEX: 26.9 KG/M2 | SYSTOLIC BLOOD PRESSURE: 98 MMHG | WEIGHT: 137 LBS | HEIGHT: 60 IN | DIASTOLIC BLOOD PRESSURE: 56 MMHG | OXYGEN SATURATION: 98 %

## 2023-10-10 DIAGNOSIS — Z34.00 SUPERVISION OF NORMAL FIRST PREGNANCY, ANTEPARTUM: Primary | ICD-10-CM

## 2023-10-10 LAB
ALBUMIN UR-MCNC: ABNORMAL MG/DL
ALBUMIN UR-MCNC: ABNORMAL MG/DL
APPEARANCE UR: CLEAR
BACTERIA #/AREA URNS HPF: ABNORMAL /HPF
BILIRUB UR QL STRIP: NEGATIVE
COLOR UR AUTO: YELLOW
GLUCOSE UR STRIP-MCNC: NEGATIVE MG/DL
GLUCOSE UR STRIP-MCNC: NEGATIVE MG/DL
HGB UR QL STRIP: ABNORMAL
KETONES UR STRIP-MCNC: NEGATIVE MG/DL
KETONES UR STRIP-MCNC: NEGATIVE MG/DL
LEUKOCYTE ESTERASE UR QL STRIP: NEGATIVE
NITRATE UR QL: NEGATIVE
PH UR STRIP: 7 [PH] (ref 5–8)
RBC #/AREA URNS AUTO: ABNORMAL /HPF
SP GR UR STRIP: 1.02 (ref 1–1.03)
SQUAMOUS #/AREA URNS AUTO: ABNORMAL /LPF
UROBILINOGEN UR STRIP-ACNC: 1 E.U./DL
WBC #/AREA URNS AUTO: ABNORMAL /HPF

## 2023-10-10 PROCEDURE — 99207 PR PRENATAL VISIT: CPT | Performed by: FAMILY MEDICINE

## 2023-10-10 PROCEDURE — 81003 URINALYSIS AUTO W/O SCOPE: CPT | Performed by: FAMILY MEDICINE

## 2023-10-10 NOTE — PROGRESS NOTES
Some ctx but not regular. No lof, bleeding. +FM.   CE as above.  Reviewed warning signs.   Follow planned in one week.

## 2023-10-17 ENCOUNTER — PRENATAL OFFICE VISIT (OUTPATIENT)
Dept: FAMILY MEDICINE | Facility: CLINIC | Age: 28
End: 2023-10-17
Payer: COMMERCIAL

## 2023-10-17 VITALS
HEART RATE: 70 BPM | WEIGHT: 138 LBS | BODY MASS INDEX: 27.09 KG/M2 | DIASTOLIC BLOOD PRESSURE: 60 MMHG | RESPIRATION RATE: 21 BRPM | OXYGEN SATURATION: 100 % | TEMPERATURE: 97.4 F | SYSTOLIC BLOOD PRESSURE: 98 MMHG | HEIGHT: 60 IN

## 2023-10-17 DIAGNOSIS — Z34.00 SUPERVISION OF NORMAL FIRST PREGNANCY, ANTEPARTUM: Primary | ICD-10-CM

## 2023-10-17 PROCEDURE — 81003 URINALYSIS AUTO W/O SCOPE: CPT | Performed by: FAMILY MEDICINE

## 2023-10-17 PROCEDURE — 99207 PR PRENATAL VISIT: CPT | Performed by: FAMILY MEDICINE

## 2023-10-17 NOTE — PROGRESS NOTES
Feeling a lot of denilson tamayo, pressure when she is on the toilet especially. No lof. No bleeding. +FM. Still working at [employer], light duties.   Labor precautions.   Has follow up planned in one week.

## 2023-10-18 ENCOUNTER — PRENATAL OFFICE VISIT (OUTPATIENT)
Dept: FAMILY MEDICINE | Facility: CLINIC | Age: 28
End: 2023-10-18
Payer: COMMERCIAL

## 2023-10-18 VITALS
BODY MASS INDEX: 27.33 KG/M2 | OXYGEN SATURATION: 97 % | TEMPERATURE: 97.6 F | WEIGHT: 132 LBS | SYSTOLIC BLOOD PRESSURE: 94 MMHG | RESPIRATION RATE: 21 BRPM | HEART RATE: 70 BPM | DIASTOLIC BLOOD PRESSURE: 52 MMHG

## 2023-10-18 DIAGNOSIS — Z34.90 ENCOUNTER FOR SUPERVISION OF NORMAL PREGNANCY, ANTEPARTUM, UNSPECIFIED GRAVIDITY: Primary | ICD-10-CM

## 2023-10-18 DIAGNOSIS — D50.8 IRON DEFICIENCY ANEMIA SECONDARY TO INADEQUATE DIETARY IRON INTAKE: ICD-10-CM

## 2023-10-18 DIAGNOSIS — O09.30 LATE PRENATAL CARE: ICD-10-CM

## 2023-10-18 PROCEDURE — 81003 URINALYSIS AUTO W/O SCOPE: CPT | Performed by: FAMILY MEDICINE

## 2023-10-18 PROCEDURE — 90471 IMMUNIZATION ADMIN: CPT | Performed by: FAMILY MEDICINE

## 2023-10-18 PROCEDURE — 99207 PR PRENATAL VISIT: CPT | Performed by: FAMILY MEDICINE

## 2023-10-18 PROCEDURE — 90715 TDAP VACCINE 7 YRS/> IM: CPT | Performed by: FAMILY MEDICINE

## 2023-10-24 ENCOUNTER — PRENATAL OFFICE VISIT (OUTPATIENT)
Dept: FAMILY MEDICINE | Facility: CLINIC | Age: 28
End: 2023-10-24
Payer: COMMERCIAL

## 2023-10-24 VITALS
DIASTOLIC BLOOD PRESSURE: 62 MMHG | WEIGHT: 136 LBS | RESPIRATION RATE: 21 BRPM | SYSTOLIC BLOOD PRESSURE: 103 MMHG | OXYGEN SATURATION: 99 % | HEART RATE: 90 BPM | TEMPERATURE: 97.6 F | BODY MASS INDEX: 26.7 KG/M2

## 2023-10-24 DIAGNOSIS — D50.9 IRON DEFICIENCY ANEMIA, UNSPECIFIED IRON DEFICIENCY ANEMIA TYPE: ICD-10-CM

## 2023-10-24 DIAGNOSIS — Z34.00 SUPERVISION OF NORMAL FIRST PREGNANCY, ANTEPARTUM: Primary | ICD-10-CM

## 2023-10-24 PROCEDURE — 81003 URINALYSIS AUTO W/O SCOPE: CPT | Performed by: FAMILY MEDICINE

## 2023-10-24 PROCEDURE — 99207 PR PRENATAL VISIT: CPT | Performed by: FAMILY MEDICINE

## 2023-10-24 NOTE — PROGRESS NOTES
Has been using the ball for seating. No ctx, lof, bleeding. +FM. Anxious for delivery.   CE as above.   Cervix not favorable but consider return this week for recheck, consider induction if able.

## 2023-10-31 ENCOUNTER — PRENATAL OFFICE VISIT (OUTPATIENT)
Dept: FAMILY MEDICINE | Facility: CLINIC | Age: 28
End: 2023-10-31
Payer: COMMERCIAL

## 2023-10-31 ENCOUNTER — HOSPITAL ENCOUNTER (INPATIENT)
Facility: HOSPITAL | Age: 28
LOS: 1 days | Discharge: HOME | End: 2023-11-02
Attending: FAMILY MEDICINE | Admitting: FAMILY MEDICINE
Payer: COMMERCIAL

## 2023-10-31 VITALS
HEART RATE: 93 BPM | WEIGHT: 140 LBS | SYSTOLIC BLOOD PRESSURE: 101 MMHG | TEMPERATURE: 96.9 F | BODY MASS INDEX: 27.49 KG/M2 | DIASTOLIC BLOOD PRESSURE: 66 MMHG | OXYGEN SATURATION: 97 % | RESPIRATION RATE: 21 BRPM

## 2023-10-31 DIAGNOSIS — D50.9 IRON DEFICIENCY ANEMIA, UNSPECIFIED IRON DEFICIENCY ANEMIA TYPE: ICD-10-CM

## 2023-10-31 DIAGNOSIS — Z34.00 SUPERVISION OF NORMAL FIRST PREGNANCY, ANTEPARTUM: Primary | ICD-10-CM

## 2023-10-31 PROCEDURE — 81003 URINALYSIS AUTO W/O SCOPE: CPT | Performed by: FAMILY MEDICINE

## 2023-10-31 PROCEDURE — 99207 PR PRENATAL VISIT: CPT | Performed by: FAMILY MEDICINE

## 2023-10-31 NOTE — PROGRESS NOTES
Feeling. Stopped work already. Still no contractions. No lof, no bleeding. +FM.     CE as above. Discussed options of induction of labor, thinking about this Thursday or Friday.   Labor precautions.

## 2023-11-01 ENCOUNTER — ANESTHESIA EVENT (OUTPATIENT)
Dept: OBGYN | Facility: HOSPITAL | Age: 28
End: 2023-11-01
Payer: COMMERCIAL

## 2023-11-01 ENCOUNTER — ANESTHESIA (OUTPATIENT)
Dept: OBGYN | Facility: HOSPITAL | Age: 28
End: 2023-11-01
Payer: COMMERCIAL

## 2023-11-01 PROBLEM — Z34.90 PREGNANT: Status: ACTIVE | Noted: 2023-11-01

## 2023-11-01 LAB
ABO/RH(D): NORMAL
ANTIBODY SCREEN: NEGATIVE
HGB BLD-MCNC: 10.5 G/DL (ref 11.7–15.7)
SPECIMEN EXPIRATION DATE: NORMAL
T PALLIDUM AB SER QL: NONREACTIVE

## 2023-11-01 PROCEDURE — 0KQM0ZZ REPAIR PERINEUM MUSCLE, OPEN APPROACH: ICD-10-PCS | Performed by: FAMILY MEDICINE

## 2023-11-01 PROCEDURE — 10907ZC DRAINAGE OF AMNIOTIC FLUID, THERAPEUTIC FROM PRODUCTS OF CONCEPTION, VIA NATURAL OR ARTIFICIAL OPENING: ICD-10-PCS | Performed by: FAMILY MEDICINE

## 2023-11-01 PROCEDURE — 250N000009 HC RX 250: Performed by: FAMILY MEDICINE

## 2023-11-01 PROCEDURE — 00HU33Z INSERTION OF INFUSION DEVICE INTO SPINAL CANAL, PERCUTANEOUS APPROACH: ICD-10-PCS | Performed by: ANESTHESIOLOGY

## 2023-11-01 PROCEDURE — 86780 TREPONEMA PALLIDUM: CPT | Performed by: FAMILY MEDICINE

## 2023-11-01 PROCEDURE — 36415 COLL VENOUS BLD VENIPUNCTURE: CPT | Performed by: FAMILY MEDICINE

## 2023-11-01 PROCEDURE — 3E0R3BZ INTRODUCTION OF ANESTHETIC AGENT INTO SPINAL CANAL, PERCUTANEOUS APPROACH: ICD-10-PCS | Performed by: ANESTHESIOLOGY

## 2023-11-01 PROCEDURE — 250N000011 HC RX IP 250 OP 636: Performed by: FAMILY MEDICINE

## 2023-11-01 PROCEDURE — 120N000001 HC R&B MED SURG/OB

## 2023-11-01 PROCEDURE — 86850 RBC ANTIBODY SCREEN: CPT | Performed by: FAMILY MEDICINE

## 2023-11-01 PROCEDURE — 722N000001 HC LABOR CARE VAGINAL DELIVERY SINGLE

## 2023-11-01 PROCEDURE — 250N000011 HC RX IP 250 OP 636: Mod: JZ | Performed by: ANESTHESIOLOGY

## 2023-11-01 PROCEDURE — 250N000011 HC RX IP 250 OP 636: Performed by: ANESTHESIOLOGY

## 2023-11-01 PROCEDURE — 85018 HEMOGLOBIN: CPT | Performed by: FAMILY MEDICINE

## 2023-11-01 PROCEDURE — 258N000003 HC RX IP 258 OP 636: Performed by: FAMILY MEDICINE

## 2023-11-01 PROCEDURE — 86901 BLOOD TYPING SEROLOGIC RH(D): CPT | Performed by: FAMILY MEDICINE

## 2023-11-01 PROCEDURE — 370N000003 HC ANESTHESIA WARD SERVICE: Performed by: ANESTHESIOLOGY

## 2023-11-01 PROCEDURE — 59400 OBSTETRICAL CARE: CPT | Performed by: FAMILY MEDICINE

## 2023-11-01 RX ORDER — FERROUS GLUCONATE 324(38)MG
324 TABLET ORAL
Status: DISCONTINUED | OUTPATIENT
Start: 2023-11-02 | End: 2023-11-02 | Stop reason: HOSPADM

## 2023-11-01 RX ORDER — METOCLOPRAMIDE 10 MG/1
10 TABLET ORAL EVERY 6 HOURS PRN
Status: DISCONTINUED | OUTPATIENT
Start: 2023-11-01 | End: 2023-11-01 | Stop reason: HOSPADM

## 2023-11-01 RX ORDER — ONDANSETRON 2 MG/ML
4 INJECTION INTRAMUSCULAR; INTRAVENOUS EVERY 6 HOURS PRN
Status: DISCONTINUED | OUTPATIENT
Start: 2023-11-01 | End: 2023-11-01 | Stop reason: HOSPADM

## 2023-11-01 RX ORDER — MISOPROSTOL 200 UG/1
400 TABLET ORAL
Status: DISCONTINUED | OUTPATIENT
Start: 2023-11-01 | End: 2023-11-02 | Stop reason: HOSPADM

## 2023-11-01 RX ORDER — PROCHLORPERAZINE MALEATE 10 MG
10 TABLET ORAL EVERY 6 HOURS PRN
Status: DISCONTINUED | OUTPATIENT
Start: 2023-11-01 | End: 2023-11-01 | Stop reason: HOSPADM

## 2023-11-01 RX ORDER — MISOPROSTOL 200 UG/1
400 TABLET ORAL
Status: DISCONTINUED | OUTPATIENT
Start: 2023-11-01 | End: 2023-11-01 | Stop reason: HOSPADM

## 2023-11-01 RX ORDER — OXYTOCIN/0.9 % SODIUM CHLORIDE 30/500 ML
1-24 PLASTIC BAG, INJECTION (ML) INTRAVENOUS CONTINUOUS
Status: DISCONTINUED | OUTPATIENT
Start: 2023-11-01 | End: 2023-11-01 | Stop reason: HOSPADM

## 2023-11-01 RX ORDER — DOCUSATE SODIUM 100 MG/1
100 CAPSULE, LIQUID FILLED ORAL DAILY
Status: DISCONTINUED | OUTPATIENT
Start: 2023-11-01 | End: 2023-11-02 | Stop reason: HOSPADM

## 2023-11-01 RX ORDER — NALOXONE HYDROCHLORIDE 0.4 MG/ML
0.2 INJECTION, SOLUTION INTRAMUSCULAR; INTRAVENOUS; SUBCUTANEOUS
Status: DISCONTINUED | OUTPATIENT
Start: 2023-11-01 | End: 2023-11-01 | Stop reason: HOSPADM

## 2023-11-01 RX ORDER — MISOPROSTOL 200 UG/1
800 TABLET ORAL
Status: DISCONTINUED | OUTPATIENT
Start: 2023-11-01 | End: 2023-11-01 | Stop reason: HOSPADM

## 2023-11-01 RX ORDER — FERROUS GLUCONATE 324(38)MG
324 TABLET ORAL
COMMUNITY
Start: 2023-09-01

## 2023-11-01 RX ORDER — CARBOPROST TROMETHAMINE 250 UG/ML
250 INJECTION, SOLUTION INTRAMUSCULAR
Status: DISCONTINUED | OUTPATIENT
Start: 2023-11-01 | End: 2023-11-01 | Stop reason: HOSPADM

## 2023-11-01 RX ORDER — OXYTOCIN/0.9 % SODIUM CHLORIDE 30/500 ML
340 PLASTIC BAG, INJECTION (ML) INTRAVENOUS CONTINUOUS PRN
Status: DISCONTINUED | OUTPATIENT
Start: 2023-11-01 | End: 2023-11-02 | Stop reason: HOSPADM

## 2023-11-01 RX ORDER — CARBOPROST TROMETHAMINE 250 UG/ML
250 INJECTION, SOLUTION INTRAMUSCULAR
Status: DISCONTINUED | OUTPATIENT
Start: 2023-11-01 | End: 2023-11-02 | Stop reason: HOSPADM

## 2023-11-01 RX ORDER — IBUPROFEN 800 MG/1
800 TABLET, FILM COATED ORAL EVERY 6 HOURS PRN
Status: DISCONTINUED | OUTPATIENT
Start: 2023-11-01 | End: 2023-11-02 | Stop reason: HOSPADM

## 2023-11-01 RX ORDER — HYDROXYZINE HYDROCHLORIDE 50 MG/1
50 TABLET, FILM COATED ORAL EVERY 6 HOURS PRN
Status: DISCONTINUED | OUTPATIENT
Start: 2023-11-01 | End: 2023-11-01 | Stop reason: HOSPADM

## 2023-11-01 RX ORDER — METOCLOPRAMIDE HYDROCHLORIDE 5 MG/ML
10 INJECTION INTRAMUSCULAR; INTRAVENOUS EVERY 6 HOURS PRN
Status: DISCONTINUED | OUTPATIENT
Start: 2023-11-01 | End: 2023-11-01 | Stop reason: HOSPADM

## 2023-11-01 RX ORDER — LIDOCAINE 40 MG/G
CREAM TOPICAL
Status: DISCONTINUED | OUTPATIENT
Start: 2023-11-01 | End: 2023-11-01 | Stop reason: HOSPADM

## 2023-11-01 RX ORDER — PROCHLORPERAZINE 25 MG
25 SUPPOSITORY, RECTAL RECTAL EVERY 12 HOURS PRN
Status: DISCONTINUED | OUTPATIENT
Start: 2023-11-01 | End: 2023-11-01 | Stop reason: HOSPADM

## 2023-11-01 RX ORDER — METHYLERGONOVINE MALEATE 0.2 MG/ML
200 INJECTION INTRAVENOUS
Status: DISCONTINUED | OUTPATIENT
Start: 2023-11-01 | End: 2023-11-02 | Stop reason: HOSPADM

## 2023-11-01 RX ORDER — OXYTOCIN/0.9 % SODIUM CHLORIDE 30/500 ML
100-340 PLASTIC BAG, INJECTION (ML) INTRAVENOUS CONTINUOUS PRN
Status: DISCONTINUED | OUTPATIENT
Start: 2023-11-01 | End: 2023-11-02 | Stop reason: HOSPADM

## 2023-11-01 RX ORDER — OXYTOCIN 10 [USP'U]/ML
10 INJECTION, SOLUTION INTRAMUSCULAR; INTRAVENOUS
Status: DISCONTINUED | OUTPATIENT
Start: 2023-11-01 | End: 2023-11-01 | Stop reason: HOSPADM

## 2023-11-01 RX ORDER — OXYTOCIN/0.9 % SODIUM CHLORIDE 30/500 ML
340 PLASTIC BAG, INJECTION (ML) INTRAVENOUS CONTINUOUS PRN
Status: DISCONTINUED | OUTPATIENT
Start: 2023-11-01 | End: 2023-11-01 | Stop reason: HOSPADM

## 2023-11-01 RX ORDER — KETOROLAC TROMETHAMINE 30 MG/ML
30 INJECTION, SOLUTION INTRAMUSCULAR; INTRAVENOUS
Status: DISCONTINUED | OUTPATIENT
Start: 2023-11-01 | End: 2023-11-02 | Stop reason: HOSPADM

## 2023-11-01 RX ORDER — PRENATAL VIT/IRON FUM/FOLIC AC 27MG-0.8MG
1 TABLET ORAL DAILY
Status: DISCONTINUED | OUTPATIENT
Start: 2023-11-01 | End: 2023-11-02 | Stop reason: HOSPADM

## 2023-11-01 RX ORDER — HYDROCORTISONE 25 MG/G
CREAM TOPICAL 3 TIMES DAILY PRN
Status: DISCONTINUED | OUTPATIENT
Start: 2023-11-01 | End: 2023-11-02 | Stop reason: HOSPADM

## 2023-11-01 RX ORDER — MODIFIED LANOLIN
OINTMENT (GRAM) TOPICAL
Status: DISCONTINUED | OUTPATIENT
Start: 2023-11-01 | End: 2023-11-02 | Stop reason: HOSPADM

## 2023-11-01 RX ORDER — FENTANYL CITRATE 50 UG/ML
50 INJECTION, SOLUTION INTRAMUSCULAR; INTRAVENOUS EVERY 30 MIN PRN
Status: DISCONTINUED | OUTPATIENT
Start: 2023-11-01 | End: 2023-11-01 | Stop reason: HOSPADM

## 2023-11-01 RX ORDER — MISOPROSTOL 200 UG/1
800 TABLET ORAL
Status: DISCONTINUED | OUTPATIENT
Start: 2023-11-01 | End: 2023-11-02 | Stop reason: HOSPADM

## 2023-11-01 RX ORDER — BISACODYL 10 MG
10 SUPPOSITORY, RECTAL RECTAL DAILY PRN
Status: DISCONTINUED | OUTPATIENT
Start: 2023-11-01 | End: 2023-11-02 | Stop reason: HOSPADM

## 2023-11-01 RX ORDER — NALOXONE HYDROCHLORIDE 0.4 MG/ML
0.4 INJECTION, SOLUTION INTRAMUSCULAR; INTRAVENOUS; SUBCUTANEOUS
Status: DISCONTINUED | OUTPATIENT
Start: 2023-11-01 | End: 2023-11-01 | Stop reason: HOSPADM

## 2023-11-01 RX ORDER — OXYTOCIN 10 [USP'U]/ML
10 INJECTION, SOLUTION INTRAMUSCULAR; INTRAVENOUS
Status: DISCONTINUED | OUTPATIENT
Start: 2023-11-01 | End: 2023-11-02 | Stop reason: HOSPADM

## 2023-11-01 RX ORDER — EPHEDRINE SULFATE 50 MG/ML
5 INJECTION, SOLUTION INTRAMUSCULAR; INTRAVENOUS; SUBCUTANEOUS
Status: DISCONTINUED | OUTPATIENT
Start: 2023-11-01 | End: 2023-11-01 | Stop reason: HOSPADM

## 2023-11-01 RX ORDER — ONDANSETRON 4 MG/1
4 TABLET, ORALLY DISINTEGRATING ORAL EVERY 6 HOURS PRN
Status: DISCONTINUED | OUTPATIENT
Start: 2023-11-01 | End: 2023-11-01 | Stop reason: HOSPADM

## 2023-11-01 RX ORDER — FENTANYL/ROPIVACAINE/NS/PF 2MCG/ML-.1
PLASTIC BAG, INJECTION (ML) EPIDURAL
Status: DISCONTINUED | OUTPATIENT
Start: 2023-11-01 | End: 2023-11-01 | Stop reason: HOSPADM

## 2023-11-01 RX ORDER — BUPIVACAINE HYDROCHLORIDE 2.5 MG/ML
INJECTION, SOLUTION EPIDURAL; INFILTRATION; INTRACAUDAL
Status: COMPLETED | OUTPATIENT
Start: 2023-11-01 | End: 2023-11-01

## 2023-11-01 RX ORDER — CITRIC ACID/SODIUM CITRATE 334-500MG
30 SOLUTION, ORAL ORAL
Status: DISCONTINUED | OUTPATIENT
Start: 2023-11-01 | End: 2023-11-01 | Stop reason: HOSPADM

## 2023-11-01 RX ORDER — METHYLERGONOVINE MALEATE 0.2 MG/ML
200 INJECTION INTRAVENOUS
Status: DISCONTINUED | OUTPATIENT
Start: 2023-11-01 | End: 2023-11-01 | Stop reason: HOSPADM

## 2023-11-01 RX ORDER — ACETAMINOPHEN 325 MG/1
650 TABLET ORAL EVERY 4 HOURS PRN
Status: DISCONTINUED | OUTPATIENT
Start: 2023-11-01 | End: 2023-11-02 | Stop reason: HOSPADM

## 2023-11-01 RX ORDER — IBUPROFEN 800 MG/1
800 TABLET, FILM COATED ORAL
Status: DISCONTINUED | OUTPATIENT
Start: 2023-11-01 | End: 2023-11-02 | Stop reason: HOSPADM

## 2023-11-01 RX ORDER — SODIUM CHLORIDE, SODIUM LACTATE, POTASSIUM CHLORIDE, CALCIUM CHLORIDE 600; 310; 30; 20 MG/100ML; MG/100ML; MG/100ML; MG/100ML
INJECTION, SOLUTION INTRAVENOUS CONTINUOUS PRN
Status: DISCONTINUED | OUTPATIENT
Start: 2023-11-01 | End: 2023-11-01 | Stop reason: HOSPADM

## 2023-11-01 RX ORDER — NALBUPHINE HYDROCHLORIDE 20 MG/ML
2.5-5 INJECTION, SOLUTION INTRAMUSCULAR; INTRAVENOUS; SUBCUTANEOUS EVERY 6 HOURS PRN
Status: DISCONTINUED | OUTPATIENT
Start: 2023-11-01 | End: 2023-11-02 | Stop reason: HOSPADM

## 2023-11-01 RX ADMIN — Medication: at 11:04

## 2023-11-01 RX ADMIN — BUPIVACAINE HYDROCHLORIDE 10 ML: 2.5 INJECTION, SOLUTION EPIDURAL; INFILTRATION; INTRACAUDAL at 10:58

## 2023-11-01 RX ADMIN — FENTANYL CITRATE 50 MCG: 50 INJECTION, SOLUTION INTRAMUSCULAR; INTRAVENOUS at 09:31

## 2023-11-01 RX ADMIN — SODIUM CHLORIDE, POTASSIUM CHLORIDE, SODIUM LACTATE AND CALCIUM CHLORIDE: 600; 310; 30; 20 INJECTION, SOLUTION INTRAVENOUS at 06:33

## 2023-11-01 RX ADMIN — SODIUM CHLORIDE, POTASSIUM CHLORIDE, SODIUM LACTATE AND CALCIUM CHLORIDE: 600; 310; 30; 20 INJECTION, SOLUTION INTRAVENOUS at 10:55

## 2023-11-01 RX ADMIN — Medication 2 MILLI-UNITS/MIN: at 07:02

## 2023-11-01 ASSESSMENT — ACTIVITIES OF DAILY LIVING (ADL)
ADLS_ACUITY_SCORE: 18

## 2023-11-01 NOTE — L&D DELIVERY NOTE
OB Vaginal Delivery Note    Alvino Perkins MRN# [id number]   Age: 28 year old YOB: 1995       GA: 40w3d  GP:   Labor Complications:    EBL:   mL  Delivery QBL: 600 mL  Delivery Type: Vaginal, Spontaneous   ROM to Delivery Time: (Delivered) Hours: 1 Minutes: 23   Weight: 2.85 kg (6 lb 4.5 oz)    1 Minute 5 Minute 10 Minute   Apgar Totals: 8   9        ARJUN, MURIEL;SAI BISHOP;AAYUSH JACOBSON     Delivery Details:  Alvino Perkins, a 28 year old  female delivered a viable infant with apgars of 8  and 9 . Patient was fully dilated and pushing after   hours   minutes in active labor. Delivery was via vaginal, spontaneous  to a sterile field under epidural  anesthesia. Infant delivered in vertex  left  occiput    position. Anterior and posterior shoulders delivered without difficulty. The cord was clamped, cut twice and 3 vessels  were noted. Cord blood was obtained in routine fashion with the following disposition: discard .      Cord complications: none   Placenta delivered at 2023  1:51 PM . Placental disposition was Hospital disposal . Fundal massage performed and fundus found to be firm.     Episiotomy: none    Perineum, vagina, cervix were inspected, and the following lacerations were noted:   Perineal lacerations: 2nd         vaginal laceration noted       Any lacerations were repaired in the usual fashion using 3.0 vicryl.    Excellent hemostasis was noted. Needle count correct. Infant and patient in delivery room in good and stable condition.        Gregorio, Female-Pa [[id number]]      Labor Event Times          Active labor onset date: 23 Onset time: 10:16 AM   Dilation complete date: 23 Complete time:  1:15 PM   Start pushing date/time: 2023 1315          Labor Events     labor?: No   steroids: None  Labor Type: Augmentation  Predominate monitoring during 1st stage: continuous electronic fetal monitoring     Antibiotics received during labor?: No    "    Rupture date/time: 23 1225   Rupture type: Artificial Rupture of Membranes  Fluid color: Clear  Fluid odor: Normal     Augmentation: Oxytocin  Augmentation date/time: 23 0702   Indications for augmentation: Ineffective Contraction Pattern       Delivery/Placenta Date and Time      Delivery Date: 23 Delivery Time:  1:48 PM   Placenta Date/Time: 2023  1:51 PM  Oxytocin given at the time of delivery: with anterior shoulder  Delivering clinician: Aquilino Welch MD   Other personnel present at delivery:  Provider Role   Steff Singh MD Resident   Sai Merino, RN Delivery Nurse   America Wong RN Registered Nurse             Vaginal Counts       Initial count performed by 2 team members:  Two Team Members   WAN Welch         Needles Suture Needles Sponges (RETIRED) Instruments   Initial counts   5    Added to count 1 1 5    Relief counts       Final counts               Placed during labor Accounted for at the end of labor   FSE NA NA   IUPC NA NA   Cervidil NA NA                                   Apgars    Living status: Living   1 Minute 5 Minute 10 Minute 15 Minute 20 Minute   Skin color: 0  1       Heart rate: 2  2       Reflex irritability: 2  2       Muscle tone: 2  2       Respiratory effort: 2  2       Total: 8  9       Apgars assigned by: SAI AVILA       Cord      Vessels: 3 Vessels    Cord Complications: None               Cord Blood Disposition: Discard    Gases Sent?: No    Delayed cord clamping?: Yes    Cord Clamping Delay (seconds): 31-60 seconds                        Resuscitation           Output in Delivery Room: Voided       [place] Measurements      Weight: 6 lb 4.5 oz Length: 1' 8\"     Head circumference: 33.7 cm    Output in delivery room: Voided       Skin to Skin and Feeding Plan      Skin to skin initiation date/time: 1841    Skin to skin with: Mother  Skin to skin end date/time:            Labor Events and Shoulder Dystocia    Fetal Tracing " Prior to Delivery: Category 1                 Delivery (Maternal) (Provider to Complete) ([id number])    Episiotomy: None      Perineal lacerations: 2nd      Vaginal laceration?: Yes Repaired?: Yes   Repair suture: 3-0 Vicryl  Genital tract inspection done: Pos       Blood Loss  Mother: Alvino Perkins #[id number]     Start of Mother's Information      Delivery Blood Loss  11/01/23 1016 - 11/01/23 1421      Delivery QBL (mL) Hospital Encounter 600 mL    Total  600 mL               End of Mother's Information  Mother: Alvino Perkins #[id number]                Delivery - Provider to Complete ([id number])    Delivering clinician: Aquilino Welch MD  Delivery Type (Choose the 1 that will go to the Birth History): Vaginal, Spontaneous                         Other personnel:  Provider Role   Steff Singh MD Resident   Reny Merino, RN Delivery Nurse   America Wong RN Registered Nurse                    Placenta    Date/Time: 11/1/2023  1:51 PM  Removal: Spontaneous  Disposition: Hospital disposal             Anesthesia    Method: Epidural  Cervical dilation at placement: 4-7                    Presentation and Position    Presentation: Vertex    Position: Left Occiput                      Aquilino Welch MD

## 2023-11-01 NOTE — ANESTHESIA PROCEDURE NOTES
"Epidural catheter Procedure Note    Pre-Procedure   Staff -        Anesthesiologist:  Rocky Horvath MD       Performed By: anesthesiologist       Location: OB       Procedure Start/Stop Times: 11/1/2023 10:46 AM and 11/1/2023 11:06 AM       Pre-Anesthestic Checklist: patient identified, IV checked, risks and benefits discussed, informed consent, monitors and equipment checked, pre-op evaluation, at physician/surgeon's request and post-op pain management  Timeout:       Correct Patient: Yes        Correct Procedure: Yes        Correct Site: Yes        Correct Position: Yes   Procedure Documentation  Procedure: epidural catheter       Patient Position: sitting       Skin prep: Chloraprep       Local skin infiltrated with mL of 1% lidocaine.        Insertion Site: L3-4. (midline approach).       Technique: LORT air        Needle Type: Touhy needle       Needle Gauge: 18.        Catheter: 20 G.          Catheter threaded easily.             # of attempts: 1 and  # of redirects:     Assessment/Narrative         Paresthesias: No.       Test dose of 3 mL lidocaine 1.5% w/ 1:200,000 epinephrine at 10:56 CDT.         Test dose negative, 3 minutes after injection, for signs of intravascular, subdural, or intrathecal injection.       Insertion/Infusion Method: LORT air       Aspiration negative for Heme or CSF via Epidural Catheter.    Medication(s) Administered   0.25% Bupivacaine PF (Epidural) - EPIDURAL   10 mL - 11/1/2023 10:58:00 AM  Medication Administration Time: 11/1/2023 10:46 AM      FOR [facility] ([facility] ONLY:   Pain Team Contact information: please page the Pain Team Via [employer] Search \"Pain\". During daytime hours, please page the attending first. At night please page the resident first.      "

## 2023-11-01 NOTE — PROGRESS NOTES
Data: Patient presented to [facility]: 10/31/2023 11:46 PM.  Reason for maternal/fetal assessment is uterine contractions. Patient reports contractions. Patient denies vaginal bleeding, nausea, vomiting, headache, visual disturbances, epigastric or RUQ pain, significant edema. Patient reports fetal movement is normal. Patient is a 40w3d .  Prenatal record reviewed. Pregnancy has been uncomplicated.    Vital signs wnl. Support person is present.     Action: Verbal consent for EFM. Triage assessment completed.     Response: Patient verbalized agreement with plan. Will contact Dr. Welch with update and further orders.

## 2023-11-01 NOTE — ANESTHESIA PREPROCEDURE EVALUATION
"Anesthesia Pre-Procedure Evaluation    Patient: Alvino Perkisn   MRN: [id number] : 1995        Procedure :           Past Medical History:   Diagnosis Date    Gonorrhea     Urinary tract infection       History reviewed. No pertinent surgical history.   No Known Allergies   Social History     Tobacco Use    Smoking status: Never     Passive exposure: Never    Smokeless tobacco: Never    Tobacco comments:     No exposure to second hand smoking.   Substance Use Topics    Alcohol use: No      Wt Readings from Last 1 Encounters:   23 62.1 kg (137 lb)        Anesthesia Evaluation            ROS/MED HX  ENT/Pulmonary:  - neg pulmonary ROS     Neurologic:  - neg neurologic ROS     Cardiovascular:  - neg cardiovascular ROS     METS/Exercise Tolerance:     Hematologic:  - neg hematologic  ROS     Musculoskeletal:       GI/Hepatic:  - neg GI/hepatic ROS     Renal/Genitourinary:  - neg Renal ROS     Endo:  - neg endo ROS     Psychiatric/Substance Use:  - neg psychiatric ROS     Infectious Disease:  - neg infectious disease ROS     Malignancy:       Other:            Physical Exam    Airway        Mallampati: I   TM distance: > 3 FB   Neck ROM: full     Respiratory Devices and Support         Dental           Cardiovascular             Pulmonary   pulmonary exam normal                OUTSIDE LABS:  CBC:   Lab Results   Component Value Date    WBC 6.6 2023    WBC 6.3 2019    HGB 10.5 (L) 2023    HGB 10.4 (L) 08/10/2023    HCT 40.4 2023    HCT 41.5 2019     2023     2019     BMP:   Lab Results   Component Value Date     2019    POTASSIUM 4.2 2019    CHLORIDE 107 2019    CO2 20 (L) 2019    BUN 12 2019    CR 0.66 2019    GLC 80 2019     COAGS: No results found for: \"PTT\", \"INR\", \"FIBR\"  POC:   Lab Results   Component Value Date    HCG Positive (A) 2023     HEPATIC:   Lab Results   Component Value Date    ALBUMIN 4.2 " 07/29/2019    PROTTOTAL 7.4 07/29/2019    ALT <9 07/29/2019    AST 15 07/29/2019    ALKPHOS 68 07/29/2019    BILITOTAL 0.5 07/29/2019     OTHER:   Lab Results   Component Value Date    A1C 4.9 03/30/2023    JOSHUA 9.8 07/29/2019    LIPASE 20 07/29/2019    AMYLASE 80 07/29/2019       Anesthesia Plan    ASA Status:  2       Anesthesia Type: Epidural.              Consents          - Extended Intubation/Ventilatory Support Discussed: No.      - Patient is DNR/DNI Status: No     Use of blood products discussed: No .     Postoperative Care            Comments:                ILAN TY MD

## 2023-11-01 NOTE — PROGRESS NOTES
Dr. Welch updated SVE. Plan to admit patient and complete repeat SVE when patient becomes more uncomfortable or in 3 hours.

## 2023-11-01 NOTE — ANESTHESIA POSTPROCEDURE EVALUATION
Patient: Alvino MUNGUIA Gregorio    Procedure: * No procedures listed *       Anesthesia Type:  Epidural    Note:  Disposition: Inpatient   Postop Pain Control: Uneventful            Sign Out: Well controlled pain   PONV: No   Neuro/Psych: Uneventful            Sign Out: Acceptable/Baseline neuro status   Airway/Respiratory: Uneventful            Sign Out: Acceptable/Baseline resp. status   CV/Hemodynamics: Uneventful            Sign Out: Acceptable CV status; No obvious hypovolemia; No obvious fluid overload   Other NRE: NONE   DID A NON-ROUTINE EVENT OCCUR? No           Last vitals:  Vitals:    11/01/23 1551 11/01/23 1552 11/01/23 1556   BP:  103/56    Pulse:      Resp:      Temp:      SpO2: 98%  98%       Electronically Signed By: ILAN TY MD  November 1, 2023  4:02 PM

## 2023-11-01 NOTE — H&P
"[facility] Labor and Delivery History and Physical    Alvino Perkins MRN# [id number]   Age: 28 year old YOB: 1995     Date of Admission:  10/31/2023    Primary care provider: Aquilino Welch           Chief Complaint:   Alvino Perkins is a 28 year old female who is 40w3d pregnant and being admitted for active labor management.          Pregnancy history:     OBSTETRIC HISTORY:    OB History    Para Term  AB Living   1 0 0 0 0 0   SAB IAB Ectopic Multiple Live Births   0 0 0 0 0      # Outcome Date GA Lbr Da/2nd Weight Sex Delivery Anes PTL Lv   1 Current                EDC: Estimated Date of Delivery: 10/29/23    Prenatal Labs:   Lab Results   Component Value Date    AS Negative 2023    HEPBANG Nonreactive 2023    GCPCRT Negative 2019    HGB 10.5 (L) 2023       GBS Status:   No results found for: \"GBS\"    Active Problem List  Patient Active Problem List   Diagnosis    Acne Vulgaris    Twin, Mate Liveborn    Congenital Scoliosis    Nephrolithiasis    Asymptomatic microscopic hematuria    9 weeks gestation of pregnancy    Supervision of normal first pregnancy, antepartum    Encounter for triage in pregnant patient    Pregnant       Medication Prior to Admission  Medications Prior to Admission   Medication Sig Dispense Refill Last Dose    Prenatal MV-Min-Fe Fum-FA-DHA (PRENATAL MULTIVITAMIN PLUS DHA) 27-0.8-250 MG CAPS Take 1 tablet by mouth daily OK to substitute formulary equivalent 100 capsule 3 Past Month   .        Maternal Past Medical History:     Past Medical History:   Diagnosis Date    Gonorrhea     Urinary tract infection                        Family History:     Family History   Problem Relation Age of Onset    No Known Problems Mother     No Known Problems Father     No Known Problems Sister     No Known Problems Brother      Family history reviewed and updated in [facility]            Social History:     Social History     Socioeconomic History    " Marital status: Single     Spouse name: Not on file    Number of children: Not on file    Years of education: Not on file    Highest education level: Not on file   Occupational History    Not on file   Tobacco Use    Smoking status: Never     Passive exposure: Never    Smokeless tobacco: Never    Tobacco comments:     No exposure to second hand smoking.   Vaping Use    Vaping Use: Never used   Substance and Sexual Activity    Alcohol use: No    Drug use: No    Sexual activity: Yes     Partners: Male     Birth control/protection: Condom   Other Topics Concern    Not on file   Social History Narrative    Not on file     Social Determinants of Health     Financial Resource Strain: Low Risk  (10/3/2023)    Financial Resource Strain     Within the past 12 months, have you or your family members you live with been unable to get utilities (heat, electricity) when it was really needed?: No   Food Insecurity: Low Risk  (10/3/2023)    Food Insecurity     Within the past 12 months, did you worry that your food would run out before you got money to buy more?: No     Within the past 12 months, did the food you bought just not last and you didn t have money to get more?: No   Transportation Needs: Low Risk  (10/3/2023)    Transportation Needs     Within the past 12 months, has lack of transportation kept you from medical appointments, getting your medicines, non-medical meetings or appointments, work, or from getting things that you need?: No   Physical Activity: Not on file   Stress: Not on file   Social Connections: Not on file   Interpersonal Safety: Low Risk  (10/3/2023)    Interpersonal Safety     Do you feel physically and emotionally safe where you currently live?: Yes     Within the past 12 months, have you been hit, slapped, kicked or otherwise physically hurt by someone?: No     Within the past 12 months, have you been humiliated or emotionally abused in other ways by your partner or ex-partner?: No   Housing Stability:  Low Risk  (10/3/2023)    Housing Stability     Do you have housing? : Yes     Are you worried about losing your housing?: No            Review of Systems:   The Review of Systems is negative other than noted in the HPI          Physical Exam:   Vitals were reviewed  Constitutional:   awake, alert, cooperative, no apparent distress, and appears stated age     Abdomen:   No scars, normal bowel sounds, soft, non-distended, non-tender, no masses palpated, no hepatosplenomegally     Neurologic:   Awake, alert, oriented to name, place and time.  Cranial nerves II-XII are grossly intact.  Motor is 5 out of 5 bilaterally.  Cerebellar finger to nose, heel to shin intact.  Sensory is intact.  Babinski down going, Romberg negative, and gait is normal.      Cervix:   Membranes: intact   Dilation: 4   Effacement: 90%   Station:0   Consistency: soft   Position: Mid  Presentation:Cephalic  Fetal Heart Rate Tracing: reactive and reassuring  Tocometer: inadequate                       Assessment:   Alvino Perkins is a 40w3d pregnant female admitted with active labor management and augmentation of labor.          Plan:   Admit - see IP orders  Patient had regular contractions overnight, but little cervical change.   Start pitocin.   Pain medication as needed.   Anemia is stable.   Anticipate normal vaginal delivery.  Aquilino Welch MD

## 2023-11-02 VITALS
TEMPERATURE: 98 F | DIASTOLIC BLOOD PRESSURE: 58 MMHG | BODY MASS INDEX: 25.29 KG/M2 | HEIGHT: 60 IN | OXYGEN SATURATION: 98 % | RESPIRATION RATE: 16 BRPM | SYSTOLIC BLOOD PRESSURE: 98 MMHG | WEIGHT: 128.8 LBS | HEART RATE: 84 BPM

## 2023-11-02 PROCEDURE — 99207 PR SUBSEQUENT HOSPITAL CARE FOR MOTHER, 15 MINUTES: CPT | Performed by: FAMILY MEDICINE

## 2023-11-02 PROCEDURE — 250N000013 HC RX MED GY IP 250 OP 250 PS 637: Performed by: FAMILY MEDICINE

## 2023-11-02 RX ORDER — IBUPROFEN 800 MG/1
800 TABLET, FILM COATED ORAL EVERY 8 HOURS PRN
Qty: 30 TABLET | Refills: 0 | Status: SHIPPED | OUTPATIENT
Start: 2023-11-02

## 2023-11-02 RX ORDER — DOCUSATE SODIUM 100 MG/1
100 CAPSULE, LIQUID FILLED ORAL 2 TIMES DAILY PRN
Qty: 60 CAPSULE | Refills: 0 | Status: SHIPPED | OUTPATIENT
Start: 2023-11-02

## 2023-11-02 RX ORDER — FERROUS GLUCONATE 324(38)MG
324 TABLET ORAL
Qty: 60 TABLET | Refills: 0 | Status: SHIPPED | OUTPATIENT
Start: 2023-11-03

## 2023-11-02 RX ADMIN — BENZOCAINE AND LEVOMENTHOL: 200; 5 SPRAY TOPICAL at 01:47

## 2023-11-02 RX ADMIN — WITCH HAZEL: 500 SOLUTION RECTAL; TOPICAL at 01:47

## 2023-11-02 RX ADMIN — DOCUSATE SODIUM 100 MG: 100 CAPSULE, LIQUID FILLED ORAL at 08:28

## 2023-11-02 RX ADMIN — FERROUS GLUCONATE 324 MG: 324 TABLET ORAL at 08:28

## 2023-11-02 RX ADMIN — PRENATAL VIT W/ FE FUMARATE-FA TAB 27-0.8 MG 1 TABLET: 27-0.8 TAB at 08:28

## 2023-11-02 ASSESSMENT — ACTIVITIES OF DAILY LIVING (ADL)
ADLS_ACUITY_SCORE: 18

## 2023-11-02 NOTE — PROGRESS NOTES
Vaginal Delivery Postpartum Day 1    Patient Name:  Alvino Perkins  :      1995  MRN:     [id number]    Subjective:  Feeling well.  Lochia decreasing.   Pain is well controlled.  Voiding without difficulty, tolerating normal diet, and passing flatus.   The patient has no emotional concerns.  The baby is well and being fed by both breast and bottle.    Objective:  Patient Vitals for the past 24 hrs:   BP Temp Temp src Pulse Resp SpO2   23 0800 101/59 98.3  F (36.8  C) Oral 96 16 96 %   23 0334 110/48 97.3  F (36.3  C) Oral 67 16 98 %   23 2342 106/54 98.1  F (36.7  C) Oral 93 16 96 %   23 1947 (!) 87/52 99.4  F (37.4  C) Oral 95 16 96 %   23 1724 103/51 98.3  F (36.8  C) Oral 75 16 --   23 1631 -- -- -- -- -- 97 %   23 1626 -- -- -- -- -- 98 %   23 1621 -- -- -- -- -- 98 %   23 1616 -- -- -- -- -- 98 %   23 1611 -- -- -- -- -- 98 %   23 1606 -- -- -- -- -- 96 %   23 1600 101/59 -- -- -- -- 98 %   23 1556 -- -- -- -- -- 98 %   23 1552 103/56 -- -- -- -- --   23 1551 -- -- -- -- -- 98 %   23 1546 -- -- -- -- -- 98 %   23 1545 -- -- -- -- -- 98 %   23 1541 -- -- -- -- -- 98 %   23 1536 -- -- -- -- -- 98 %   23 1531 -- -- -- -- -- 99 %   23 1526 -- -- -- -- -- 98 %   23 1521 -- -- -- -- -- 98 %   23 1501 -- -- -- -- -- 99 %   23 1500 -- -- -- -- -- 99 %   23 1456 -- -- -- -- -- 98 %   23 1451 -- -- -- -- -- 96 %   23 1446 -- -- -- -- -- 98 %   23 1441 -- -- -- -- -- 98 %   23 1436 -- -- -- -- -- 97 %   23 1431 -- -- -- -- -- 100 %   23 1426 -- -- -- -- -- 99 %   23 1422 93/50 -- -- -- -- --   23 1421 -- -- -- -- -- 99 %   23 1416 -- -- -- -- -- 98 %   23 1411 -- -- -- -- -- 98 %   23 1407 98/53 -- -- -- -- --   23 1406 -- -- -- -- -- 97 %   23 1401 -- -- -- -- -- 96 %   23 1400 -- 98  F  (36.7  C) Oral -- 16 96 %   11/01/23 1356 -- -- -- -- -- 95 %   11/01/23 1353 102/54 -- -- -- -- --   11/01/23 1352 -- -- -- -- -- 94 %   11/01/23 1351 -- -- -- -- -- 94 %   11/01/23 1346 -- -- -- -- -- 96 %   11/01/23 1341 -- -- -- -- -- 93 %   11/01/23 1336 -- -- -- -- -- 98 %   11/01/23 1335 -- -- -- -- -- (!) 87 %   11/01/23 1331 -- -- -- -- -- 98 %   11/01/23 1326 -- -- -- -- -- 98 %   11/01/23 1323 -- -- -- -- -- 90 %   11/01/23 1321 -- -- -- -- -- 98 %   11/01/23 1317 -- -- -- -- -- 91 %   11/01/23 1316 -- -- -- -- -- 99 %   11/01/23 1312 116/59 -- -- -- -- --   11/01/23 1311 -- -- -- -- -- 100 %   11/01/23 1306 -- -- -- -- -- 98 %   11/01/23 1301 -- -- -- -- -- 98 %   11/01/23 1257 112/60 -- -- -- -- --   11/01/23 1256 -- -- -- -- -- 99 %   11/01/23 1251 -- -- -- -- -- 99 %   11/01/23 1246 -- -- -- -- -- 99 %   11/01/23 1243 106/65 -- -- -- -- --   11/01/23 1241 -- -- -- -- -- 100 %   11/01/23 1236 -- -- -- -- -- 99 %   11/01/23 1231 -- -- -- -- -- 100 %   11/01/23 1228 112/71 98.1  F (36.7  C) Oral -- 16 99 %   11/01/23 1221 -- -- -- -- -- 100 %   11/01/23 1216 -- -- -- -- -- 100 %   11/01/23 1212 111/57 -- -- -- -- --   11/01/23 1211 -- -- -- -- -- 99 %   11/01/23 1206 -- -- -- -- -- 100 %   11/01/23 1200 -- -- -- -- -- 100 %   11/01/23 1158 101/56 -- -- -- -- 100 %   11/01/23 1151 101/56 -- -- -- -- 100 %   11/01/23 1146 -- -- -- -- -- 99 %   11/01/23 1141 -- -- -- -- -- 100 %   11/01/23 1137 (!) 85/53 -- -- -- -- --   11/01/23 1136 -- -- -- -- -- 98 %   11/01/23 1132 104/63 -- -- -- -- --   11/01/23 1131 -- -- -- -- -- 99 %   11/01/23 1130 -- 98  F (36.7  C) Oral -- 16 99 %   11/01/23 1126 103/56 -- -- -- -- 99 %   11/01/23 1122 110/56 -- -- -- -- --   11/01/23 1121 -- -- -- -- -- 99 %   11/01/23 1116 112/62 -- -- -- -- 98 %   11/01/23 1115 112/55 -- -- -- -- --   11/01/23 1112 99/54 -- -- -- -- --   11/01/23 1111 -- -- -- -- -- 97 %   11/01/23 1110 115/59 -- -- -- -- --   11/01/23 1108 111/61 -- -- --  -- --   11/01/23 1106 -- -- -- -- -- 98 %   11/01/23 1104 112/61 -- -- -- -- --   11/01/23 1101 -- -- -- -- -- 98 %   11/01/23 1100 112/61 -- -- -- -- --   11/01/23 1056 -- -- -- -- -- 99 %   11/01/23 1051 -- -- -- -- -- 95 %   11/01/23 1049 -- -- -- -- -- (!) 85 %   11/01/23 1046 -- -- -- -- -- 97 %   11/01/23 1041 -- -- -- -- -- 98 %   11/01/23 1038 -- -- -- -- -- 94 %   11/01/23 1036 -- -- -- -- -- 97 %   11/01/23 1015 -- 98  F (36.7  C) Oral -- 18 --   11/01/23 0934 135/58 98  F (36.7  C) Oral 62 16 --      GEN: alert, oriented, not distressed  ABD: fundus firm at -1 / u  EXT: no calf tenderness or edema  Urinary output is adequate.     Immunization History   Administered Date(s) Administered    COVID-19 MONOVALENT 12+ (Pfizer) 08/03/2021, 09/03/2021, 10/13/2021, 10/16/2021    DTaP, Unspecified 10/26/2000    HPV Quadrivalent 08/24/2009, 10/18/2010    HPV9 06/07/2016    HepA, Unspecified 08/24/2009, 10/18/2010    HepB, Unspecified 1995, 1995, 1995, 03/19/1997    Hepatitis B, Peds 06/29/2016    Hib, Unspecified 1996, 02/06/1997, 09/22/1997, 11/11/1997    Historical DTP/aP 1996, 02/06/1997, 09/22/1997, 11/11/1997    Influenza Vaccine >6 months (Alfuria,Fluzone) 09/27/2019, 10/16/2021, 10/03/2023    Influenza Vaccine, 6+MO IM (QUADRIVALENT W/PRESERVATIVES) 11/05/2016, 11/01/2017, 09/21/2018    MMR 09/22/1997, 10/26/2000, 06/29/2016    Meningococcal ACWY (Menactra ) 08/24/2009, 06/07/2016    Poliovirus, inactivated (IPV) 1996, 1996, 02/06/1997, 09/22/1997, 10/26/2000    Rubella Immunity: Titer/md Dx 03/31/2023    TDAP (Adacel,Boostrix) 08/24/2009, 07/29/2019, 06/04/2021, 11/11/2022, 09/13/2023, 10/18/2023    Typhoid, Unspecified Formulation 04/20/2011    Varicella 11/11/1997, 08/24/2009     Assessment:   Post partum day 1.  Doing well.    Plan:    Continue current care.  Patient hopeful for 24 hour discharge

## 2023-11-02 NOTE — DISCHARGE INSTRUCTIONS
Warning Signs after Having a Baby    Keep this paper on your fridge or somewhere else where you can see it.    Call your provider if you have any of these symptoms up to 12 weeks after having your baby.    Thoughts of hurting yourself or your baby  Pain in your chest or trouble breathing  Severe headache not helped by pain medicine  Eyesight concerns (blurry vision, seeing spots or flashes of light, other changes to eyesight)  Fainting, shaking or other signs of a seizure    Call 9-1-1 if you feel that it is an emergency.     The symptoms below can happen to anyone after giving birth. They can be very serious. Call your provider if you have any of these warning signs.    My provider s phone number: _______________________    Losing too much blood (hemorrhage)    Call your provider if you soak through a pad in less than an hour or pass blood clots bigger than a golf ball. These may be signs that you are bleeding too much.    Blood clots in the legs or lungs    After you give birth, your body naturally clots its blood to help prevent blood loss. Sometimes this increased clotting can happen in other areas of the body, like the legs or lungs. This can block your blood flow and be very dangerous.     Call your provider if you:  Have a red, swollen spot on the back of your leg that is warm or painful when you touch it.   Are coughing up blood.     Infection    Call your provider if you have any of these symptoms:  Fever of 100.4 F (38 C) or higher.  Pain or redness around your stitches if you had an incision.   Any yellow, white, or green fluid coming from places where you had stitches or surgery.    Mood Problems (postpartum depression)    Many people feel sad or have mood changes after having a baby. But for some people, these mood swings are worse.     Call your provider right away if you feel so anxious or nervous that you can't care for yourself or your baby.    Preeclampsia (high blood pressure)    Even if you  didn't have high blood pressure when you were pregnant, you are at risk for the high blood pressure disease called preeclampsia. This risk can last up to 12 weeks after giving birth.     Call your provider if you have:   Pain on your right side under your rib cage  Sudden swelling in the hands and face    Remember: You know your body. If something doesn't feel right, get medical help.     For informational purposes only. Not to replace the advice of your health care provider. Copyright 2020 [facility]. All rights reserved. Clinically reviewed by Violetta Alberto, RNC-OB, MSN. [employer] [id number] - Rev 02/23.

## 2023-11-02 NOTE — PLAN OF CARE
Problem: Postpartum (Vaginal Delivery)  Goal: Effective Urinary Elimination  Outcome: Progressing   Goal Outcome Evaluation:       Pt able to ambulate to the bathroom with assistance of one person and void a large amount. Denied dizziness. VSS.

## 2023-11-02 NOTE — PLAN OF CARE
Problem: Adult Inpatient Plan of Care  Goal: Plan of Care Review  Description: The Plan of Care Review/Shift note should be completed every shift.  The Outcome Evaluation is a brief statement about your assessment that the patient is improving, declining, or no change.  This information will be displayed automatically on your shift  note.  Outcome: Progressing     Problem: Adult Inpatient Plan of Care  Goal: Optimal Comfort and Wellbeing  Outcome: Progressing  Intervention: Provide Person-Centered Care  Recent Flowsheet Documentation  Taken 11/1/2023 2342 by Jane Walden, RN  Trust Relationship/Rapport:   care explained   choices provided   questions answered   questions encouraged   thoughts/feelings acknowledged     Goal Outcome Evaluation:  Mother's VSS.  Stated to have no pain on this shift. Denied ibuprofen and tylenol PRN.   Gave dermoplast, tucks, and lavender salts for swollen perineum and per patient's request.  Ambulating with her significant other as an assistance.   Bonding well with baby through feedings, changed diapers, and holding.   Mother is formula feeding baby. Tolerating 5-12 mL per feedings. Educated parents on how to pace feed due to baby gagging.  Educated the importance of feeding baby every 2-3 hours.   Mother and Father are very attentive to infant cares and asking appropriate questions.    Jane Walden, RIDGE on 11/2/2023 at 2:47 AM

## 2023-11-02 NOTE — PLAN OF CARE
Problem: Adult Inpatient Plan of Care  Goal: Plan of Care Review  Description: The Plan of Care Review/Shift note should be completed every shift.  The Outcome Evaluation is a brief statement about your assessment that the patient is improving, declining, or no change.  This information will be displayed automatically on your shift  note.  Outcome: Progressing  Flowsheets (Taken 11/2/2023 1003)  Plan of Care Reviewed With: patient   Goal Outcome Evaluation:      Plan of Care Reviewed With: patient        VSS  Postpartum assessment WNL:  Fudus firm at umbilicus  Lochia light  2nd degree laceration, sore-declines tylenol and ibuprofen, using tucks, dermalplast and renetta bottle.   Voiding  Bottle feeding baby, started pumping this AM. Has pump at home.

## 2023-11-02 NOTE — DISCHARGE SUMMARY
Vaginal Delivery Postpartum Day 2/Discharge Summary    Patient Name:  Alvino Perkins  :      1995  MRN:      [id number]    Admission Date:  10/31/2023  Delivery Date:  2024  Gestational Age at Delivery:  40w3d  Discharge Date:  2023    Subjective:  Patient has no concerns.  Lochia is decreasing.  Pain is well controlled.  Eating and ambulating well.  Normal urine output.   The baby is well and being fed by both breast and bottle.    Discharge Exam:    Patient Vitals for the past 24 hrs:   BP Temp Temp src Pulse Resp SpO2   23 1200 116/54 98.2  F (36.8  C) Oral 84 18 97 %   23 0800 101/59 98.3  F (36.8  C) Oral 96 16 96 %   23 0334 110/48 97.3  F (36.3  C) Oral 67 16 98 %   23 2342 106/54 98.1  F (36.7  C) Oral 93 16 96 %   23 1947 (!) 87/52 99.4  F (37.4  C) Oral 95 16 96 %   23 1724 103/51 98.3  F (36.8  C) Oral 75 16 --      GEN: alert, not distressed  CHEST: clear  COR: regular without murmur  ABD: fundus firm at -1 / u  EXT:  No edema or calf tenderness    No results found for this or any previous visit (from the past 24 hour(s)).     Immunization History   Administered Date(s) Administered    COVID-19 MONOVALENT 12+ (Pfizer) 2021, 2021, 10/13/2021, 10/16/2021    DTaP, Unspecified 10/26/2000    HPV Quadrivalent 2009, 10/18/2010    HPV9 2016    HepA, Unspecified 2009, 10/18/2010    HepB, Unspecified 1996, 1996, 1996, 1997    Hepatitis B, Peds 2016    Hib, Unspecified 1996, 1997, 1997, 1997    Historical DTP/aP 1996, 1997, 1997, 1997    Influenza Vaccine >6 months (Alfuria,Fluzone) 2019, 10/16/2021, 10/03/2023    Influenza Vaccine, 6+MO IM (QUADRIVALENT W/PRESERVATIVES) 2016, 2017, 2018    MMR 1997, 10/26/2000, 2016    Meningococcal ACWY (Menactra ) 2009, 2016    Poliovirus, inactivated (IPV) 1996,  1996, 1997, 1997, 10/26/2000    Rubella Immunity: Titer/md Dx 2023    TDAP (Adacel,Boostrix) 2009, 2019, 2021, 2022, 2023, 10/18/2023    Typhoid, Unspecified Formulation 2011    Varicella 1997, 2009       Principal Diagnosis:  Well ppd #1, s/p NVD    Patient Active Problem List   Diagnosis    Acne Vulgaris    Twin, Mate Liveborn    Congenital Scoliosis    Nephrolithiasis    Asymptomatic microscopic hematuria    9 weeks gestation of pregnancy    Supervision of normal first pregnancy, antepartum    Encounter for triage in pregnant patient    Pregnant       Procedure(s) Performed:    Indication for Induction:  n/a    Plan:  Discharge to home.  Follow up with Dr. Welch in 2 and in 4-6 weeks  Patient Instructions:      Physical activity: at tolerated    Diet:  As tolerated    Discharge Medications:      Review of your medicines        START taking        Dose / Directions   docusate sodium 100 MG capsule  Commonly known as: COLACE  Used for:  (normal spontaneous vaginal delivery)      Dose: 100 mg  Take 1 capsule (100 mg) by mouth 2 times daily as needed for constipation  Quantity: 60 capsule  Refills: 0     ibuprofen 800 MG tablet  Commonly known as: ADVIL/MOTRIN  Used for:  (normal spontaneous vaginal delivery)      Dose: 800 mg  Take 1 tablet (800 mg) by mouth every 8 hours as needed for other (cramping)  Quantity: 30 tablet  Refills: 0            CONTINUE these medicines which may have CHANGED, or have new prescriptions. If we are uncertain of the size of tablets/capsules you have at home, strength may be listed as something that might have changed.        Dose / Directions   * ferrous gluconate 324 (38 Fe) MG tablet  Commonly known as: FERGON  This may have changed: Another medication with the same name was added. Make sure you understand how and when to take each.      Dose: 324 mg  Take 324 mg by mouth daily (with  breakfast)  Refills: 0     * ferrous gluconate 324 (38 Fe) MG tablet  Commonly known as: FERGON  This may have changed: You were already taking a medication with the same name, and this prescription was added. Make sure you understand how and when to take each.  Used for:  (normal spontaneous vaginal delivery)      Dose: 324 mg  Start taking on: November 3, 2023  Take 1 tablet (324 mg) by mouth daily (with breakfast)  Quantity: 60 tablet  Refills: 0           * This list has 2 medication(s) that are the same as other medications prescribed for you. Read the directions carefully, and ask your doctor or other care provider to review them with you.                CONTINUE these medicines which have NOT CHANGED        Dose / Directions   Prenatal Multivitamin Plus DHA 27-0.8-250 MG Caps  Used for: 9 weeks gestation of pregnancy      Dose: 1 tablet  Take 1 tablet by mouth daily OK to substitute formulary equivalent  Quantity: 100 capsule  Refills: 3               Where to get your medicines        These medications were sent to [address] [address]      Phone: [phone]   docusate sodium 100 MG capsule  ferrous gluconate 324 (38 Fe) MG tablet  ibuprofen 800 MG tablet

## 2023-11-03 ENCOUNTER — MEDICAL CORRESPONDENCE (OUTPATIENT)
Dept: HEALTH INFORMATION MANAGEMENT | Facility: CLINIC | Age: 28
End: 2023-11-03
Payer: COMMERCIAL

## 2023-11-03 NOTE — PLAN OF CARE
Goal Outcome Evaluation: Ready for discharge     Pa's VSS.  She's independent with self and baby cares.  She denies pain and declined pain meds.  She has been formula feeding baby while at hospital.  She was strongly encouraged to put baby to breast first, then supplement as needed.  She has a breast pump at home and plans to use that.  She was educated about how often to nurse baby and pump.  Pa and her SO were given written and verbal discharge instructions and danger signs.  They had no questions and verbalized understanding.  Pa signed the discharge paperwork.  Baby was buckled into an appropriate car seat; checked by the CNA.  The family was escorted to their vehicle by the CNA.                         Problem: Postpartum (Vaginal Delivery)  Goal: Successful Parent Role Transition  Outcome: Met  Intervention: Support Parent Role Transition  Recent Flowsheet Documentation  Taken 11/2/2023 1720 by Isabell Aguilar RN  Supportive Measures:   active listening utilized   counseling provided   decision-making supported   positive reinforcement provided   self-care encouraged  Goal: Hemostasis  Outcome: Met  Goal: Absence of Infection Signs and Symptoms  Outcome: Met  Intervention: Prevent or Manage Infection  Recent Flowsheet Documentation  Taken 11/2/2023 1720 by Isabell Aguilar RN  Infection Management: aseptic technique maintained  Goal: Anesthesia/Sedation Recovery  Outcome: Met  Goal: Optimal Pain Control and Function  Outcome: Met  Intervention: Prevent or Manage Pain  Recent Flowsheet Documentation  Taken 11/2/2023 1720 by Isabell Aguilar RN  Perineal Care:   absorbent brief/pad changed   perineal spray bottle/warm water use encouraged  Goal: Effective Urinary Elimination  Outcome: Met  Intervention: Monitor and Manage Urinary Retention  Recent Flowsheet Documentation  Taken 11/2/2023 1720 by Isabell Aguilar RN  Urinary Elimination Promotion: (hydration encouraged) other (see comments)

## 2023-11-04 NOTE — PROGRESS NOTES
Membranes swept today.    Letter written today for maternity leave.    Reviewed when to go to the hospital.   If she shows up at the hospital and is faustino.  Consider not sending home, but augmenting given her advanced state of descent, and likelihood of precipitous delivery or being unable to make it to the hospital.  This was discussed with the patient today.       Patient Education     Coronavirus Disease 2019 (COVID-19): Caring for Yourself or Others   If you or a household member test positive for COVID-19 or have symptoms like fever, cough, fatigue, loss of taste or smell, follow these guidelines for preventing spread of the virus and managing symptoms. This is regardless of your vaccination status.   If you have symptoms or have been diagnosed with COVID-19   If you have symptoms of COVID-19 or you test positive (even without symptoms):   Stay home at least 5 days and isolate in your home. Separate from others as much as possible. You are most likely infectious during these first 5 days. Day 0 is the first day you tested or first had symptoms. Day 1 is the first full day after your symptoms started or following your positive test. See the CDC's website for current, detailed information about staying home. Follow your local area's instructions on testing and staying home.  Stay away from work, school, and public places. Limit physical contact with family members. Limit visitors. Don't kiss anyone or share eating or drinking utensils. Clean surfaces you touch with disinfectant. This is to help prevent the virus from spreading.  Wear a high quality, well-fitting mask if you must be around others at home or in public. Don't go places where you are unable to wear a mask.  Don't travel.  Tell the healthcare staff about recent travel. This includes local travel on public transport. Staff may need to find other people you have been in contact with.  Take steps to improve airflow at home. For example, open windows to improve airflow, change filters in your air conditioning unit, and turn your thermostat to \"on\" instead of \"auto\" to improve airflow and filtration. For more tips, see the CDC website.  Don't share personal items such as eating or drinking utensils and linens or food.  If you need to cough or sneeze, do it into a tissue. Then throw the tissue into the trash. If you  don't have tissues, cough or sneeze into the bend of your elbow.  Wash your hands often.  Follow all instructions from your healthcare provider. Call your healthcare provider’s office before going. They can prepare and give you instructions. This will help prevent the virus from spreading.  If you need to go to a hospital or clinic, expect that the healthcare staff will wear protective equipment such as masks, gowns, gloves, and eye protection. You may be advised to wait in or enter through a separate area. This is to prevent the possible virus from spreading.  Follow all instructions the healthcare staff gives you.  Self-care at home   Protection  You can protect yourself and others from getting COVID-19 or from getting very sick if you get it:   Vaccinate. Several vaccines and booster shots are available to prevent COVID-19 or reduce its severity. These vaccines reduce how severe the illness will be if you get the virus. No vaccine is ever 100% effective in preventing any illness, but the COVID-19 vaccines work well and are safe. Vaccines are available for people as young as 6 months and boosters are available for people age 5 and older. Expert groups, including ACOG and CDC, advise pregnant or breastfeeding people to be vaccinated. Talk with your healthcare provider about which COVID-19 vaccine is best for you and your family.  Practice good handwashing.  Know your area's community transmission level and what to do if you've been exposed. Stay home if you have suspected or confirmed COVID-19.  Keep your distance when possible from a person who is sick or has tested positive for COVID-19.  Get tested if needed.  Wear a well-fitted mask as advised. See the CDC's mask website.  Improve airflow indoors and move indoor activities outside.  Call your healthcare provider for treatment if you have COVID-19 and are at high risk of getting very sick.    Treatment  Current treatment is mainly aimed at helping your body  while it fights the virus. This is known as supportive care. If you have confirmed COVID-19, talk with your healthcare provider. You may qualify for certain medicines approved by the FDA to prevent severe COVID-19 infection. For severe COVID-19, you may need to stay in the hospital.   Most people with COVID-19 recover with supportive care. This includes:   Getting rest. This helps your body fight the illness.  Staying hydrated.  Drinking liquids is the best way to prevent dehydration. Try to drink 6 to 8 glasses of liquids every day, or as advised by your provider. Also check with your provider about which fluids are best for you. Don't drink fluids that contain caffeine or alcohol.  Taking over-the-counter (OTC) pain medicine. These are used to help ease pain and reduce fever. Follow your healthcare provider's instructions for which OTC medicine to use.  If you've been treated for suspected or confirmed COVID-19, follow all of your healthcare team's instructions. You may also get instructions on position changes to help your breathing, such as lying on your belly (prone positioning). If you were treated at a hospital and discharged, you may be sent home with a pulse oximeter. This is a small electronic device that you clip on your fingertip. It measures the amount of oxygen in your body. Follow your healthcare team's instructions on its use, how they will be in touch with you, and let you know when to call them.   For people who test positive for COVID-19 and are more likely to get very sick or die from COVID-19, treatments are available that can reduce the chances of a hospital stay or death. If you test positive for COVID-19 and are at high-risk for getting very sick from COVID, contact your healthcare provider right away to learn more about your treatment options.   Home care for a sick person   Follow all instructions from healthcare staff.  Wash your hands often.  Wear protective clothing as advised. Wear a  mask when caring for someone with COVID-19.  Make sure the sick person wears a mask. If they can't wear a mask, don't stay in the same room with the person. If you must be in the same room, wear a face mask. When wearing a mask, make sure that it covers both the nose and mouth.  Keep track of the sick person’s symptoms.  Clean home surfaces often with disinfectant. This includes phones, kitchen counters, fridge door handle, bathroom surfaces, and others.  Don’t let anyone share household items with the sick person. This includes eating and drinking tools, towels, sheets, or blankets.  Clean fabrics and laundry thoroughly.  Keep other people and pets away from the sick person.    When you can stop isolation  You can end isolation based on how serious your COVID-19 symptoms were.   If you had a positive COVID-19 test but had no symptoms, you can end isolation after day 5. Day 0 was the first day you tested.   If you had COVID-19 symptoms, you may end isolation after day 5 if:     You are fever-free for 24 hours without using fever-reducing medicines such as acetaminophen, and  Your symptoms such as cough or trouble breathing are better    If you have moderate or severe COVID-19,  your instructions on when to stop isolation are different. Moderate COVID-19 means you had shortness of breath or difficulty breathing. Severe COVID-19 means you were in the hospital. If you have a weak immune system and COVID-19, you also will be advised to isolate longer. Some conditions and treatments can cause a weak immune system. These include cancer treatment, bone marrow or organ transplants, and conditions such as HIV or other immune system disorders. If you have moderate or severe COVID-19 or if you have a weak immune system and COVID-19, you will need to isolate through day 10. Day 0 is the first day you tested or had symptoms. If you had severe COVID-19 or have a weak immune system, talk with your provider before you end  isolation.   See the CDC's isolation guidance.   CDC mask guidance after isolation  Follow the CDC's guidance on when to remove your mask after having COVID-19. After you end isolation when you are feeling better, wear your mask:   Through day 10, or  Sooner with testing. If you have 2 negative COVID-19 antigen tests taken 48 hours apart, you may remove your mask sooner than day 10.  For COVID-19 prevention, follow the CDC's guidance and your local community's instructions on face masks.   Follow the CDC's guidance and your local community's instructions on face masks. Everyone ages 2 years and older should correctly wear a well-fitting mask indoors in public in areas where the COVID-19 Community Level is high, even if you are fully vaccinated.  Wear a mask with the best fit, protection, and comfort for you.  You may choose to wear a mask that offers greater protection in certain situations, such as when you are with people at higher risk for severe illness, or if you are at higher risk for severe illness.  See the CDC's mask guidance.    When to call your healthcare provider  Call your healthcare provider right away if a sick person has any of these:   Trouble breathing  Pain or pressure in chest  If a sick person has any of these, call 911:  Trouble breathing that gets worse  Pain or pressure in chest that gets worse  Blue tint to lips or face  Fast or irregular heartbeat  Confusion or trouble waking  Fainting or loss of consciousness  Coughing up blood  Date last modified: 2/22/2023  Audra last reviewed this educational content on 12/1/2021  © 8915-6638 [employer]. All rights reserved. This information is not intended as a substitute for professional medical care. Always follow your healthcare professional's instructions.

## 2023-11-15 ENCOUNTER — PRENATAL OFFICE VISIT (OUTPATIENT)
Dept: FAMILY MEDICINE | Facility: CLINIC | Age: 28
End: 2023-11-15
Payer: COMMERCIAL

## 2023-11-15 VITALS
BODY MASS INDEX: 28.55 KG/M2 | OXYGEN SATURATION: 99 % | TEMPERATURE: 98.7 F | RESPIRATION RATE: 20 BRPM | HEIGHT: 58 IN | DIASTOLIC BLOOD PRESSURE: 62 MMHG | WEIGHT: 136 LBS | SYSTOLIC BLOOD PRESSURE: 99 MMHG | HEART RATE: 67 BPM

## 2023-11-15 DIAGNOSIS — Z34.90 ENCOUNTER FOR SUPERVISION OF NORMAL PREGNANCY, ANTEPARTUM, UNSPECIFIED GRAVIDITY: Primary | ICD-10-CM

## 2023-11-15 LAB
ALBUMIN UR-MCNC: 30 MG/DL
GLUCOSE UR STRIP-MCNC: NEGATIVE MG/DL
KETONES UR STRIP-MCNC: ABNORMAL MG/DL

## 2023-11-15 PROCEDURE — 99207 PR PRENATAL VISIT: CPT | Performed by: FAMILY MEDICINE

## 2023-11-15 PROCEDURE — 87653 STREP B DNA AMP PROBE: CPT | Performed by: FAMILY MEDICINE

## 2023-11-15 PROCEDURE — 81003 URINALYSIS AUTO W/O SCOPE: CPT | Performed by: FAMILY MEDICINE

## 2023-11-15 NOTE — PROGRESS NOTES
No ctx, lof, bleeding, +FM.   Some fatigue, not really remembering her iron.   Her pelvic floor feels tender, no vaginal discharge or odor.   GBS collected today.   Consider PT, defers for now.   Encouraged iron supplementation, consider recheck of Hb in two to three weeks.

## 2023-11-16 LAB — GP B STREP DNA SPEC QL NAA+PROBE: NEGATIVE

## 2023-11-24 ENCOUNTER — PRENATAL OFFICE VISIT (OUTPATIENT)
Dept: FAMILY MEDICINE | Facility: CLINIC | Age: 28
End: 2023-11-24
Payer: COMMERCIAL

## 2023-11-24 VITALS
HEIGHT: 58 IN | WEIGHT: 138 LBS | DIASTOLIC BLOOD PRESSURE: 72 MMHG | OXYGEN SATURATION: 99 % | RESPIRATION RATE: 21 BRPM | SYSTOLIC BLOOD PRESSURE: 110 MMHG | BODY MASS INDEX: 28.97 KG/M2 | HEART RATE: 63 BPM | TEMPERATURE: 97.5 F

## 2023-11-24 DIAGNOSIS — Z34.93 ENCOUNTER FOR SUPERVISION OF NORMAL PREGNANCY IN THIRD TRIMESTER, UNSPECIFIED GRAVIDITY: Primary | ICD-10-CM

## 2023-11-24 PROCEDURE — 81003 URINALYSIS AUTO W/O SCOPE: CPT | Performed by: FAMILY MEDICINE

## 2023-11-24 PROCEDURE — 99207 PR PRENATAL VISIT: CPT | Performed by: FAMILY MEDICINE

## 2023-11-24 NOTE — PROGRESS NOTES
Some intermittent ctx. No lof, bleeding. +FM.   CE as above.   Labor precautions. She notes one prior delivery with SROM, all others with AROM.   Follow up next week.

## 2023-12-01 ENCOUNTER — NURSE TRIAGE (OUTPATIENT)
Dept: NURSING | Facility: CLINIC | Age: 28
End: 2023-12-01
Payer: COMMERCIAL

## 2023-12-01 NOTE — TELEPHONE ENCOUNTER
"Triage call  Patient calling to report  that she has something pushing out of her vagina it feels like it is hanging out.it is not painful and does not itch there is a little whitish discharge.    Per protocol see PCP with in 2 weeks.Care advice given.  Verbalizes understanding and agrees with plan. Transferred to scheduling.    Joseline Finn RN   [facility] Nurse Advisor  11:26 AM 12/1/2023              Reason for Disposition   Feels like something inside is falling out of vagina (e.g., pressure, heaviness, fullness)    Additional Information   Negative: Sounds like a life-threatening emergency to the triager   Negative: Followed a genital area injury (e.g., vagina, vulva)   Negative: Vaginal bleeding is main symptom   Negative: Vaginal discharge is main symptom   Negative: Pain or burning with passing urine (urination) is main symptom   Negative: Menstrual cramps is main symptom   Negative: Abdomen pain is main symptom   Negative: Pubic lice suspected   Negative: Itching or rash of female genital area (e.g., labia, vagina, vulva)   Negative: Pregnant and labor suspected   Negative: Genital area looks infected (e.g., draining sore, spreading redness) and fever   Negative: Something is hanging out of the vagina and can't easily be pushed back inside   Negative: Patient sounds very sick or weak to the triager   Negative: SEVERE pain and not improved 2 hours after pain medicine   Negative: MILD-MODERATE pain and present > 24 hours  (Exception: Chronic pain.)   Negative: Genital area looks infected (e.g., draining sore, spreading redness)   Negative: Rash with painful tiny water blisters   Negative: MODERATE-SEVERE itching (i.e., interferes with school, work, or sleep)   Negative: Rash (e.g., redness, tiny bumps, sore) of genital area and present > 24 hours   Negative: Tender lump (swelling or \"ball\") at vaginal opening   Negative: Symptoms of a yeast infection (i.e., itchy, white discharge, not bad " smelling) and not improved > 3 days following Care Advice   Negative: Vaginal itching and not improved > 3 days following Care Advice   Negative: Vaginal odor (bad smell) not improved > 3 days following Care Advice   Negative: Patient is worried they have a sexually transmitted infection (STI)   Negative: Patient wants to be seen    Protocols used: Vaginal Symptoms-A-OH

## 2023-12-02 ENCOUNTER — HOSPITAL ENCOUNTER (INPATIENT)
Facility: HOSPITAL | Age: 28
LOS: 1 days | Discharge: HOME | End: 2023-12-04
Attending: FAMILY MEDICINE | Admitting: FAMILY MEDICINE
Payer: COMMERCIAL

## 2023-12-02 DIAGNOSIS — D50.8 IRON DEFICIENCY ANEMIA SECONDARY TO INADEQUATE DIETARY IRON INTAKE: ICD-10-CM

## 2023-12-02 DIAGNOSIS — Z30.016 ENCOUNTER FOR INITIAL PRESCRIPTION OF TRANSDERMAL PATCH HORMONAL CONTRACEPTIVE DEVICE: ICD-10-CM

## 2023-12-02 PROBLEM — Z36.89 ENCOUNTER FOR TRIAGE IN PREGNANT PATIENT: Status: ACTIVE | Noted: 2023-12-02

## 2023-12-02 RX ORDER — LIDOCAINE 40 MG/G
CREAM TOPICAL
Status: DISCONTINUED | OUTPATIENT
Start: 2023-12-02 | End: 2023-12-03 | Stop reason: HOSPADM

## 2023-12-02 ASSESSMENT — ACTIVITIES OF DAILY LIVING (ADL)
CHANGE_IN_FUNCTIONAL_STATUS_SINCE_ONSET_OF_CURRENT_ILLNESS/INJURY: NO
FALL_HISTORY_WITHIN_LAST_SIX_MONTHS: NO
DIFFICULTY_EATING/SWALLOWING: NO
WALKING_OR_CLIMBING_STAIRS_DIFFICULTY: NO
TOILETING_ISSUES: NO
DRESSING/BATHING_DIFFICULTY: NO
CONCENTRATING,_REMEMBERING_OR_MAKING_DECISIONS_DIFFICULTY: NO
DOING_ERRANDS_INDEPENDENTLY_DIFFICULTY: NO
HEARING_DIFFICULTY_OR_DEAF: NO
WEAR_GLASSES_OR_BLIND: NO
DIFFICULTY_COMMUNICATING: NO

## 2023-12-03 PROBLEM — D50.8 IRON DEFICIENCY ANEMIA SECONDARY TO INADEQUATE DIETARY IRON INTAKE: Status: ACTIVE | Noted: 2023-12-03

## 2023-12-03 PROBLEM — D62 ANEMIA DUE TO BLOOD LOSS, ACUTE: Status: RESOLVED | Noted: 2022-12-29 | Resolved: 2023-12-03

## 2023-12-03 PROBLEM — O42.90 DELAYED DELIVERY AFTER SROM (SPONTANEOUS RUPTURE OF MEMBRANES): Status: ACTIVE | Noted: 2022-12-27

## 2023-12-03 PROBLEM — Z34.90 PREGNANT: Status: ACTIVE | Noted: 2023-12-03

## 2023-12-03 LAB
ABO/RH(D): NORMAL
ANTIBODY SCREEN: NEGATIVE
HGB BLD-MCNC: 8.7 G/DL (ref 11.7–15.7)
HOLD SPECIMEN: NORMAL
SPECIMEN EXPIRATION DATE: NORMAL
T PALLIDUM AB SER QL: NONREACTIVE

## 2023-12-03 PROCEDURE — 258N000003 HC RX IP 258 OP 636: Performed by: FAMILY MEDICINE

## 2023-12-03 PROCEDURE — 86850 RBC ANTIBODY SCREEN: CPT | Performed by: FAMILY MEDICINE

## 2023-12-03 PROCEDURE — 86780 TREPONEMA PALLIDUM: CPT | Performed by: FAMILY MEDICINE

## 2023-12-03 PROCEDURE — 86901 BLOOD TYPING SEROLOGIC RH(D): CPT | Performed by: FAMILY MEDICINE

## 2023-12-03 PROCEDURE — 36415 COLL VENOUS BLD VENIPUNCTURE: CPT | Performed by: FAMILY MEDICINE

## 2023-12-03 PROCEDURE — 250N000009 HC RX 250: Performed by: FAMILY MEDICINE

## 2023-12-03 PROCEDURE — 250N000011 HC RX IP 250 OP 636: Mod: JZ | Performed by: FAMILY MEDICINE

## 2023-12-03 PROCEDURE — 120N000001 HC R&B MED SURG/OB

## 2023-12-03 PROCEDURE — 250N000013 HC RX MED GY IP 250 OP 250 PS 637: Performed by: FAMILY MEDICINE

## 2023-12-03 PROCEDURE — 10907ZC DRAINAGE OF AMNIOTIC FLUID, THERAPEUTIC FROM PRODUCTS OF CONCEPTION, VIA NATURAL OR ARTIFICIAL OPENING: ICD-10-PCS | Performed by: FAMILY MEDICINE

## 2023-12-03 PROCEDURE — 59400 OBSTETRICAL CARE: CPT | Performed by: FAMILY MEDICINE

## 2023-12-03 PROCEDURE — 722N000001 HC LABOR CARE VAGINAL DELIVERY SINGLE

## 2023-12-03 PROCEDURE — 85018 HEMOGLOBIN: CPT | Performed by: FAMILY MEDICINE

## 2023-12-03 RX ORDER — KETOROLAC TROMETHAMINE 30 MG/ML
30 INJECTION, SOLUTION INTRAMUSCULAR; INTRAVENOUS
Status: DISCONTINUED | OUTPATIENT
Start: 2023-12-03 | End: 2023-12-04 | Stop reason: HOSPADM

## 2023-12-03 RX ORDER — METOCLOPRAMIDE HYDROCHLORIDE 5 MG/ML
10 INJECTION INTRAMUSCULAR; INTRAVENOUS EVERY 6 HOURS PRN
Status: DISCONTINUED | OUTPATIENT
Start: 2023-12-03 | End: 2023-12-03 | Stop reason: HOSPADM

## 2023-12-03 RX ORDER — PROCHLORPERAZINE 25 MG
25 SUPPOSITORY, RECTAL RECTAL EVERY 12 HOURS PRN
Status: DISCONTINUED | OUTPATIENT
Start: 2023-12-03 | End: 2023-12-03 | Stop reason: HOSPADM

## 2023-12-03 RX ORDER — LIDOCAINE HYDROCHLORIDE 10 MG/ML
INJECTION, SOLUTION EPIDURAL; INFILTRATION; INTRACAUDAL; PERINEURAL
Status: DISPENSED
Start: 2023-12-03 | End: 2023-12-03

## 2023-12-03 RX ORDER — OXYTOCIN/0.9 % SODIUM CHLORIDE 30/500 ML
340 PLASTIC BAG, INJECTION (ML) INTRAVENOUS CONTINUOUS PRN
Status: DISCONTINUED | OUTPATIENT
Start: 2023-12-03 | End: 2023-12-04 | Stop reason: HOSPADM

## 2023-12-03 RX ORDER — TRANEXAMIC ACID 10 MG/ML
1 INJECTION, SOLUTION INTRAVENOUS EVERY 30 MIN PRN
Status: DISCONTINUED | OUTPATIENT
Start: 2023-12-03 | End: 2023-12-04 | Stop reason: HOSPADM

## 2023-12-03 RX ORDER — MISOPROSTOL 200 UG/1
400 TABLET ORAL
Status: DISCONTINUED | OUTPATIENT
Start: 2023-12-03 | End: 2023-12-03 | Stop reason: HOSPADM

## 2023-12-03 RX ORDER — OXYTOCIN 10 [USP'U]/ML
10 INJECTION, SOLUTION INTRAMUSCULAR; INTRAVENOUS
Status: DISCONTINUED | OUTPATIENT
Start: 2023-12-03 | End: 2023-12-03 | Stop reason: HOSPADM

## 2023-12-03 RX ORDER — CARBOPROST TROMETHAMINE 250 UG/ML
250 INJECTION, SOLUTION INTRAMUSCULAR
Status: DISCONTINUED | OUTPATIENT
Start: 2023-12-03 | End: 2023-12-04 | Stop reason: HOSPADM

## 2023-12-03 RX ORDER — NALOXONE HYDROCHLORIDE 0.4 MG/ML
0.4 INJECTION, SOLUTION INTRAMUSCULAR; INTRAVENOUS; SUBCUTANEOUS
Status: DISCONTINUED | OUTPATIENT
Start: 2023-12-03 | End: 2023-12-03 | Stop reason: HOSPADM

## 2023-12-03 RX ORDER — HYDROCORTISONE 25 MG/G
CREAM TOPICAL 3 TIMES DAILY PRN
Status: DISCONTINUED | OUTPATIENT
Start: 2023-12-03 | End: 2023-12-04 | Stop reason: HOSPADM

## 2023-12-03 RX ORDER — PROCHLORPERAZINE MALEATE 10 MG
10 TABLET ORAL EVERY 6 HOURS PRN
Status: DISCONTINUED | OUTPATIENT
Start: 2023-12-03 | End: 2023-12-03 | Stop reason: HOSPADM

## 2023-12-03 RX ORDER — OXYTOCIN/0.9 % SODIUM CHLORIDE 30/500 ML
1-24 PLASTIC BAG, INJECTION (ML) INTRAVENOUS CONTINUOUS
Status: DISCONTINUED | OUTPATIENT
Start: 2023-12-03 | End: 2023-12-03 | Stop reason: HOSPADM

## 2023-12-03 RX ORDER — MODIFIED LANOLIN
OINTMENT (GRAM) TOPICAL
Status: DISCONTINUED | OUTPATIENT
Start: 2023-12-03 | End: 2023-12-04 | Stop reason: HOSPADM

## 2023-12-03 RX ORDER — TRANEXAMIC ACID 10 MG/ML
1 INJECTION, SOLUTION INTRAVENOUS EVERY 30 MIN PRN
Status: DISCONTINUED | OUTPATIENT
Start: 2023-12-03 | End: 2023-12-03 | Stop reason: HOSPADM

## 2023-12-03 RX ORDER — METHYLERGONOVINE MALEATE 0.2 MG/ML
200 INJECTION INTRAVENOUS
Status: DISCONTINUED | OUTPATIENT
Start: 2023-12-03 | End: 2023-12-03 | Stop reason: HOSPADM

## 2023-12-03 RX ORDER — OXYTOCIN/0.9 % SODIUM CHLORIDE 30/500 ML
340 PLASTIC BAG, INJECTION (ML) INTRAVENOUS CONTINUOUS PRN
Status: DISCONTINUED | OUTPATIENT
Start: 2023-12-03 | End: 2023-12-03 | Stop reason: HOSPADM

## 2023-12-03 RX ORDER — METHYLPREDNISOLONE SODIUM SUCCINATE 125 MG/2ML
125 INJECTION, POWDER, LYOPHILIZED, FOR SOLUTION INTRAMUSCULAR; INTRAVENOUS
Status: DISCONTINUED | OUTPATIENT
Start: 2023-12-03 | End: 2023-12-04 | Stop reason: HOSPADM

## 2023-12-03 RX ORDER — CITRIC ACID/SODIUM CITRATE 334-500MG
30 SOLUTION, ORAL ORAL
Status: DISCONTINUED | OUTPATIENT
Start: 2023-12-03 | End: 2023-12-03 | Stop reason: HOSPADM

## 2023-12-03 RX ORDER — FERROUS GLUCONATE 324(38)MG
324 TABLET ORAL
Status: DISCONTINUED | OUTPATIENT
Start: 2023-12-03 | End: 2023-12-04 | Stop reason: HOSPADM

## 2023-12-03 RX ORDER — ACETAMINOPHEN 325 MG/1
650 TABLET ORAL EVERY 4 HOURS PRN
Status: DISCONTINUED | OUTPATIENT
Start: 2023-12-03 | End: 2023-12-04 | Stop reason: HOSPADM

## 2023-12-03 RX ORDER — LIDOCAINE 40 MG/G
CREAM TOPICAL
Status: DISCONTINUED | OUTPATIENT
Start: 2023-12-03 | End: 2023-12-03 | Stop reason: HOSPADM

## 2023-12-03 RX ORDER — ONDANSETRON 2 MG/ML
4 INJECTION INTRAMUSCULAR; INTRAVENOUS EVERY 6 HOURS PRN
Status: DISCONTINUED | OUTPATIENT
Start: 2023-12-03 | End: 2023-12-03 | Stop reason: HOSPADM

## 2023-12-03 RX ORDER — DOCUSATE SODIUM 100 MG/1
100 CAPSULE, LIQUID FILLED ORAL DAILY
Status: DISCONTINUED | OUTPATIENT
Start: 2023-12-03 | End: 2023-12-04 | Stop reason: HOSPADM

## 2023-12-03 RX ORDER — ONDANSETRON 4 MG/1
4 TABLET, ORALLY DISINTEGRATING ORAL EVERY 6 HOURS PRN
Status: DISCONTINUED | OUTPATIENT
Start: 2023-12-03 | End: 2023-12-03 | Stop reason: HOSPADM

## 2023-12-03 RX ORDER — OXYTOCIN/0.9 % SODIUM CHLORIDE 30/500 ML
100-340 PLASTIC BAG, INJECTION (ML) INTRAVENOUS CONTINUOUS PRN
Status: DISCONTINUED | OUTPATIENT
Start: 2023-12-03 | End: 2023-12-04 | Stop reason: HOSPADM

## 2023-12-03 RX ORDER — IBUPROFEN 800 MG/1
800 TABLET, FILM COATED ORAL
Status: DISCONTINUED | OUTPATIENT
Start: 2023-12-03 | End: 2023-12-04 | Stop reason: HOSPADM

## 2023-12-03 RX ORDER — MISOPROSTOL 200 UG/1
800 TABLET ORAL
Status: DISCONTINUED | OUTPATIENT
Start: 2023-12-03 | End: 2023-12-03 | Stop reason: HOSPADM

## 2023-12-03 RX ORDER — NALOXONE HYDROCHLORIDE 0.4 MG/ML
0.2 INJECTION, SOLUTION INTRAMUSCULAR; INTRAVENOUS; SUBCUTANEOUS
Status: DISCONTINUED | OUTPATIENT
Start: 2023-12-03 | End: 2023-12-03 | Stop reason: HOSPADM

## 2023-12-03 RX ORDER — OXYTOCIN 10 [USP'U]/ML
10 INJECTION, SOLUTION INTRAMUSCULAR; INTRAVENOUS
Status: DISCONTINUED | OUTPATIENT
Start: 2023-12-03 | End: 2023-12-04 | Stop reason: HOSPADM

## 2023-12-03 RX ORDER — SODIUM CHLORIDE, SODIUM LACTATE, POTASSIUM CHLORIDE, CALCIUM CHLORIDE 600; 310; 30; 20 MG/100ML; MG/100ML; MG/100ML; MG/100ML
INJECTION, SOLUTION INTRAVENOUS CONTINUOUS PRN
Status: DISCONTINUED | OUTPATIENT
Start: 2023-12-03 | End: 2023-12-03 | Stop reason: HOSPADM

## 2023-12-03 RX ORDER — METHYLERGONOVINE MALEATE 0.2 MG/ML
200 INJECTION INTRAVENOUS
Status: DISCONTINUED | OUTPATIENT
Start: 2023-12-03 | End: 2023-12-04 | Stop reason: HOSPADM

## 2023-12-03 RX ORDER — IBUPROFEN 800 MG/1
800 TABLET, FILM COATED ORAL EVERY 6 HOURS PRN
Status: DISCONTINUED | OUTPATIENT
Start: 2023-12-03 | End: 2023-12-04 | Stop reason: HOSPADM

## 2023-12-03 RX ORDER — METOCLOPRAMIDE 10 MG/1
10 TABLET ORAL EVERY 6 HOURS PRN
Status: DISCONTINUED | OUTPATIENT
Start: 2023-12-03 | End: 2023-12-03 | Stop reason: HOSPADM

## 2023-12-03 RX ORDER — CARBOPROST TROMETHAMINE 250 UG/ML
250 INJECTION, SOLUTION INTRAMUSCULAR
Status: DISCONTINUED | OUTPATIENT
Start: 2023-12-03 | End: 2023-12-03 | Stop reason: HOSPADM

## 2023-12-03 RX ORDER — MISOPROSTOL 200 UG/1
800 TABLET ORAL
Status: DISCONTINUED | OUTPATIENT
Start: 2023-12-03 | End: 2023-12-04 | Stop reason: HOSPADM

## 2023-12-03 RX ORDER — DIPHENHYDRAMINE HYDROCHLORIDE 50 MG/ML
50 INJECTION INTRAMUSCULAR; INTRAVENOUS
Status: DISCONTINUED | OUTPATIENT
Start: 2023-12-03 | End: 2023-12-04 | Stop reason: HOSPADM

## 2023-12-03 RX ORDER — BISACODYL 10 MG
10 SUPPOSITORY, RECTAL RECTAL DAILY PRN
Status: DISCONTINUED | OUTPATIENT
Start: 2023-12-03 | End: 2023-12-04 | Stop reason: HOSPADM

## 2023-12-03 RX ORDER — MISOPROSTOL 200 UG/1
400 TABLET ORAL
Status: DISCONTINUED | OUTPATIENT
Start: 2023-12-03 | End: 2023-12-04 | Stop reason: HOSPADM

## 2023-12-03 RX ADMIN — SODIUM CHLORIDE, POTASSIUM CHLORIDE, SODIUM LACTATE AND CALCIUM CHLORIDE: 600; 310; 30; 20 INJECTION, SOLUTION INTRAVENOUS at 06:26

## 2023-12-03 RX ADMIN — KETOROLAC TROMETHAMINE 30 MG: 30 INJECTION, SOLUTION INTRAMUSCULAR; INTRAVENOUS at 08:32

## 2023-12-03 RX ADMIN — Medication 2 MILLI-UNITS/MIN: at 06:27

## 2023-12-03 RX ADMIN — FERROUS GLUCONATE 324 MG: 324 TABLET ORAL at 11:23

## 2023-12-03 RX ADMIN — ACETAMINOPHEN 650 MG: 325 TABLET ORAL at 12:27

## 2023-12-03 RX ADMIN — TRANEXAMIC ACID 1 G: 10 INJECTION, SOLUTION INTRAVENOUS at 06:59

## 2023-12-03 RX ADMIN — DOCUSATE SODIUM 100 MG: 100 CAPSULE, LIQUID FILLED ORAL at 08:36

## 2023-12-03 ASSESSMENT — ACTIVITIES OF DAILY LIVING (ADL)
ADLS_ACUITY_SCORE: 18

## 2023-12-03 NOTE — PROGRESS NOTES
Data: Patient presented to [facility]: 2023 11:14 PM.  Reason for maternal/fetal assessment is leaking vaginal fluid. Patient reports she thinks her water broke at 2200 and that it was clear. Patient denies uterine contractions, vaginal bleeding, abdominal pain, pelvic pressure, nausea, vomiting, headache, visual disturbances, epigastric or RUQ pain, significant edema. Patient reports fetal movement is normal. Patient is a 38w2d . Prenatal record reviewed. Pregnancy has been uncomplicated. Support person is present.     Fetal HR baseline was 130, variability is moderate (amplitude range 6 to 25 bpm). Accelerations:  . Decelerations: absent. Uterine assessment is mild by palpation during contractions and soft by palpation at rest. Cervix 5 cm dilated and 60% effaced. Fetal station -3. Fetal presentation cephalic per cervical exam. Membranes: fluid visually apparent externally.    Vital signs wnl. Patient reports no pain and is coping.     Action: Verbal consent for EFM. Triage assessment completed. Patient does not meet criteria for early labor discharge.     Response: Patient verbalized agreement with plan. Dr. Carvajal updated with the above information, orders received.

## 2023-12-03 NOTE — PROGRESS NOTES
Patient still not feeling contractions. Dr. Carvajal called for an update, orders to start pitocin. Patient agreeable.

## 2023-12-03 NOTE — PLAN OF CARE
"Patient delivered infant girl at 0749, nuchalx3. Patient received TXA prior to delivery. No repairs. Fundus firm at U/2, lochia scant. IV pitocin infusing. Pain well controled. Patient is formula feeding, infant on blood sugar regime for SGA.     Problem: Adult Inpatient Plan of Care  Goal: Patient-Specific Goal (Individualized)  Description: You can add care plan individualizations to a care plan. Examples of Individualization might be:  \"Parent requests to be called daily at 9am for status\", \"I have a hard time hearing out of my right ear\", or \"Do not touch me to wake me up as it startles  me\".  12/3/2023 0905 by Tiffanie Williamson RN  Outcome: Met  12/3/2023 0904 by Tiffanie Williamson RN  Outcome: Progressing  Goal: Absence of Hospital-Acquired Illness or Injury  12/3/2023 0905 by Tiffanie Williamson RN  Outcome: Met  12/3/2023 0904 by Tiffanie Williamson RN  Outcome: Progressing     Problem: Labor  Goal: Hemostasis  12/3/2023 0905 by Tiffanie Williamson RN  Outcome: Met  12/3/2023 0904 by Tiffanie Williamson RN  Outcome: Progressing  Goal: Stable Fetal Wellbeing  12/3/2023 0905 by Tiffanie Williamson RN  Outcome: Met  12/3/2023 0904 by Tiffanie Williamson RN  Outcome: Progressing  Goal: Effective Progression to Delivery  12/3/2023 0905 by Tiffanie Williamson RN  Outcome: Met  12/3/2023 0904 by Tiffanie Williamson RN  Outcome: Progressing  Goal: Absence of Infection Signs and Symptoms  12/3/2023 0905 by Tiffanie Williamson RN  Outcome: Met  12/3/2023 0904 by Tiffanie Williamson RN  Outcome: Progressing  Goal: Acceptable Pain Control  12/3/2023 0905 by Tiffanie Williamson RN  Outcome: Met  12/3/2023 0904 by Tiffanie Williamson RN  Outcome: Progressing  Goal: Normal Uterine Contraction Pattern  12/3/2023 0905 by Tiffanie Williamson RN  Outcome: Met  12/3/2023 0904 by Tiffanie Williamson RN  Outcome: Progressing   Goal Outcome Evaluation:                        "

## 2023-12-03 NOTE — PROGRESS NOTES
Patient states she is not feeling any contractions still, just that her tailbone is sore. Will call RN when she starts to feel contractions.

## 2023-12-03 NOTE — H&P
"[facility] Labor and Delivery History and Physical    Artie Byrnes MRN# [id number]   Age: 28 year old YOB: 1995     Date of Admission:  2023    Primary care provider: Jose J Carvajal           Chief Complaint:   Artie Byrnes is a 28 year old female who is 38w2d pregnant and being admitted for active labor management and SROM at home.   She noted gush of fluid at 10 pm on 23. She has some contractions that are regular. Reports normal fetal movement.           Pregnancy history:     OBSTETRIC HISTORY:    OB History    Para Term  AB Living   7 4 4 0 2 4   SAB IAB Ectopic Multiple Live Births   2 0 0 0 4      # Outcome Date GA Lbr Markos/2nd Weight Sex Delivery Anes PTL Lv   7 Current            6 Term 22 38w5d 03:00 / 00:02 2.72 kg (5 lb 15.9 oz) F Vag-Spont None N CHRIS      Name: SONIYAFEMALE-PA      Apgar1: 9  Apgar5: 9   5 SAB 22 10w6d    SAB         Birth Comments: Spontaneous miscarriage. Uncomplicated.   4 Term 21 39w2d 06:50 / 00:01 3.09 kg (6 lb 13 oz) F Vag-Spont None N CHRIS      Name: JALIL BYRNES-PA      Apgar1: 6  Apgar5: 9   3 SAB 20 11w6d    SAB         Birth Comments: missed SAB, needed D&C   2 Term 19 39w2d  2.801 kg (6 lb 2.8 oz) F Vag-Spont None N CRHIS      Name: Lance      Apgar1: 8  Apgar5: 9   1 Term 18 38w0d 02:25 / 00:05 2.28 kg (5 lb 0.4 oz) F Vag-Spont Local N CHRIS      Name: Kit      Apgar1: 8  Apgar5: 9       EDC: Estimated Date of Delivery: 12/15/23    Prenatal Labs:   Lab Results   Component Value Date    AS Negative 2023    HEPBANG Nonreactive 2023    GCPCRT Negative 2020    HGB 8.7 (L) 2023       GBS Status:   No results found for: \"GBS\"    Active Problem List  Patient Active Problem List   Diagnosis    Asymmetrical Sensorineural Hearing Loss    Scoliosis    Anemia due to blood loss, acute    19 weeks gestation of pregnancy    Encounter for triage in pregnant patient    Pregnant "       Medication Prior to Admission  Medications Prior to Admission   Medication Sig Dispense Refill Last Dose    ferrous gluconate (FERGON) 324 (38 Fe) MG tablet Take 1 tablet (324 mg) by mouth daily (with breakfast) 30 tablet 3     Prenatal MV-Min-Fe Fum-FA-DHA (PRENATAL MULTIVITAMIN PLUS DHA) 27-0.8-250 MG CAPS Take 1 tablet by mouth daily OK to substitute formulary equivalent 100 capsule 3    .        Maternal Past Medical History:     Past Medical History:   Diagnosis Date    Anemia     borderline    Delayed delivery after SROM (spontaneous rupture of membranes) 12/27/2022    Normal vaginal delivery 11/30/2019    Delivered on the toilet.  Coupling noted.  arom of clear fluid.  No meds.    Received cytotec and pitocin for bleeding.         Urinary tract infection     Vaginal delivery 11/30/2019    Delivered on the toilet.  Coupling noted.  arom of clear fluid.  No meds.  Received cytotec and pitocin for bleeding.      Vaginal delivery 11/12/2018    Unmedicated.  srom of clear fluid.  Delivered quickly after this.  Left labial laceration repaired.                         Family History:     Family History   Problem Relation Age of Onset    Multiple births Mother         Pa is a twin (probably fraternal)    No Known Problems Father     No Known Problems Sister     No Known Problems Sister     No Known Problems Sister     No Known Problems Brother     No Known Problems Brother     No Known Problems Brother     Autism Spectrum Disorder Nephew      Family history reviewed and updated in [facility]            Social History:     Social History     Socioeconomic History    Marital status: Single     Spouse name: Not on file    Number of children: Not on file    Years of education: Not on file    Highest education level: Not on file   Occupational History    Not on file   Tobacco Use    Smoking status: Never     Passive exposure: Never    Smokeless tobacco: Never    Tobacco comments:     no passive exposure   Vaping Use     Vaping Use: Never used   Substance and Sexual Activity    Alcohol use: No    Drug use: No    Sexual activity: Yes     Partners: Male     Birth control/protection: None   Other Topics Concern    Parent/sibling w/ CABG, MI or angioplasty before 65F 55M? No   Social History Narrative    Not on file     Social Determinants of Health     Financial Resource Strain: Low Risk  (11/24/2023)    Financial Resource Strain     Within the past 12 months, have you or your family members you live with been unable to get utilities (heat, electricity) when it was really needed?: No   Food Insecurity: Low Risk  (11/24/2023)    Food Insecurity     Within the past 12 months, did you worry that your food would run out before you got money to buy more?: No     Within the past 12 months, did the food you bought just not last and you didn t have money to get more?: No   Transportation Needs: Low Risk  (11/24/2023)    Transportation Needs     Within the past 12 months, has lack of transportation kept you from medical appointments, getting your medicines, non-medical meetings or appointments, work, or from getting things that you need?: No   Physical Activity: Not on file   Stress: Not on file   Social Connections: Not on file   Interpersonal Safety: Low Risk  (10/3/2023)    Interpersonal Safety     Do you feel physically and emotionally safe where you currently live?: Yes     Within the past 12 months, have you been hit, slapped, kicked or otherwise physically hurt by someone?: No     Within the past 12 months, have you been humiliated or emotionally abused in other ways by your partner or ex-partner?: No   Housing Stability: Low Risk  (11/24/2023)    Housing Stability     Do you have housing? : Yes     Are you worried about losing your housing?: No            Review of Systems:   CONSTITUTIONAL: NEGATIVE for fever, chills, change in weight  ENT/MOUTH: NEGATIVE for ear, mouth and throat problems  RESP: NEGATIVE for significant cough or  SOB  CV: NEGATIVE for chest pain, palpitations or peripheral edema          Physical Exam:   Vitals were reviewed  Temp: 98.4  F (36.9  C) Temp src: Oral BP: 118/72 Pulse: 68   Resp: 18 SpO2: 98 %      Constitutional:   awake, alert, cooperative, no apparent distress, and appears stated age     Abdomen:   No scars, normal bowel sounds, soft, non-distended, non-tender, no masses palpated, no hepatosplenomegally     Neurologic:   Awake, alert, oriented to name, place and time.  Cranial nerves II-XII are grossly intact.  Motor is 5 out of 5 bilaterally.  Cerebellar finger to nose, heel to shin intact.  Sensory is intact.  Babinski down going, Romberg negative, and gait is normal.     Skin:   no bruising or bleeding      Cervix:   Membranes: SROM   Dilation: 5   Effacement: 50%   Station:-3   Consistency: soft   Position: Mid  Presentation:Cephalic  Fetal Heart Rate Tracing: reactive and reassuring  Tocometer: external monitor                       Assessment:   Artie Rapp is a 38w2d pregnant female admitted with SROM.  Patient Active Problem List   Diagnosis    Asymmetrical Sensorineural Hearing Loss    Scoliosis    Delayed delivery after SROM (spontaneous rupture of membranes)    Anemia              Plan:   Admit - see IP orders  Worsening anemia, consider venofer before discharge.   Pain medication as desired.   GBS negative.   Breast and bottle feeding.   Consider augmentation of labor if ineffective contraction pattern.  Anticipate     Jose J Carvajal MD

## 2023-12-04 VITALS
WEIGHT: 131.8 LBS | DIASTOLIC BLOOD PRESSURE: 62 MMHG | TEMPERATURE: 97.7 F | HEIGHT: 58 IN | RESPIRATION RATE: 18 BRPM | OXYGEN SATURATION: 97 % | BODY MASS INDEX: 27.66 KG/M2 | SYSTOLIC BLOOD PRESSURE: 109 MMHG | HEART RATE: 59 BPM

## 2023-12-04 PROBLEM — O36.5990 FETAL GROWTH RESTRICTION ANTEPARTUM: Status: ACTIVE | Noted: 2023-12-04

## 2023-12-04 PROBLEM — D62 ANEMIA DUE TO BLOOD LOSS, ACUTE: Status: ACTIVE | Noted: 2023-12-04

## 2023-12-04 PROCEDURE — 258N000003 HC RX IP 258 OP 636: Performed by: FAMILY MEDICINE

## 2023-12-04 PROCEDURE — 250N000011 HC RX IP 250 OP 636: Performed by: FAMILY MEDICINE

## 2023-12-04 PROCEDURE — 250N000013 HC RX MED GY IP 250 OP 250 PS 637: Performed by: FAMILY MEDICINE

## 2023-12-04 PROCEDURE — 99207 PR SUBSEQUENT HOSPITAL CARE FOR MOTHER, 15 MINUTES: CPT | Performed by: FAMILY MEDICINE

## 2023-12-04 RX ORDER — FERROUS GLUCONATE 324(38)MG
324 TABLET ORAL EVERY OTHER DAY
Qty: 100 TABLET | Refills: 0 | Status: SHIPPED | OUTPATIENT
Start: 2023-12-04

## 2023-12-04 RX ORDER — AMOXICILLIN 250 MG
1 CAPSULE ORAL DAILY PRN
Qty: 50 TABLET | Refills: 1 | Status: SHIPPED | OUTPATIENT
Start: 2023-12-04

## 2023-12-04 RX ORDER — NORELGESTROMIN AND ETHINYL ESTRADIOL 35; 150 UG/MG; UG/MG
PATCH TRANSDERMAL
Qty: 9 PATCH | Refills: 3 | Status: SHIPPED | OUTPATIENT
Start: 2023-12-04 | End: 2024-03-01

## 2023-12-04 RX ORDER — IBUPROFEN 600 MG/1
600 TABLET, FILM COATED ORAL EVERY 8 HOURS PRN
Qty: 50 TABLET | Refills: 1 | Status: SHIPPED | OUTPATIENT
Start: 2023-12-04

## 2023-12-04 RX ADMIN — IRON SUCROSE 300 MG: 20 INJECTION, SOLUTION INTRAVENOUS at 00:15

## 2023-12-04 RX ADMIN — FERROUS GLUCONATE 324 MG: 324 TABLET ORAL at 08:26

## 2023-12-04 RX ADMIN — DOCUSATE SODIUM 100 MG: 100 CAPSULE, LIQUID FILLED ORAL at 08:26

## 2023-12-04 ASSESSMENT — ACTIVITIES OF DAILY LIVING (ADL)
ADLS_ACUITY_SCORE: 18

## 2023-12-04 NOTE — DISCHARGE SUMMARY
Maternal Discharge Summary  [facility]  Date of Service: 2023    Name      Artie CARRILLO       1995  MRN       [id number]  PCP        Dr. Jose J Carvajal at [facility].  ________________________________________________________________________    Assessment:  28 year old  s/p vaginal, spontaneous  at 38w2d on 12/3/2023 .    Discharge Plan:   Discharge to Home. Condition at discharge: Stable.  Physical activity: Regular  Diet: Regular  Home care nurse: ordered for within 48 hours  Lactation clinic appointment: ordered for as needed  Contraception plan: Patch.  Follow up with Dr. Jose J Carvajal at [facility] [address] / [place] [address] in 2 weeks and 6 weeks for routine postpartum care.  Future Appointments   Date Time Provider Department Center   2023  3:00 PM [facility]   2023  3:20 PM Jose J Carvajal MD [facility]   2023  3:20 PM [facility]      ________________________________________________________________________    Admission Date:  2023  Delivery Date:  12/3/2023  7:59 AM    Discharge Date:  2023    Delivery: Vaginal, Spontaneous  at 38w2d    Principal Problem:    Encounter for triage in pregnant patient  Active Problems:    Normal vaginal delivery    Delayed delivery after SROM (spontaneous rupture of membranes)    Pregnant    Iron deficiency anemia secondary to inadequate dietary iron intake    Subjective:  Patient denies headache, dizziness, dyspnea, and leg pain. Ambulating and eating.    Discharge Exam:  Patient Vitals for the past 24 hrs:   BP Temp Temp src Pulse Resp SpO2   23 0246 106/60 98.5  F (36.9  C) Oral 73 18 97 %   23 0120 111/57 -- -- 64 18 --   23 0050 111/63 -- -- 76 -- --   23 0040 108/61 -- -- 66 20 --   23 0024 111/62 98.3  F (36.8  C) Oral 65 20 --   23 0011 114/55 98.3  F (36.8  C) Oral 68 20  --   12/03/23 2150 103/49 98.4  F (36.9  C) Oral 54 16 97 %   12/03/23 1757 111/49 98.5  F (36.9  C) Oral 58 16 97 %   12/03/23 1409 110/55 98.5  F (36.9  C) Oral -- 14 --   12/03/23 0955 100/56 -- -- -- 18 --   12/03/23 0954 -- 98.3  F (36.8  C) Oral -- -- --   12/03/23 0941 98/54 -- -- -- 16 --   12/03/23 0926 102/56 -- -- -- 14 --   12/03/23 0912 135/60 -- -- -- 18 --   12/03/23 0856 98/58 -- -- -- 18 --   12/03/23 0841 99/56 98.4  F (36.9  C) Oral -- 16 --   12/03/23 0826 101/58 -- -- -- 16 --   12/03/23 0807 99/51 -- -- 72 16 --   12/03/23 0720 117/67 98  F (36.7  C) Oral -- -- --     General - alert, comfortable  Heart - RRR, no murmurs  Lungs - CTA bilaterally  Abdomen - fundus firm, nontender, below umbilicus  Extremities - trace edema  Recent Results (from the past 120 hour(s))   Hemoglobin    Collection Time: 12/03/23 12:56 AM   Result Value Ref Range    Hemoglobin 8.7 (L) 11.7 - 15.7 g/dL   Treponema Abs w Reflex to RPR and Titer    Collection Time: 12/03/23 12:56 AM   Result Value Ref Range    Treponema Antibody Total Nonreactive Nonreactive   Adult Type and Screen    Collection Time: 12/03/23 12:56 AM   Result Value Ref Range    ABO/RH(D) B POS     Antibody Screen Negative Negative    SPECIMEN EXPIRATION DATE [id number]    Extra Blue Top Tube    Collection Time: 12/03/23 12:56 AM   Result Value Ref Range    Hold Specimen [facility]       Hemoglobin   Date Value Ref Range Status   12/03/2023 8.7 (L) 11.7 - 15.7 g/dL Final   10/03/2023 10.1 (L) 11.7 - 15.7 g/dL Final   07/25/2023 10.4 (L) 11.7 - 15.7 g/dL Final       Discharge Medications: See medication reconciliation.     Completed by:   Lynda Mariee MD  [facility]  12/4/2023 7:08 AM   used: Not needed.

## 2023-12-04 NOTE — PROGRESS NOTES
[facility] RN Care Coordinator Note    Artie Rapp  [id number]  1995    Chart reviewed, discharge follow-up plan discussed with patient's bedside RN, needs assessed. Patient requests all follow-up through clinic, declines home care visit; [facility] Intake updated by this writer. Post-delivery follow-up appointments with  planned in 2 & 6 weeks at [facility].     RN Care Coordinator will continue to follow and assist if needed with discharge plan.

## 2023-12-04 NOTE — PLAN OF CARE
Postpartum assessment within normal limits. Pa is up ad bozena. Voiding without difficulty. Pa denies pain. Attentive to infant.     Problem: Postpartum (Vaginal Delivery)  Goal: Hemostasis  Outcome: Progressing  Problem: Postpartum (Vaginal Delivery)  Goal: Absence of Infection Signs and Symptoms  Outcome: Progressing    Khalida Cleveland RN on 12/4/2023 at 11:41 AM

## 2023-12-04 NOTE — L&D DELIVERY NOTE
OB Vaginal Delivery Note    Artie Rapp MRN# [id number]   Age: 28 year old YOB: 1995       GA: 38w2d  GP:   Labor Complications: None   EBL:   mL  Delivery QBL: 50 mL  Delivery Type: Vaginal, Spontaneous   ROM to Delivery Time: (Delivered) Hours: 9 Minutes: 59  [place] Weight: 2.3 kg (5 lb 1.1 oz)    1 Minute 5 Minute 10 Minute   Apgar Totals: 8   9        SAY CARTER;KRISTOFER NICOLAS     Delivery Details:  Artie Rapp, a 28 year old  female delivered a viable infant with apgars of 8  and 9 . Patient was fully dilated and pushing after 7  hours 58  minutes in active labor. Delivery was via vaginal, spontaneous  to a sterile field under none  anesthesia. Infant delivered in vertex  right  occiput  anterior  position. Anterior and posterior shoulders delivered without difficulty. The cord was clamped, cut twice and 3 vessels  were noted. Cord blood was obtained in routine fashion with the following disposition: discard .      Cord complications: nuchal x 3  Placenta delivered at 12/3/2023  8:06 AM . Placental disposition was Hospital disposal . Fundal massage performed and fundus found to be firm.     Episiotomy: none    Perineum, vagina, cervix were inspected, and the following lacerations were noted:   Perineal lacerations: none       Excellent hemostasis was noted. Needle count correct. Infant and patient in delivery room in good and stable condition.        Dionte, Female-Pa [[id number]]      Labor Event Times          Active labor onset date: 23 Onset time: 11:57 PM   Dilation complete date: 12/3/23 Complete time:  7:55 AM   Start pushing date/time: 12/3/2023 0758          Labor Length      1st Stage (hrs): 7 (min): 58   2nd Stage (hrs): 0 (min): 4   3rd Stage (hrs): 0 (min): 6          Labor Events     labor?: No   steroids: None  Labor Type: AROM  Predominate monitoring during 1st stage: continuous electronic fetal monitoring, telemetry     Antibiotics received  "during labor?: No       Rupture date/time: 23   Rupture type: Spontaneous Rupture of Membranes  Fluid color: Clear  Fluid odor: Normal     Augmentation: Oxytocin  Indications for augmentation: Ineffective Contraction Pattern       Delivery/Placenta Date and Time      Delivery Date: 12/3/23 Delivery Time:  7:59 AM   Placenta Date/Time: 12/3/2023  8:06 AM  Delivering clinician: Jose J Carvajal MD   Other personnel present at delivery:  Provider Role   Say Williamson, RN Delivery Nurse   Shannan Borrero RN Delivery Nurse             Vaginal Counts       Initial count performed by 2 team members:  Two Team Members   Wei Williamson         Needles Suture Needles Sponges (RETIRED) Instruments   Initial counts 0 0 5    Added to count       Relief counts       Final counts 0 0 5            Placed during labor Accounted for at the end of labor   FSE No NA   IUPC No NA   Cervidil No NA                  Final count performed by 2 team members:  Two Team Members   Wei Williamson      Final count correct?: Yes      Pre-Birth Team Brief: Complete  Post-Birth Team Debrief: Complete       Apgars    Living status: Living   1 Minute 5 Minute 10 Minute 15 Minute 20 Minute   Skin color: 0  1       Heart rate: 2  2       Reflex irritability: 2  2       Muscle tone: 2  2       Respiratory effort: 2  2       Total: 8  9       Apgars assigned by: SAY WILLIAMSON RN       Cord      Vessels: 3 Vessels    Cord Complications: Nuchal   Nuchal Intervention: reduced         Nuchal cord description: loose nuchal cord         Cord Blood Disposition: Discard    Gases Sent?: No    Delayed cord clamping?: Yes    Cord Clamping Delay (seconds):  seconds                       Rea Resuscitation    Methods: None               Measurements      Weight: 5 lb 1.1 oz Length: 1' 7.5\"     Head circumference: 31 cm           Skin to Skin and Feeding Plan      Skin to skin initiation date/time: 1841    Skin to skin with: " Mother  Skin to skin end date/time:            Labor Events and Shoulder Dystocia    Fetal Tracing Prior to Delivery: Category 1  Shoulder dystocia present?: Neg                 Delivery (Maternal) (Provider to Complete) ([id number])    Episiotomy: None      Perineal lacerations: None    Repair suture: None  Genital tract inspection done: Pos       Blood Loss  Mother: Artie Rapp #[id number]     Start of Mother's Information      Delivery Blood Loss  12/02/23 2357 - 12/03/23 2121      Delivery QBL (mL) Hospital Encounter 50 mL    Postpartum QBL (mL) Hospital Encounter 28 mL    Total  78 mL               End of Mother's Information  Mother: Artie Rapp #[id number]                Delivery - Provider to Complete ([id number])    Delivering clinician: Jose J Carvajal MD  Delivery Type (Choose the 1 that will go to the Birth History): Vaginal, Spontaneous                         Other personnel:  Provider Role   Tiffanie Williamson, RN Delivery Nurse   Shannan Borrero RN Delivery Nurse                    Placenta    Date/Time: 12/3/2023  8:06 AM  Removal: Spontaneous  Comments: nuchal x 3  Disposition: Hospital disposal             Anesthesia    Method: None                    Presentation and Position    Presentation: Vertex    Position: Right Occiput Anterior                     Jose J Cavrajal MD

## 2023-12-04 NOTE — PLAN OF CARE
Patient is managing pain with Ibuprofen and Tylenol.   Postpartum assessment WNL.   Still needs to complete mood assessment and birth certificate.   Fundus is firm and @-2 U, light lochia, denies having any clots. Up independently in room, voiding good amounts.   Problem: Adult Inpatient Plan of Care  Goal: Optimal Comfort and Wellbeing  Outcome: Progressing  Goal: Readiness for Transition of Care  Outcome: Progressing     Problem: Postpartum (Vaginal Delivery)  Goal: Successful Parent Role Transition  Outcome: Progressing  Goal: Hemostasis  Outcome: Progressing  Goal: Absence of Infection Signs and Symptoms  Outcome: Progressing  Goal: Anesthesia/Sedation Recovery  Outcome: Progressing  Goal: Optimal Pain Control and Function  Outcome: Progressing  Goal: Effective Urinary Elimination  Outcome: Progressing   Goal Outcome Evaluation:

## 2023-12-04 NOTE — PLAN OF CARE
All discharge education completed including danger signs, when to call the doctor or call 911, and information/resources on postpartum mood disorders. Also reviewed discharge medication instructions. Discharge medications provided  to the patient. Patient verbalizes understanding and denies having further questions. Follow up planned at 6 week postpartum visit or sooner if symptoms arise.     Khalida Cleveland RN on 12/4/2023 at 12:46 PM

## 2023-12-04 NOTE — DISCHARGE INSTRUCTIONS
*Keep next OB appointment with  on Wednesday, 23 - bring baby to clinic for  check.        Warning Signs after Having a Baby    Keep this paper on your fridge or somewhere else where you can see it.    Call your provider if you have any of these symptoms up to 12 weeks after having your baby.    Thoughts of hurting yourself or your baby  Pain in your chest or trouble breathing  Severe headache not helped by pain medicine  Eyesight concerns (blurry vision, seeing spots or flashes of light, other changes to eyesight)  Fainting, shaking or other signs of a seizure    Call  if you feel that it is an emergency.     The symptoms below can happen to anyone after giving birth. They can be very serious. Call your provider if you have any of these warning signs.    My provider s phone number: _______________________    Losing too much blood (hemorrhage)    Call your provider if you soak through a pad in less than an hour or pass blood clots bigger than a golf ball. These may be signs that you are bleeding too much.    Blood clots in the legs or lungs    After you give birth, your body naturally clots its blood to help prevent blood loss. Sometimes this increased clotting can happen in other areas of the body, like the legs or lungs. This can block your blood flow and be very dangerous.     Call your provider if you:  Have a red, swollen spot on the back of your leg that is warm or painful when you touch it.   Are coughing up blood.     Infection    Call your provider if you have any of these symptoms:  Fever of 100.4 F (38 C) or higher.  Pain or redness around your stitches if you had an incision.   Any yellow, white, or green fluid coming from places where you had stitches or surgery.    Mood Problems (postpartum depression)    Many people feel sad or have mood changes after having a baby. But for some people, these mood swings are worse.     Call your provider right away if you feel so anxious or  nervous that you can't care for yourself or your baby.    Preeclampsia (high blood pressure)    Even if you didn't have high blood pressure when you were pregnant, you are at risk for the high blood pressure disease called preeclampsia. This risk can last up to 12 weeks after giving birth.     Call your provider if you have:   Pain on your right side under your rib cage  Sudden swelling in the hands and face    Remember: You know your body. If something doesn't feel right, get medical help.     For informational purposes only. Not to replace the advice of your health care provider. Copyright 2020 [employer]. All rights reserved. Clinically reviewed by Pearl Valdez, RNC-OB, MSN. [employer] [id number] - Rev 02/23.

## 2023-12-04 NOTE — PLAN OF CARE
Patient denies pain.   Postpartum assessment WNL.   Patient is hopeful for discharge to home tomorrow.  Still needs to complete mood assessment and birth certificate and ROP. Received IV MD parul states no need for hemoglobin check in morning as long as lochia is light.  Problem: Adult Inpatient Plan of Care  Goal: Absence of Hospital-Acquired Illness or Injury  Intervention: Identify and Manage Fall Risk  Recent Flowsheet Documentation  Taken 12/3/2023 2152 by Era Tomas RN  Safety Promotion/Fall Prevention: clutter free environment maintained  Intervention: Prevent Skin Injury  Recent Flowsheet Documentation  Taken 12/3/2023 2152 by Era Tomas RN  Body Position: position changed independently  Goal: Optimal Comfort and Wellbeing  12/4/2023 0023 by Era Tomas RN  Outcome: Progressing  12/3/2023 1931 by Era Tomas RN  Outcome: Progressing  Intervention: Provide Person-Centered Care  Recent Flowsheet Documentation  Taken 12/3/2023 2152 by Era Tomas RN  Trust Relationship/Rapport:   care explained   choices provided   emotional support provided   questions answered   questions encouraged   reassurance provided  Goal: Readiness for Transition of Care  12/4/2023 0023 by Era Tomas RN  Outcome: Progressing  12/3/2023 1931 by Era Tomas RN  Outcome: Progressing     Problem: Labor  Goal: Stable Fetal Wellbeing  Intervention: Promote and Monitor Fetal Wellbeing  Recent Flowsheet Documentation  Taken 12/3/2023 2152 by Era Tomas RN  Body Position: position changed independently     Problem: Postpartum (Vaginal Delivery)  Goal: Successful Parent Role Transition  12/4/2023 0023 by Era Tomas RN  Outcome: Progressing  12/3/2023 1931 by Era Tomas RN  Outcome: Progressing  Goal: Hemostasis  12/4/2023 0023 by Era Tomas RN  Outcome: Progressing  12/3/2023 1931 by Genoveva  Era GALARZA RN  Outcome: Progressing  Goal: Absence of Infection Signs and Symptoms  12/4/2023 0023 by Era Tomas RN  Outcome: Progressing  12/3/2023 1931 by Era Tomas RN  Outcome: Progressing  Goal: Anesthesia/Sedation Recovery  12/4/2023 0023 by Era Tomas RN  Outcome: Progressing  12/3/2023 1931 by Era Tomas RN  Outcome: Progressing  Intervention: Optimize Anesthesia Recovery  Recent Flowsheet Documentation  Taken 12/3/2023 2152 by Era Tomas RN  Safety Promotion/Fall Prevention: clutter free environment maintained  Goal: Optimal Pain Control and Function  12/4/2023 0023 by Era Tomas RN  Outcome: Progressing  12/3/2023 1931 by Era Tomas RN  Outcome: Progressing  Goal: Effective Urinary Elimination  12/4/2023 0023 by Era Tomas RN  Outcome: Progressing  12/3/2023 1931 by Era Tomas RN  Outcome: Progressing   Goal Outcome Evaluation:

## 2023-12-06 ENCOUNTER — OFFICE VISIT (OUTPATIENT)
Dept: FAMILY MEDICINE | Facility: CLINIC | Age: 28
End: 2023-12-06
Payer: COMMERCIAL

## 2023-12-06 ENCOUNTER — MEDICAL CORRESPONDENCE (OUTPATIENT)
Dept: HEALTH INFORMATION MANAGEMENT | Facility: CLINIC | Age: 28
End: 2023-12-06

## 2023-12-06 VITALS
TEMPERATURE: 97.6 F | HEART RATE: 102 BPM | WEIGHT: 117 LBS | RESPIRATION RATE: 16 BRPM | HEIGHT: 60 IN | SYSTOLIC BLOOD PRESSURE: 92 MMHG | OXYGEN SATURATION: 100 % | DIASTOLIC BLOOD PRESSURE: 58 MMHG | BODY MASS INDEX: 22.97 KG/M2

## 2023-12-06 DIAGNOSIS — N94.9 VAGINAL DISCOMFORT: Primary | ICD-10-CM

## 2023-12-06 PROCEDURE — 99213 OFFICE O/P EST LOW 20 MIN: CPT | Performed by: PHYSICIAN ASSISTANT

## 2023-12-06 NOTE — PROGRESS NOTES
Subjective:      Alvino Perkins is a 28 year old female with chief complaint of vaginal discomfort.  Delivered a baby on 11/1/2023.  About a month or so after delivery, she passed a large clot.  After that she was paying more attention to her pelvic area, and noticed that she has some tissue bulging out.  Since she first noticed this a month or so ago it has not changed.  Sometimes it might feel a little sore, no bleeding, no irritation no pain.  She does have a clear or sometimes yellow vaginal discharge.  Not using anything for birth control.  She declines offer of birth control.  Delivery went well.  No significant concerns.    Patient Active Problem List   Diagnosis    Acne Vulgaris    Twin, Mate Liveborn    Congenital Scoliosis    Nephrolithiasis    Asymptomatic microscopic hematuria    9 weeks gestation of pregnancy    Supervision of normal first pregnancy, antepartum    Encounter for triage in pregnant patient    Pregnant        Current Outpatient Medications:     docusate sodium (COLACE) 100 MG capsule, Take 1 capsule (100 mg) by mouth 2 times daily as needed for constipation, Disp: 60 capsule, Rfl: 0    ferrous gluconate (FERGON) 324 (38 Fe) MG tablet, Take 324 mg by mouth daily (with breakfast), Disp: , Rfl:     ferrous gluconate (FERGON) 324 (38 Fe) MG tablet, Take 1 tablet (324 mg) by mouth daily (with breakfast) (Patient not taking: Reported on 12/6/2023), Disp: 60 tablet, Rfl: 0    ibuprofen (ADVIL/MOTRIN) 800 MG tablet, Take 1 tablet (800 mg) by mouth every 8 hours as needed for other (cramping) (Patient not taking: Reported on 12/6/2023), Disp: 30 tablet, Rfl: 0    Prenatal MV-Min-Fe Fum-FA-DHA (PRENATAL MULTIVITAMIN PLUS DHA) 27-0.8-250 MG CAPS, Take 1 tablet by mouth daily OK to substitute formulary equivalent (Patient not taking: Reported on 12/6/2023), Disp: 100 capsule, Rfl: 3      Objective:     Allergies:  Patient has no known allergies.    BP 92/58 (BP Location: Left arm, Patient Position: Sitting,  Cuff Size: Adult Regular)   Pulse 102   Temp 97.6  F (36.4  C) (Temporal)   Resp 16   Ht 1.524 m (5')   Wt 53.1 kg (117 lb)   LMP 01/26/2023   SpO2 100%   BMI 22.85 kg/m    Body mass index is 22.85 kg/m .    General: Alert and oriented x 3, in no apparent distress  Genitourinary: External genitalia is normal in appearance, no unusual lesions or rashes in the genital area, I did not view anything abnormal during pelvic exam, patient then stood up and demonstrated the lump that she was concerned about.  This appears to be normal healthy pelvic tissue that is close to the vaginal opening        Assessment and Plan:     1. Vaginal discomfort  I reviewed with patient that exam today is grossly normal.  The tissue that she is concerned about appears to be normal healthy pelvic tissue.  No significant signs for organ prolapse.  No other lesions or other concerning findings.  I discussed with patient I think this is a normal finding, and should continue to resolve if we give this more time after her delivery.  I asked her to give this to approximately when her baby is 3 or 4 months old.  If she feels like this is improving, no need for follow-up.  If she feels like this has not improved at all, let us know and we could have her see OB/GYN.  If she notices problems before then, such as significant worsening pain, etc. she will let us know.  Reassurance provided today.      This dictation uses voice recognition software, which may contain typographical errors.

## 2023-12-07 ENCOUNTER — MEDICAL CORRESPONDENCE (OUTPATIENT)
Dept: HEALTH INFORMATION MANAGEMENT | Facility: CLINIC | Age: 28
End: 2023-12-07
Payer: COMMERCIAL

## 2023-12-07 ENCOUNTER — MYC MEDICAL ADVICE (OUTPATIENT)
Dept: FAMILY MEDICINE | Facility: CLINIC | Age: 28
End: 2023-12-07
Payer: COMMERCIAL

## 2023-12-07 ENCOUNTER — TELEPHONE (OUTPATIENT)
Dept: NURSING | Facility: CLINIC | Age: 28
End: 2023-12-07
Payer: COMMERCIAL

## 2023-12-07 NOTE — TELEPHONE ENCOUNTER
RN attempted to contact patient, but no answer. Left message on patient's voice mail to call clinic back.    Sarah Vora RN  [facility]    Artie HARTLEY [facility]/Ob Support Pool (supporting Jose J Carvajal MD)     Hi Dr Carvajal,     I notice that the area where the IV was place while I was in the hospital is red, swollen, and tender. It hurt when I touch it. I don t know if I should get it check out or will it go away.

## 2023-12-08 NOTE — TELEPHONE ENCOUNTER
Pt reports her hand is tender, red, and a little swollen where she had her IV in yesterday.     Denies any pus or drainage at the site. States the area is not warm and looks like a bruise. Nursing judgment used to help answer questions. Advised pt to monitor the redness and for any changes such as the site becoming warm/hot to the touch or for any drainage. Advised rotating between warm and cold packs at the site to help with discomfort.           Miracle Rivers RN on 12/7/2023 at 7:08 PM

## 2023-12-20 ENCOUNTER — MEDICAL CORRESPONDENCE (OUTPATIENT)
Dept: HEALTH INFORMATION MANAGEMENT | Facility: CLINIC | Age: 28
End: 2023-12-20

## 2024-01-04 ENCOUNTER — MEDICAL CORRESPONDENCE (OUTPATIENT)
Dept: HEALTH INFORMATION MANAGEMENT | Facility: CLINIC | Age: 29
End: 2024-01-04
Payer: COMMERCIAL

## 2024-03-01 ENCOUNTER — MYC REFILL (OUTPATIENT)
Dept: FAMILY MEDICINE | Facility: CLINIC | Age: 29
End: 2024-03-01
Payer: COMMERCIAL

## 2024-03-01 DIAGNOSIS — Z30.016 ENCOUNTER FOR INITIAL PRESCRIPTION OF TRANSDERMAL PATCH HORMONAL CONTRACEPTIVE DEVICE: ICD-10-CM

## 2024-03-04 RX ORDER — NORELGESTROMIN AND ETHINYL ESTRADIOL 35; 150 UG/MG; UG/MG
PATCH TRANSDERMAL
Qty: 9 PATCH | Refills: 3 | Status: SHIPPED | OUTPATIENT
Start: 2024-03-04

## 2024-06-03 ENCOUNTER — MEDICAL CORRESPONDENCE (OUTPATIENT)
Dept: HEALTH INFORMATION MANAGEMENT | Facility: CLINIC | Age: 29
End: 2024-06-03

## 2024-06-11 ENCOUNTER — MEDICAL CORRESPONDENCE (OUTPATIENT)
Dept: HEALTH INFORMATION MANAGEMENT | Facility: CLINIC | Age: 29
End: 2024-06-11
Payer: COMMERCIAL

## 2024-06-30 ENCOUNTER — HEALTH MAINTENANCE LETTER (OUTPATIENT)
Age: 29
End: 2024-06-30

## 2024-10-01 LAB
ABO/RH(D): NORMAL
ANTIBODY SCREEN: NEGATIVE
SPECIMEN EXPIRATION DATE: NORMAL

## 2024-10-02 ENCOUNTER — PRENATAL OFFICE VISIT (OUTPATIENT)
Dept: FAMILY MEDICINE | Facility: CLINIC | Age: 29
End: 2024-10-02
Attending: FAMILY MEDICINE
Payer: COMMERCIAL

## 2024-10-02 VITALS
DIASTOLIC BLOOD PRESSURE: 66 MMHG | TEMPERATURE: 98.5 F | SYSTOLIC BLOOD PRESSURE: 103 MMHG | OXYGEN SATURATION: 98 % | WEIGHT: 123 LBS | BODY MASS INDEX: 25.82 KG/M2 | HEIGHT: 58 IN | HEART RATE: 86 BPM

## 2024-10-02 DIAGNOSIS — Z34.90 SUPERVISION OF NORMAL PREGNANCY: Primary | ICD-10-CM

## 2024-10-02 DIAGNOSIS — D50.8 IRON DEFICIENCY ANEMIA SECONDARY TO INADEQUATE DIETARY IRON INTAKE: ICD-10-CM

## 2024-10-02 LAB
ERYTHROCYTE [DISTWIDTH] IN BLOOD BY AUTOMATED COUNT: 14.3 % (ref 10–15)
EST. AVERAGE GLUCOSE BLD GHB EST-MCNC: 100 MG/DL
HBA1C MFR BLD: 5.1 % (ref 0–5.6)
HCG UR QL: POSITIVE
HCT VFR BLD AUTO: 35.8 % (ref 35–47)
HGB BLD-MCNC: 11.6 G/DL (ref 11.7–15.7)
MCH RBC QN AUTO: 29.1 PG (ref 26.5–33)
MCHC RBC AUTO-ENTMCNC: 32.4 G/DL (ref 31.5–36.5)
MCV RBC AUTO: 90 FL (ref 78–100)
PLATELET # BLD AUTO: 291 10E3/UL (ref 150–450)
RBC # BLD AUTO: 3.99 10E6/UL (ref 3.8–5.2)
WBC # BLD AUTO: 7.1 10E3/UL (ref 4–11)

## 2024-10-02 PROCEDURE — 99000 SPECIMEN HANDLING OFFICE-LAB: CPT

## 2024-10-02 PROCEDURE — 99207 PR NO CHARGE NURSE ONLY: CPT

## 2024-10-02 PROCEDURE — 81025 URINE PREGNANCY TEST: CPT

## 2024-10-02 PROCEDURE — 87389 HIV-1 AG W/HIV-1&-2 AB AG IA: CPT

## 2024-10-02 PROCEDURE — 86900 BLOOD TYPING SEROLOGIC ABO: CPT

## 2024-10-02 PROCEDURE — 86803 HEPATITIS C AB TEST: CPT

## 2024-10-02 PROCEDURE — 87591 N.GONORRHOEAE DNA AMP PROB: CPT

## 2024-10-02 PROCEDURE — 86762 RUBELLA ANTIBODY: CPT

## 2024-10-02 PROCEDURE — 90656 IIV3 VACC NO PRSV 0.5 ML IM: CPT

## 2024-10-02 PROCEDURE — 86901 BLOOD TYPING SEROLOGIC RH(D): CPT

## 2024-10-02 PROCEDURE — 83036 HEMOGLOBIN GLYCOSYLATED A1C: CPT

## 2024-10-02 PROCEDURE — 87491 CHLMYD TRACH DNA AMP PROBE: CPT

## 2024-10-02 PROCEDURE — 87340 HEPATITIS B SURFACE AG IA: CPT

## 2024-10-02 PROCEDURE — 86780 TREPONEMA PALLIDUM: CPT

## 2024-10-02 PROCEDURE — 87086 URINE CULTURE/COLONY COUNT: CPT

## 2024-10-02 PROCEDURE — 36415 COLL VENOUS BLD VENIPUNCTURE: CPT

## 2024-10-02 PROCEDURE — 83655 ASSAY OF LEAD: CPT | Mod: 90

## 2024-10-02 PROCEDURE — 90471 IMMUNIZATION ADMIN: CPT

## 2024-10-02 PROCEDURE — 85027 COMPLETE CBC AUTOMATED: CPT

## 2024-10-02 PROCEDURE — 86850 RBC ANTIBODY SCREEN: CPT

## 2024-10-02 RX ORDER — PRENATAL VIT/IRON FUM/FOLIC AC 27MG-0.8MG
1 TABLET ORAL DAILY
Qty: 90 TABLET | Refills: 3 | Status: SHIPPED | OUTPATIENT
Start: 2024-10-02

## 2024-10-02 NOTE — PATIENT INSTRUCTIONS
Learning About Pregnancy  Your Care Instructions     Your health in the early weeks of your pregnancy is particularly important for your baby's health. Take good care of yourself. Anything you do that harms your body can also harm your baby.  Make sure to go to all of your doctor appointments. Regular checkups will help keep you and your baby healthy.  How can you care for yourself at home?  Diet    Choose healthy foods like fruits, vegetables, whole grains, lean proteins, and healthy fats.     Choose foods that are good sources of calcium, iron, and folate. You can try dairy products, dark leafy greens, fortified orange juice and cereals, almonds, broccoli, dried fruit, and beans.     Do not skip meals or go for many hours without eating. If you are nauseated, try to eat a small, healthy snack every 2 to 3 hours.     Avoid fish that are high in mercury. These include shark, swordfish, deng mackerel, marlin, orange roughy, and bigeye tuna, as well as tilefish from the [facility].     It's okay to eat up to 8 to 12 ounces a week of fish that are low in mercury or up to 4 ounces a week of fish that have medium levels of mercury. Some fish that are low in mercury are salmon, shrimp, canned light tuna, cod, and tilapia. Some fish that have medium levels of mercury are halibut and white albacore tuna.     Drink plenty of fluids. If you have kidney, heart, or liver disease and have to limit fluids, talk with your doctor before you increase the amount of fluids you drink.     Limit caffeine to about 200 to 300 mg per day. On average, a cup of brewed coffee has around 80 to 100 mg of caffeine.     Do not drink alcohol, such as beer, wine, or hard liquor.     Take a multivitamin that contains at least 400 micrograms (mcg) of folic acid to help prevent birth defects. Fortified cereal and whole wheat bread are good additional sources of folic acid.     Increase the calcium in your diet. Try to drink a quart of skim milk  each day. You may also take calcium supplements and choose foods such as cheese and yogurt.   Lifestyle    Make sure you go to your follow-up appointments.     Get plenty of rest. You may be unusually tired while you are pregnant.     Get at least 30 minutes of exercise on most days of the week. Walking is a good choice. If you have not exercised in the past, start out slowly. Take several short walks each day.     Do not smoke. If you need help quitting, talk to your doctor about stop-smoking programs. These can increase your chances of quitting for good.     Do not touch cat feces or litter boxes. Also, wash your hands after you handle raw meat, and fully cook all meat before you eat it. Wear gloves when you work in the yard or garden, and wash your hands well when you are done. Cat feces, raw or undercooked meat, and contaminated dirt can cause an infection that may harm your baby or lead to a miscarriage.     Avoid things that can make your body too hot and may be harmful to your baby, such as a hot tub or sauna. Or talk with your doctor before doing anything that raises your body temperature. Your doctor can tell you if it's safe.     Avoid chemical fumes, paint fumes, or poisons.     Do not use illegal drugs, marijuana, or alcohol.   Medicines    Review all of your medicines with your doctor. Some of your routine medicines may need to be changed to protect your baby.     Use acetaminophen (Tylenol) to relieve minor problems, such as a mild headache or backache or a mild fever with cold symptoms. Do not use nonsteroidal anti-inflammatory drugs (NSAIDs), such as ibuprofen (Advil, Motrin) or naproxen (Aleve), unless your doctor says it is okay.     Do not take two or more pain medicines at the same time unless the doctor told you to. Many pain medicines have acetaminophen, which is Tylenol. Too much acetaminophen (Tylenol) can be harmful.     Take your medicines exactly as prescribed. Call your doctor if you  "think you are having a problem with your medicine.   To manage morning sickness    Keep food in your stomach, but not too much at once. Try eating five or six small meals a day instead of three large meals.     For nausea when you wake up, eat a small snack, such as a couple of crackers or pretzels, before rising. Allow a few minutes for your stomach to settle before you slowly get up.     Try to avoid smells and foods that make you feel nauseated. High-fat or greasy foods, milk, and coffee may make nausea worse. Some foods that may be easier to tolerate include cold, spicy, sour, and salty foods.     Drink enough fluids. Water and other caffeine-free drinks are good choices.     Take your prenatal vitamins at night on a full stomach.     Try foods and drinks made with kinsey. Kinsey may help with nausea.     Get lots of rest. Morning sickness may be worse when you are tired.     Talk to your doctor about over-the-counter products, such as vitamin B6 or doxylamine, to help relieve symptoms.     Try a P6 acupressure wrist band. These anti-nausea wristbands help some people.   Follow-up care is a key part of your treatment and safety. Be sure to make and go to all appointments, and call your doctor if you are having problems. It's also a good idea to know your test results and keep a list of the medicines you take.  Where can you learn more?  Go to https://www.Novogen.net/patiented  Enter E868 in the search box to learn more about \"Learning About Pregnancy.\"  Current as of: July 10, 2023  Content Version: 14.2 2024 [facility].   Care instructions adapted under license by your healthcare professional. If you have questions about a medical condition or this instruction, always ask your healthcare professional. [employer]orporated disclaims any warranty or liability for your use of this information.    Weeks 6 to 10 of Your Pregnancy: Care Instructions  During these weeks of pregnancy, your body " "goes through many changes. You may start to feel different, both in your body and your emotions. Each pregnancy is different, so there's no \"right\" way to feel. These early weeks are a time to make healthy choices for you and your pregnancy.    Take a daily prenatal vitamin. Choose one with folic acid in it.   Avoid alcohol, tobacco, and drugs (including marijuana). If you need help quitting, talk to your doctor.     Drink plenty of liquids.  Be sure to drink enough water. And limit sodas, other sweetened drinks, and caffeine.     Choose foods that are good sources of calcium, iron, and folate.  You can try dairy products, dark leafy greens, fortified orange juice and cereals, almonds, broccoli, dried fruit, and beans.     Avoid foods that may be harmful.  Don't eat raw meat, deli meat, raw seafood, or raw eggs. Avoid soft cheese and unpasteurized dairy, like Brie and blue cheese. And don't eat fish that contains a lot of mercury, like shark and swordfish.     Don't touch paul litter or cat poop.  They can cause an infection that could be harmful during pregnancy.     Avoid things that can make your body too hot.  For example, avoid hot tubs and saunas.     Soothe morning sickness.  Try eating 5 or 6 small meals a day, getting some fresh air, or using derick to control symptoms.     Ask your doctor about flu and COVID-19 shots.  Getting them can help protect against infection.   Follow-up care is a key part of your treatment and safety. Be sure to make and go to all appointments, and call your doctor if you are having problems. It's also a good idea to know your test results and keep a list of the medicines you take.  Where can you learn more?  Go to https://www.Jovie.net/patiented  Enter G112 in the search box to learn more about \"Weeks 6 to 10 of Your Pregnancy: Care Instructions.\"  Current as of: July 10, 2023  Content Version: 14.2 2024 [facility] [employer].   Care instructions adapted under license " by your healthcare professional. If you have questions about a medical condition or this instruction, always ask your healthcare professional. [employer] disclaims any warranty or liability for your use of this information.       Pregnancy: Managing Morning Sickness (01:48)  Your health professional recommends that you watch this short online health video.  Learn how to manage morning sickness during pregnancy.   Purpose: Learn how to manage morning sickness during pregnancy.  Goal: Learn how to manage morning sickness during pregnancy.    Watch: Scan the QR code or visit the link to view video       [email or web address]  Current as of: July 10, 2023  Content Version: 14.2 2024 [employer].   Care instructions adapted under license by your healthcare professional. If you have questions about a medical condition or this instruction, always ask your healthcare professional. [employer] disclaims any warranty or liability for your use of this information.    Pregnancy and Heartburn: Care Instructions  Overview     Heartburn is a common problem during pregnancy.  Heartburn happens when stomach acid backs up into the tube that carries food to the stomach. This tube is called the esophagus. Early in pregnancy, heartburn is caused by hormone changes that slow down digestion. Later on, it's also caused by the large uterus pushing up on the stomach.  Even though you can't fix the cause, there are things you can do to get relief. Treating heartburn during pregnancy focuses first on making lifestyle changes, like changing what and how you eat, and on taking medicines.  Heartburn usually improves or goes away after childbirth.  Follow-up care is a key part of your treatment and safety. Be sure to make and go to all appointments, and call your doctor if you are having problems. It's also a good idea to know your test results and keep a list of the medicines you take.  How can  "you care for yourself at home?  Eat small, frequent meals.  Avoid foods that make your symptoms worse, such as chocolate, peppermint, and spicy foods. Avoid drinks with caffeine, such as coffee, tea, and sodas.  Avoid bending over or lying down after meals.  Take a short walk after you eat.  If heartburn is a problem at night, do not eat for 2 hours before bedtime.  Take antacids like Mylanta, Maalox, Rolaids, or Tums. Do not take antacids that have sodium bicarbonate, magnesium trisilicate, or aspirin. Be careful when you take over-the-counter antacid medicines. Many of these medicines have aspirin in them. While you are pregnant, do not take aspirin or medicines that contain aspirin unless your doctor says it is okay.  If you're not getting relief, talk to your doctor. You may be able to take a stronger acid-reducing medicine.  When should you call for help?   Call your doctor now or seek immediate medical care if:    You have new or worse belly pain.     You are vomiting.   Watch closely for changes in your health, and be sure to contact your doctor if:    You have new or worse symptoms of reflux.     You are losing weight.     You have trouble or pain swallowing.     You do not get better as expected.   Where can you learn more?  Go to https://www.[email or web address].net/patiented  Enter U946 in the search box to learn more about \"Pregnancy and Heartburn: Care Instructions.\"  Current as of: July 10, 2023  Content Version: 14.1 2006-2024 [employer] ([email or web address]).   Care instructions adapted under license by your healthcare professional. If you have questions about a medical condition or this instruction, always ask your healthcare professional. [email or web address] disclaims any warranty or liability for your use of this information.    Constipation: Care Instructions  Overview     Constipation means that you have a hard time passing stools (bowel movements). People pass stools from 3 times a day to once every 3 " days. What is normal for you may be different. Constipation may occur with pain in the rectum and cramping. The pain may get worse when you try to pass stools. Sometimes there are small amounts of bright red blood on toilet paper or the surface of stools. This is because of enlarged veins near the rectum (hemorrhoids).  A few changes in your diet and lifestyle may help you avoid ongoing constipation. Your doctor may also prescribe medicine to help loosen your stool.  Some medicines can cause constipation. These include pain medicines and antidepressants. Tell your doctor about all the medicines you take. Your doctor may want to make a medicine change to ease your symptoms.  Follow-up care is a key part of your treatment and safety. Be sure to make and go to all appointments, and call your doctor if you are having problems. It's also a good idea to know your test results and keep a list of the medicines you take.  How can you care for yourself at home?  Drink plenty of fluids. If you have kidney, heart, or liver disease and have to limit fluids, talk with your doctor before you increase the amount of fluids you drink.  Include high-fiber foods in your diet each day. These include fruits, vegetables, beans, and whole grains.  Get at least 30 minutes of exercise on most days of the week. Walking is a good choice. You also may want to do other activities, such as running, swimming, cycling, or playing tennis or team sports.  Take a fiber supplement, such as Citrucel or Metamucil, every day. Read and follow all instructions on the label.  Schedule time each day for a bowel movement. A daily routine may help. Take your time having a bowel movement, but don't sit for more than 10 minutes at a time. And don't strain too much.  Support your feet with a small step stool when you sit on the toilet. This helps flex your hips and places your pelvis in a squatting position.  Your doctor may recommend an over-the-counter laxative  "to relieve your constipation. Examples are Milk of Magnesia and MiraLax. Read and follow all instructions on the label. Do not use laxatives on a long-term basis.  When should you call for help?   Call your doctor now or seek immediate medical care if:    You have new or worse belly pain.     You have new or worse nausea or vomiting.     You have blood in your stools.   Watch closely for changes in your health, and be sure to contact your doctor if:    Your constipation is getting worse.     You do not get better as expected.   Where can you learn more?  Go to https://www.[employer]ive Technologies.net/patiented  Enter P343 in the search box to learn more about \"Constipation: Care Instructions.\"  Current as of: October 19, 2023  Content Version: 14.2 2024 [employer].   Care instructions adapted under license by your healthcare professional. If you have questions about a medical condition or this instruction, always ask your healthcare professional. Healthwise, Incorporated disclaims any warranty or liability for your use of this information.    Learning About High-Iron Foods  What foods are high in iron?     The foods you eat contain nutrients, such as vitamins and minerals. Iron is a nutrient. Your body needs the right amount to stay healthy and work as it should. You can use the list below to help you make choices about which foods to eat.  Here are some foods that contain iron. They have 1 to 2 milligrams of iron per serving.  Fruits  Figs (dried), 5 figs  Vegetables  Asparagus (canned), 6 tuttle  Cristo, beet, Swiss chard, or turnip greens, 1 cup  Dried peas, cooked,   cup  Seaweed, spirulina (dried),   cup  Spinach, (cooked)   cup or (raw) 1 cup  Grains  Cereals, fortified with iron, 1 cup  Grits (instant, cooked), fortified with iron,   cup  Meats and other protein foods  Beans (kidney, lima, navy, white), canned or cooked,   cup  Beef or lamb, 3 oz  Chicken giblets, 3 oz  Chickpeas (garbanzo beans),   " "cup  Liver of beef, lamb, or pork, 3 oz  Oysters (cooked), 3 oz  Sardines (canned), 3 oz  Soybeans (boiled),   cup  Tofu (firm),   cup  Work with your doctor to find out how much of this nutrient you need. Depending on your health, you may need more or less of it in your diet.  Where can you learn more?  Go to https://www.[employer].net/patiented  Enter R005 in the search box to learn more about \"Learning About High-Iron Foods.\"  Current as of: September 20, 2023  Content Version: 14.2 2024 [employer].   Care instructions adapted under license by your healthcare professional. If you have questions about a medical condition or this instruction, always ask your healthcare professional. Healthwise, Incorporated disclaims any warranty or liability for your use of this information.    Rh Antibodies Screening During Pregnancy: About This Test  What is it?     The Rh antibodies screening test is a blood test. It checks your blood for Rh antibodies. If you have Rh-negative blood and have been exposed to Rh-positive blood, your immune system may make antibodies to attack the Rh-positive blood. When a pregnant woman has these antibodies, it is called Rh sensitization.  Why is this test done?  The Rh antibodies screening test is done during pregnancy to find out if your baby is at risk for Rh disease. This can happen if you have Rh-negative blood and your baby has Rh-positive blood. If your Rh-negative blood mixes with Rh-positive blood, your immune system will make antibodies to attack the Rh-positive blood.  During pregnancy, these antibodies could attach to the baby's red blood cells. This can cause your baby to have serious health problems. The results of this test will help your doctor know how to best care for you and your baby during your pregnancy.  How do you prepare for the test?  In general, there's nothing you have to do before this test, unless your doctor tells you to.  How is the test done?  A " "health professional uses a needle to take a blood sample, usually from the arm.  What happens after the test?  You will probably be able to go home right away. It depends on the reason for the test.  You can go back to your usual activities right away.  Follow-up care is a key part of your treatment and safety. Be sure to make and go to all appointments, and call your doctor if you are having problems. It's also a good idea to keep a list of the medicines you take. Ask your doctor when you can expect to have your test results.  Where can you learn more?  Go to https://www.Rocketboom.net/patiented  Enter P722 in the search box to learn more about \"Rh Antibodies Screening During Pregnancy: About This Test.\"  Current as of: July 10, 2023  Content Version: 142024 [employer].   Care instructions adapted under license by your healthcare professional. If you have questions about a medical condition or this instruction, always ask your healthcare professional. Healthwise, Incorporated disclaims any warranty or liability for your use of this information.    Learning About Preventing Rh Disease  What is Rh disease?     Rh disease can be a serious problem in pregnancy. It happens when substances called antibodies in the mother's blood cause red blood cells in her baby's blood to be destroyed. This can occur when the blood types of a mother and her baby do not match.  All blood has an Rh factor. This is what makes a blood type positive or negative. When you are Rh-negative, your baby may be Rh-negative or Rh-positive. If your baby has Rh-positive blood and it mixes with yours, your body will make antibodies. This is called Rh sensitization.  Most of the time, this is not a problem in a first pregnancy. But in future pregnancies, it could cause Rh disease.  A  with Rh disease has mild anemia and may have jaundice. In severe cases, anemia, jaundice, and swelling can be very dangerous or fatal. Some babies " "need to be delivered early. Some need special care in the NICU. A very sick baby will need a blood transfusion before or after birth.  Fortunately, Rh sensitization is usually easy to prevent.  That's why it's important to get your Rh status checked in your first trimester. It doesn't cause any warning signs. A blood test is the only way to know if you are Rh-sensitive or are at risk for it.  How can you prevent Rh disease?  If you are Rh-negative, your doctor gives you an Rh immune globulin shot (such as RhoGAM). It helps prevent your body from making the antibodies that attack your baby's red blood cells.  Timing is important. You need the shot at certain times during your pregnancy. And you need one anytime there is a chance that your baby's blood might mix with yours. That can happen with certain prenatal tests or when you have pregnancy bleeding, such as:  Right after any pregnancy loss, amniocentesis, or CVS testing.  After turning of a breech baby.  Before and maybe after childbirth. Your doctor gives you a shot around week 28. If your  is Rh-positive, you will have another shot.  Follow-up care is a key part of your treatment and safety. Be sure to make and go to all appointments, and call your doctor if you are having problems. It's also a good idea to know your test results and keep a list of the medicines you take.  Where can you learn more?  Go to https://www.Orbster.net/patiented  Enter W177 in the search box to learn more about \"Learning About Preventing Rh Disease.\"  Current as of: July 10, 2023  Content Version: 14. [employer].   Care instructions adapted under license by your healthcare professional. If you have questions about a medical condition or this instruction, always ask your healthcare professional. [employer] disclaims any warranty or liability for your use of this information.    Learning About Fetal Ultrasound Results  What is a fetal " ultrasound?     Fetal ultrasound is a test that lets your doctor see an image of your baby. Your doctor learns information about your baby from this picture. You may find out, for example, if you are having a boy or a girl. But the main reason you have this test is to get information about your baby's health.  (You may hear your baby called a fetus. This is a common medical term for a baby that's growing in the mother's uterus.)  What kind of information can you learn from this test?  The findings of an ultrasound fall into two categories, normal and abnormal.  Normal  The fetus is the right size for its age.  The placenta is the expected size and does not cover the cervix.  There is enough amniotic fluid in the uterus.  No birth defects can be seen.  Abnormal  The fetus is small or large for its age.  The placenta covers the cervix.  There is too much or too little amniotic fluid in the uterus.  The fetus may have a birth defect.  What does an abnormal result mean?  Abnormal seems to imply that something is wrong with your baby. But what it means is that the test has shown something the doctor wants to take a closer look at.  And that's what happens next. Your doctor will talk to you about what further test or tests you may need.  What do the results mean?  Some of the things your doctor may see on an abnormal ultrasound include:  Echogenic bowel.  The bowel looks very bright on the screen. This could mean that there's blood in the bowel. Or it could mean that something is blocking the small bowel.  Increased nuchal translucency.  The ultrasound measures the thickness at the back of the baby's neck. An increase in thickness is sometimes an early sign of Down syndrome.  Increased or decreased amniotic fluid.  The doctor will look for a reason for the level of amniotic fluid and will watch the pregnancy closely as it progresses.  Large ventricles.  Ventricles in the brain look larger than they should. Your doctor may  "take a closer look at the brain.  Renal pyelectasis/hydronephrosis.  The ultrasound measures the fluid around the kidney. If there is more fluid than expected, there is a chance of urinary tract or kidney problems.  Short long bones.  The ultrasound measures certain arm and leg bones. A long bone (humerus or femur) that is shorter than average could be a sign of Down syndrome.  Subchorionic hemorrhage.  An ultrasound can show bleeding under one of the membranes that surrounds the fetus. Some women don't have symptoms of bleeding. The ultrasound can find this problem when women are not bleeding from their vagina. Women who have this condition have a slightly higher chance of miscarriage.  What do you do now?  Take a deep breath, and let it out. Keep in mind that an abnormal finding on an ultrasound, after it's coupled with more information, may:  Turn out to be nothing.  Turn out to be something mild that won't affect the baby.  Turn out to be something more serious. But if this happens, early diagnosis helps you and your doctor plan treatment options sooner rather than later.  Your medical team is there for you. So are your family and friends. Ask questions, and get the help and support you need.  Follow-up care is a key part of your treatment and safety. Be sure to make and go to all appointments, and call your doctor if you are having problems. It's also a good idea to know your test results and keep a list of the medicines you take.  Where can you learn more?  Go to https://www.[employer].net/patiented  Enter K451 in the search box to learn more about \"Learning About Fetal Ultrasound Results.\"  Current as of: July 10, 2023  Content Version: 14.1    7110-3779 [employer].   Care instructions adapted under license by your healthcare professional. If you have questions about a medical condition or this instruction, always ask your healthcare professional. [employer] disclaims any warranty " or liability for your use of this information.    Learning About Prenatal Visits  Overview     Regular prenatal visits are very important during any pregnancy. These quick office visits may seem simple and routine. But they can help you have a safe and healthy pregnancy. Your doctor is watching for problems that can only be found through regular checkups. The visits also give you and your doctor time to build a good relationship.  After your first visit, you will most likely start on a schedule of monthly visits. In your third trimester, the visits will get more frequent. Based on your health, your age, and if you've had a normal, full-term pregnancy before, your doctor may want to see you more or less often.  At different times in your pregnancy, you will have exams and tests. Some are routine. Others are done only when there is a chance of a problem. Everything healthy you do for your body helps you have a healthy pregnancy. Rest when you need it. Eat well, drink plenty of water, and exercise regularly.  What happens during a prenatal visit?  You will have blood pressure checks, along with urine tests. You also may have blood tests. If you need to go to the bathroom while waiting for the doctor, tell the nurse. You will be given a sample cup so your urine can be tested.  You will be weighed and have your belly measured.  Your doctor may listen to the fetal heartbeat with a special device.  At about 24 weeks, and possibly earlier in your pregnancy, your doctor will check your blood sugar (glucose tolerance test) for diabetes that can occur during pregnancy. This is gestational diabetes, which can be harmful.  You will have tests to check for infections that could harm your . These include group B streptococcus and hepatitis B.  Your doctor may do ultrasounds to check for problems. This also checks the position of the fetus. An ultrasound uses sound waves to produce a picture of the fetus.  You may get your  "vaccines updated.  Your doctor may ask you questions to check for signs of anxiety or depression. Tell your doctor if you feel sad, anxious, or hopeless for more than a few days.  You may have other tests at any time during your pregnancy.  Use your visits to discuss with your doctor any concerns you have.  How can you care for yourself at home?  Get plenty of rest.  Try to exercise every day, if your doctor says it is okay. If you have not exercised in the past, start out slowly. For example, you can take short walks each day.  Choose healthy foods, such as fruits, vegetables, whole grains, lean proteins, low-fat dairy, and healthy fats.  Drink plenty of fluids. Cut down on drinks with caffeine, such as coffee, tea, and cola. If you have kidney, heart, or liver disease and have to limit fluids, talk with your doctor before you increase the amount of fluids you drink.  Try to avoid chemical fumes, paint fumes, and poisons.  If you smoke, vape, or use alcohol, marijuana, or other drugs, quit or cut back as much as you can. Talk to your doctor if you need help quitting.  Review all of your medicines, including over-the-counter medicines and supplements, with your doctor. Some of your routine medicines may need to be changed. Do not stop or start taking any medicines without talking to your doctor first.  Follow-up care is a key part of your treatment and safety. Be sure to make and go to all appointments, and call your doctor if you are having problems. It's also a good idea to know your test results and keep a list of the medicines you take.  Where can you learn more?  Go to [email or web address].[email or web address]/[email or web address]  Enter J502 in the search box to learn more about \"Learning About Prenatal Visits.\"  Current as of: July 10, 2023               Content Version: 14.0    1539-8158 [employer].   Care instructions adapted under license by your healthcare professional. If you have questions about a medical condition " or this instruction, always ask your healthcare professional. Healthwise, [facility] disclaims any warranty or liability for your use of this information.      Intimate Partner Violence: Care Instructions  Overview     If you want to save this information but don't think it is safe to take it home, see if a trusted friend can keep it for you. Plan ahead. Know who you can call for help, and memorize the phone number.   Be careful online too. Your online activity may be seen by others. Do not use your personal computer or device to read about this topic. Use a safe computer, such as one at work, a friend's home, or a library.    Intimate partner violence--a type of domestic abuse--is different from an argument now and then. It is a pattern of abuse that one person may use to control another person's behavior. It may start with threats and name-calling. Then, it may lead to more serious acts, like pushing and slapping. The abuse also may occur in other areas. For example, the abuser may withhold money or spend a partner's money without their knowledge.  Abuse can cause serious harm. You are more likely to have a long-term health problem from the injuries and stress of living in a violent relationship. People who are sexually abused by their partners have more sexually transmitted infections and unplanned pregnancies. Anyone who is abused also faces emotional pain. Anyone can be abused in relationships. In some relationships, both people use abusive behavior.  If you are pregnant, abuse can cause problems such as poor weight gain, infections, and bleeding. Abuse during this time may increase your baby's risk of low birth weight, premature birth, and death.  Follow-up care is a key part of your treatment and safety. Be sure to make and go to all appointments, and call your doctor if you are having problems. It's also a good idea to know your test results and keep a list of the medicines you take.  How can you care for  "yourself at home?  If you do not have a safe place to stay, discuss this with your doctor before you leave.  Have a plan for where to go, how to leave your home, and where to stay in case of an emergency. Do not tell your partner about your plan. Contact:  The National Domestic Violence Hotline toll-free at 1-390.432.2920. They can help you find resources in your area.  Your local police department, hospital, or clinic for information about shelters and safe homes near you.  Talk to a trusted friend or neighbor, a counselor, or a jose leader. Do not feel that you have to hide what happened.  Teach your children how to call for help in an emergency.  Be alert to warning signs, such as threats, heavy alcohol use, or drug use. This can help you avoid danger.  If you can, make sure that there are no guns or other weapons in your home.  When should you call for help?   Call 911 anytime you think you may need emergency care. For example, call if:    You or someone else has just been abused.     You think you or someone else is in danger of being abused.   Watch closely for changes in your health, and be sure to contact your doctor if you have any problems.  Where can you learn more?  Go to https://www.[employer].net/patiented  Enter G282 in the search box to learn more about \"Intimate Partner Violence: Care Instructions.\"  Current as of: June 24, 2023  Content Version: 14.2 2024 [facility] [employer].   Care instructions adapted under license by your healthcare professional. If you have questions about a medical condition or this instruction, always ask your healthcare professional. Healthwise, [employer] disclaims any warranty or liability for your use of this information.    Intimate Partner Violence Safety Instructions: Care Instructions  Overview     If you want to save this information but don't think it is safe to take it home, see if a trusted friend can keep it for you. Plan ahead. Know who you can call for " help, and memorize the phone number.   Be careful online too. Your online activity may be seen by others. Do not use your personal computer or device to read about this topic. Use a safe computer, such as one at work, a friend's home, or a library.    When you are abused by a spouse or partner, you can take actions to protect yourself and your children.  You can increase your safety whether you decide to stay with your spouse or partner or you decide to leave. You may want to make a safety plan and pack a bag ahead of time. This will help you leave quickly when you decide to. Remember, you cannot change your partner's actions, but you can find help for you and your children. No one deserves to be abused.  Follow-up care is a key part of your treatment and safety. Be sure to make and go to all appointments, and call your doctor if you are having problems. It's also a good idea to know your test results and keep a list of the medicines you take.  How can you care for yourself at home?  Make a plan for your safety   If you decide to stay with your abusive spouse or partner, you can do the following to increase your safety:  Decide what works best to keep you safe in an emergency.  Know who you can call to help you in an emergency.  Decide if you will call the police if you get hurt again. If you can, agree on a signal with your children or neighbor to call the police for you if you need help. You can flash lights or hang something out of a window.  Choose a safe place to go for a short time if you need to leave home. Memorize the address and phone number.  Learn escape routes out of your home in case you need to leave in a hurry. Teach your children different ways to get out of your home quickly if they need to.  If you can, hide or lock up things that can be used as weapons (guns, knives, hammers).  Learn the number of a domestic violence shelter. Talk to the people there about how they can help.  Find out about other  community resources that can help you.  Take pictures of bruises or other injuries if you can. You can also take pictures of things your abuser has broken.  Teach your children that violence is never okay. Tell them that they do not deserve to be hurt.  Pack a bag   Prepare a bag with things you will need if you leave suddenly. Leave it with a friend, a relative, or someone else you trust. You could include the following items in the bag:  A set of keys to your home and car.  Emergency phone numbers and addresses.  Money such as cash or checks. You can also ask a friend, a relative, or someone else you trust to hold money for you.  Copies of legal documents such as house and car titles or rent receipts, birth certificates, Social Security card, voter registration, marriage and 's licenses, and your children's health records.  Personal items you would need for a few days, such as clothes, a toothbrush, toothpaste, and any medicines you or your children need.  A favorite toy or book for your child or children.  Diapers and bottles, if you have very young children.  Pictures that show signs of abuse and violence. You may also add pictures of your abuser.  If you leave   If you decide to leave, you can take the following steps:  Go to the emergency room at a hospital if you have been hurt.  Think about asking the police to be with you as you leave. They can protect you as you leave your home.  If you decide to leave secretly, remember that activities can be tracked. Your abuser may still have access to your cell phone, email, and credit cards. It may be possible for these to be traced. Always be aware of your surroundings.  If this is an emergency, do not worry about gathering up anything. Just leave--your safety is most important.  If your abuser moves out, change the locks on the doors. If you have a security system, change the access code.  When should you call for help?   Call 911 anytime you think you may  "need emergency care. For example, call if:    You or someone else has just been abused.     You think you or someone else is in danger of being abused.   Watch closely for changes in your health, and be sure to contact your doctor if you have any problems.  Where can you learn more?  Go to https://www.[facility].net/patiented  Enter A752 in the search box to learn more about \"Intimate Partner Violence Safety Instructions: Care Instructions.\"  Current as of: June 24, 2023  Content Version: 14.2 2024 [employer].   Care instructions adapted under license by your healthcare professional. If you have questions about a medical condition or this instruction, always ask your healthcare professional. Healthwise, [employer] disclaims any warranty or liability for your use of this information.    Learning About Intimate Partner Violence  What is intimate partner violence?  Intimate partner violence is a type of domestic abuse. It's threatening, emotionally harmful, or violent behavior in a personal relationship. It can happen between past or current partners or spouses. In some relationships both people abuse each other. One partner may be more abusive. Or the abuse may be equal.  Abuse can affect people of any ethnic group, race, or Nondenominational. It can affect teens, adults, or the elderly. And it can happen to people of any sexual orientation, gender, or social status.  Abusers use fear, bullying, and threats to control their partners. They may control what their partners do. They may control where their partners go or who they see. They may act jealous, controlling, or possessive. These early signs of abuse may happen soon after the start of the relationship. Sometimes it can be hard to notice abuse at first. But after the relationship becomes more serious, the abuse may get worse.  If you are being abused in your relationship, it's important to get help. The abuse is not your fault. You don't have to face it " alone.  Be careful  It may not be safe to take home domestic abuse information like this handout. Some people ask a trusted friend to keep it for them. It's also important to plan ahead and to memorize the phone number of places you can go for help. If you are concerned about your safety, do not use your computer, smartphone, or tablet to read about domestic abuse.   What are the types of intimate partner violence?  Abuse can happen in different ways. Each type can happen on its own or in combination with others.  Emotional abuse  Emotional abuse is a pattern of threats, insults, or controlling behavior. It includes verbal abuse. It goes beyond healthy disagreements in a relationship. It's a sign of an unhealthy relationship.  Do you feel threatened, intimidated, or controlled?  Does your partner:  Threaten your children, other family members, or pets?  Use jokes meant to embarrass or shame you?  Call you names?  Tell you that you are a bad parent?  Threaten to take away your children?  Threaten to have you or your family members deported?  Control your access to money or other basic needs?  Control what you do, who you see or talk to, or where you go?  Another form of emotional abuse is denying that it is happening. Or the abuser may act like the abuse is no big deal or is your fault.  Sexual abuse  With sexual abuse, abusers may try to convince or force you to have sex. They may force you into sex acts you're not comfortable with. Or they may sexually assault you. Sexual abuse can happen even if you are in a committed relationship.  Physical abuse  Physical abuse means that a partner hits, kicks, or does something else to physically hurt you. Physical abuse that starts with a slap might lead to kicking, shoving, and choking over time. The abuser may also threaten to hurt or kill you.  Stalking  Stalking means that an abuser gives you attention that you do not want and that causes you fear. Examples of stalking  include:  Following you.  Showing up at places where the abuser isn't invited, such as at your work or school.  Constantly calling or texting you.  What problems can  to?  Intimate partner violence can be very dangerous. It can cause serious, repeated injury. It can even lead to death.  All forms of abuse can cause long-term health problems from the stress of a violent relationship. Verbal abuse can lead to sexual and physical abuse.  Abuse causes:  Emotional pain.  Depression.  Anxiety.  Post-traumatic stress.  Sexual abuse can lead to sexually transmitted infections (such as HIV/AIDS) and unplanned pregnancy.  Pregnancy can be a very dangerous time for people in abusive relationships. Abuse can cause or increase the risk of problems during pregnancy. These include low weight gain, anemia, infections, and bleeding. Abuse may also increase your baby's risk of low birth weight, premature birth, and death.  It can be hard for some victims of abuse to ask for help or to leave their relationship. You may feel scared, stuck, or not sure what steps to take. But it's important not to ignore abuse. Talking to someone you trust could be the first step to ending the abuse and taking care of your own health and happiness again. There are resources available that can help keep you safe.  Where can you get help?  Talk to a trusted friend. Find a local advocacy group, or talk to your doctor about the abuse.  Contact the National Domestic Violence Hotline at 5-007-036-USYW (1-966.299.1723) for more safety tips. They can guide you to groups in your area that can help. Or go to the National Coalition Against Domestic Violence website at www.thehotline.org to learn more.  Domestic violence groups or a counselor in your area can help you make a safety plan for yourself and your children.  When to call for help  Call 911 anytime you think you may need emergency care. For example, call if:  You think that you or someone you know  "is in danger of being abused.  You have been hurt and can't have someone safely take you to emergency care.  You have just been abused.  A family member has just been abused.  Where can you learn more?  Go to https://www.[employer].net/patiented  Enter S665 in the search box to learn more about \"Learning About Intimate Partner Violence.\"  Current as of: June 24, 2023  Content Version: 14.1 2006-2024 [employer].   Care instructions adapted under license by your healthcare professional. If you have questions about a medical condition or this instruction, always ask your healthcare professional. [employer] disclaims any warranty or liability for your use of this information.    Vaginal Bleeding During Pregnancy: Care Instructions  Overview     It's common to have some vaginal spotting when you are pregnant. In some cases, the bleeding isn't serious. And there aren't any more problems with the pregnancy.  But sometimes bleeding is a sign of a more serious problem. This is more common if the bleeding is heavy or painful. Examples of more serious problems include miscarriage, an ectopic pregnancy, and a problem with the placenta.  You may have to see your doctor again to be sure everything is okay. You may also need more tests to find the cause of the bleeding.  Home treatment may be all you need. But it depends on what is causing the bleeding. Be sure to tell your doctor if you have any new symptoms or if your symptoms get worse.  The doctor has checked you carefully, but problems can develop later. If you notice any problems or new symptoms, get medical treatment right away.  Follow-up care is a key part of your treatment and safety. Be sure to make and go to all appointments, and call your doctor if you are having problems. It's also a good idea to know your test results and keep a list of the medicines you take.  How can you care for yourself at home?  If your doctor prescribed " "medicines, take them exactly as directed. Call your doctor if you think you are having a problem with your medicine.  Do not have vaginal sex until your doctor says it's okay.  Do not put anything in your vagina until your doctor says it's okay.  Ask your doctor about other activities you can or can't do.  Get a lot of rest. Being pregnant can make you tired.  Do not use nonsteroidal anti-inflammatory drugs (NSAIDs), such as ibuprofen (Advil, Motrin), naproxen (Aleve), or aspirin, unless your doctor says it is okay.  When should you call for help?   Call 911 anytime you think you may need emergency care. For example, call if:    You passed out (lost consciousness).     You have severe vaginal bleeding. This means you are soaking through a pad each hour for 2 or more hours.     You have sudden, severe pain in your belly or pelvis.   Call your doctor now or seek immediate medical care if:    You have new or worse vaginal bleeding.     You are dizzy or lightheaded, or you feel like you may faint.     You have pain in your belly, pelvis, or lower back.     You think that you are in labor.     You have a sudden release of fluid from your vagina.     You've been having regular contractions for an hour. This means that you've had at least 8 contractions within 1 hour or at least 4 contractions within 20 minutes, even after you change your position and drink fluids.     You notice that your baby has stopped moving or is moving much less than normal.   Watch closely for changes in your health, and be sure to contact your doctor if you have any problems.  Where can you learn more?  Go to https://www.[employer].net/patiented  Enter N829 in the search box to learn more about \"Vaginal Bleeding During Pregnancy: Care Instructions.\"  Current as of: July 10, 2023               Content Version: 14.0    0018-1901 Healthwise, [employer].   Care instructions adapted under license by your healthcare professional. If you have questions " about a medical condition or this instruction, always ask your healthcare professional. Healthwise, [employer] disclaims any warranty or liability for your use of this information.      Weeks 10 to 14 of Your Pregnancy: Care Instructions  It's now possible to hear the fetus's heartbeat with a special ultrasound device. And the fetus's organs are developing.    Decide about tests to check for birth defects. Think about your age, your chance of passing on a family disease, your need to know about any problems, and what you might do after you have the test results.   It's okay to exercise. Try activities such as walking or swimming. Check with your doctor before starting a new program.     You may feel more tired than usual.  Taking naps during the day may help.     You may feel emotional.  It might help to talk to someone.     You may have headaches.  Try lying down and putting a cool cloth over your forehead.     You can use acetaminophen (Tylenol) for pain relief.  Don't take any anti-inflammatory medicines (such as Advil, Motrin, Aleve), unless your doctor says it's okay.     You may feel a fullness or aching in your lower belly.  This can feel like the kind of cramps you might get before a period. A back rub may help.     You may need to urinate more.  Your growing uterus and changing hormones can affect your bladder.     You may feel sick to your stomach (morning sickness).  Try avoiding food and smells that make you feel sick.     Your breasts may feel different.  They may feel tender or get bigger. Your nipples may get darker. Try a bra that gives you good support.     Avoid alcohol, tobacco, and drugs (including marijuana).  If you need help quitting, talk to your doctor.     Take a daily prenatal vitamin.  Choose one with folic acid.   Follow-up care is a key part of your treatment and safety. Be sure to make and go to all appointments, and call your doctor if you are having problems. It's also a good idea  "to know your test results and keep a list of the medicines you take.  Where can you learn more?  Go to https://www.MindEdge.net/patiented  Enter E090 in the search box to learn more about \"Weeks 10 to 14 of Your Pregnancy: Care Instructions.\"  Current as of: July 10, 2023  Content Version: 14.2 2024 [employer].   Care instructions adapted under license by your healthcare professional. If you have questions about a medical condition or this instruction, always ask your healthcare professional. Healthwise, [employer] disclaims any warranty or liability for your use of this information.      Nutrition During Pregnancy: Care Instructions  Overview     Healthy eating when you are pregnant is important for you and your baby. It can help you feel well and have a successful pregnancy and delivery. During pregnancy your nutrition needs increase. Even if you have excellent eating habits, your doctor may recommend a multivitamin to make sure you get enough iron and folic acid.  You may wonder how much weight you should gain. In general, if you were at a healthy weight before you became pregnant, then you should gain between 25 and 35 pounds. If you were overweight before pregnancy, then you'll likely be advised to gain 15 to 25 pounds. If you were underweight before pregnancy, then you'll probably be advised to gain 28 to 40 pounds. Your doctor will work with you to set a weight goal that is right for you. Gaining a healthy amount of weight helps you have a healthy baby.  Follow-up care is a key part of your treatment and safety. Be sure to make and go to all appointments, and call your doctor if you are having problems. It's also a good idea to know your test results and keep a list of the medicines you take.  How can you care for yourself at home?  Eat plenty of fruits and vegetables. Include a variety of orange, yellow, and leafy dark-green vegetables every day.  Choose whole-grain bread, cereal, and " pasta. Good choices include whole wheat bread, whole wheat pasta, brown rice, and oatmeal.  Get 4 or more servings of milk and milk products each day. Good choices include nonfat or low-fat milk, yogurt, and cheese. If you cannot eat milk products, you can get calcium from calcium-fortified products such as orange juice, soy milk, and tofu. Other non-milk sources of calcium include leafy green vegetables, such as broccoli, kale, mustard greens, turnip greens, bok hien, and brussels sprouts.  If you eat meat, pick lower-fat types. Good choices include lean cuts of meat and chicken or turkey without the skin.  Avoid fish that are high in mercury. These include shark, swordfish, deng mackerel, marlin, orange roughy, and bigeye tuna, as well as tilefish from the [facility].  It's okay to eat up to 8 to 12 ounces a week of fish that are low in mercury or up to 4 ounces a week of fish that have medium levels of mercury. Some fish that are low in mercury are salmon, shrimp, canned light tuna, cod, and tilapia. Some fish that have medium levels of mercury are halibut and white albacore tuna.  For more advice about eating fish, you can visit the U.S. Food and Drug Administration (FDA) or U.S. Environmental Protection Agency (EPA) website.  Heat lunch meats (such as turkey, ham, or bologna) to 165 F before you eat them. This reduces your risk of getting sick from a kind of bacteria that can be found in lunch meats.  Do not eat unpasteurized soft cheeses, such as brie, feta, fresh mozzarella, and blue cheese. They have a bacteria that could harm your baby.  Limit caffeine to about 200 to 300 mg per day. On average, a cup of brewed coffee has around 80 to 100 mg of caffeine.  Do not drink any alcohol. No amount of alcohol has been found to be safe during pregnancy.  Do not diet or try to lose weight. For example, do not follow a low-carbohydrate diet. If you are overweight at the start of your pregnancy, your doctor will  "work with you to manage your weight gain.  Tell your doctor about all vitamins and supplements you take.  When should you call for help?  Watch closely for changes in your health, and be sure to contact your doctor if you have any problems.  Where can you learn more?  Go to https://www.VertiFlex.net/patiented  Enter Y785 in the search box to learn more about \"Nutrition During Pregnancy: Care Instructions.\"  Current as of: September 20, 2023  Content Version: 14.2 2024 [employer].   Care instructions adapted under license by your healthcare professional. If you have questions about a medical condition or this instruction, always ask your healthcare professional. Healthwise, [employer] disclaims any warranty or liability for your use of this information.    Exercise During Pregnancy: Care Instructions  Overview     Exercise is good for you during a healthy pregnancy. It can relieve back pain, swelling, and other discomforts. It also prepares your muscles for childbirth. And exercise can improve your energy level and help you sleep better.  If your doctor advises it, get more exercise. For example, walking is a good choice. Bit by bit, increase the amount you walk every day. Try for at least 30 minutes on most days of the week. You could also try a prenatal exercise class. But if you do not already exercise, be sure to talk with your doctor before you start a new exercise program. Doctors do not recommend contact sports during pregnancy.  Follow-up care is a key part of your treatment and safety. Be sure to make and go to all appointments, and call your doctor if you are having problems. It's also a good idea to know your test results and keep a list of the medicines you take.  How can you care for yourself at home?  Talk with your doctor about the right kind of exercise for each stage of pregnancy.  Listen to your body to know if your exercise is at a safe level.  Do not become overheated while you " "exercise. High body temperature can be harmful. Avoid activities that can make your body too hot.  If you feel tired, take it easy. You might walk instead of run.  If you are used to strenuous exercise, ask your doctor how to know when it's time to slow down.  If you exercised before getting pregnant, you should be able to keep up your routine early in your pregnancy. Later in your pregnancy, you may want to switch to more gentle activities.  Drink plenty of fluids before, during, and after exercise.  Avoid contact sports, such as soccer and basketball. Also avoid risky activities. These include scuba diving, horseback riding, and exercising at a high altitude (above 6,000 feet). If you live in a place with a high altitude, talk to your doctor about how you can exercise safely.  Do not get overtired while you exercise. You should be able to talk while you work out.  After your fourth month of pregnancy, avoid exercises that require you to lie flat on your back on a hard surface. These include sit-ups and some yoga poses.  Get plenty of rest. You may be very tired while you are pregnant.  Where can you learn more?  Go to https://www.Jusp.net/patiented  Enter S801 in the search box to learn more about \"Exercise During Pregnancy: Care Instructions.\"  Current as of: July 10, 2023               Content Version: 14.0    [id number] [employer].   Care instructions adapted under license by your healthcare professional. If you have questions about a medical condition or this instruction, always ask your healthcare professional. [employer] disclaims any warranty or liability for your use of this information.      You have been provided the CDC Warning Signs in Pregnancy document.    Additional copies can be found here: www.Sportmaniacs.com/095903.pdf  "

## 2024-10-02 NOTE — PROGRESS NOTES
SUBJECTIVE:     HPI:    This is a 29 year old female patient,  who presents for her first obstetrical visit.    ANNALISA: 2025, Alternate ANNALISA Entry.  She is 11w6d weeks.  Her cycles are regular.  Her last menstrual period was normal.   Since her LMP, she has experienced  nausea and fatigue).   She denies emesis, abdominal pain, headache, loss of appetite, vaginal discharge, dysuria, pelvic pain, urinary urgency, lightheadedness, urinary frequency, vaginal bleeding, hemorrhoids, and constipation.    Additional History: Iron deficiency anemia related to inadequate dietary iron intake.    Have you travelled during the pregnancy?No  Have your sexual partner(s) travelled during the pregnancy?No    HISTORY:   Planned Pregnancy: No  Marital Status: Single  Occupation: Medical Records  Living in Household: Spouse and Children    Past History:  Her past medical history   Past Medical History:   Diagnosis Date    Anemia     borderline    Delayed delivery after SROM (spontaneous rupture of membranes) 2022    Normal vaginal delivery 2019    Delivered on the toilet.  Coupling noted.  arom of clear fluid.  No meds.    Received cytotec and pitocin for bleeding.         Urinary tract infection     Vaginal delivery 2019    Delivered on the toilet.  Coupling noted.  arom of clear fluid.  No meds.  Received cytotec and pitocin for bleeding.      Vaginal delivery 2018    Unmedicated.  srom of clear fluid.  Delivered quickly after this.  Left labial laceration repaired.     .      She has a history of   two previous miscarriages    Since her last LMP she denies use of alcohol, tobacco and street drugs.    Past medical, surgical, social and family history were reviewed and updated in [facility].        Current Outpatient Medications   Medication Sig Dispense Refill    Prenatal Vit-Fe Fumarate-FA (PRENATAL MULTIVITAMIN W/IRON) 27-0.8 MG tablet Take 1 tablet by mouth daily. 90 tablet 3    ferrous gluconate  "(FERGON) 324 (38 Fe) MG tablet Take 1 tablet (324 mg) by mouth every other day 100 tablet 0    ibuprofen (ADVIL/MOTRIN) 600 MG tablet Take 1 tablet (600 mg) by mouth every 8 hours as needed for moderate pain 50 tablet 1    norelgestromin-ethinyl estradiol (ORTHO EVRA) 150-35 MCG/24HR patch Remove old patch and apply new patch onto the skin once a week for 3 weeks (21 days). Do not wear patch week 4 (days 22-28), then repeat. 9 patch 3    Prenatal MV-Min-Fe Fum-FA-DHA (PRENATAL MULTIVITAMIN PLUS DHA) 27-0.8-250 MG CAPS Take 1 tablet by mouth daily OK to substitute formulary equivalent 100 capsule 3    senna-docusate (SENOKOT-S/PERICOLACE) 8.6-50 MG tablet Take 1 tablet by mouth daily as needed for constipation 50 tablet 1     No current facility-administered medications for this visit.       ROS:   12 point review of systems negative other than symptoms noted below or in the HPI.      OBJECTIVE:     EXAM:  /66 (BP Location: Left arm, Patient Position: Sitting, Cuff Size: Adult Regular)   Pulse 86   Temp 98.5  F (36.9  C)   Ht 1.473 m (4' 10\")   Wt 55.8 kg (123 lb)   LMP 2024 (Exact Date)   SpO2 98%   Breastfeeding Yes   BMI 25.71 kg/m   Body mass index is 25.71 kg/m .      ASSESSMENT/PLAN:       ICD-10-CM    1. Supervision of normal pregnancy  Z34.90 HCG qualitative urine     ABO/Rh type and screen     Hepatitis B surface antigen     CBC with platelets     HIV Antigen Antibody Combo     Rubella Antibody IgG     Treponema Abs w Reflex to RPR and Titer     Urine Culture Aerobic Bacterial     Hepatitis C antibody     Hemoglobin A1c     Lead Venous Blood Confirm     Chlamydia & Gonorrhea by PCR, [facility] - Clinic Collect     Prenatal Vit-Fe Fumarate-FA (PRENATAL MULTIVITAMIN W/IRON) 27-0.8 MG tablet     US OB < 14 Weeks Single Transabdominal      2. Iron deficiency anemia secondary to inadequate dietary iron intake  D50.8           29 year old , 11w6d weeks of pregnancy with ANNALISA of 2025, " Alternate ANNALISA Entry    Discussed as follows:       New Prenatal Education  during nurse intake    Medications- Prenatal Vitamin, recommend one with DHA as this helps with development of eyes and brain, no specific brand recommendation   Water Intake-  ounces daily   Weight- monitored at each appointment   Common Symptoms- may experience shortness of breath, increased heart rate, nausea/vomiting, headaches, cramping, spotting, etc.  If symptoms not improved and or worsening, contact provider.        - Only take Tylenol (acetaminophen) for headaches/pain concerns   - Review remedies & OTC medication to help with common symptoms in pregnancy (see taking medication during pregnancy handout)     [place]- baby is protected, not uncommon to have light cramping or spotting, reach out if persisting or worsening    Diet   - Wash fruits and vegetables before eating raw or cooking   - Avoid unpasteurized products   - Avoid undercooked meat, poultry, fish (no raw fish) and eggs    - Reheat hot dogs and luncheon meats/cold cuts prior to consumption     Caffeine- limit to 200 mg/day (about 1.5 cups of coffee)   Abstain from smoking, alcohol, and drugs      Travel     - No travel 4-6 weeks out from due date   - Planes/lengthy drives- get up and walk every 1-2 hours for circulation, increased risk for DVT during pregnancy   - Seat belts- wear underneath belly not across it   - Air bags- back up seat      Disease and Infection     Risk of toxoplasmosis with cats  feces, have someone else change litter box during pregnancy, wear gloves when working outside and wash hands thoroughly   Avoid traveling to locations high risk for zika, use insect repellent, wear long sleeves and pants   Flu vaccine during flu season, COVID vaccine and TDAP   TDAP recommended with each pregnancy, make sure partner is up to date as well (usually only once every 10 years)      Frequency of Appointments- unless advised otherwise   Every 4 weeks  until 28 weeks   Every 2 weeks until 36 weeks   Weekly until delivery      Counseling given:   - Follow up in 4-6 weeks for return OB visit.   First OB appt. Scheduled for 10/15/24 with Dr. Carvajal    Prenatal OB Questionnaire  Patient supplied answers from flow sheet for:  Prenatal OB Questionnaire.  Past Medical History  Have you ever recieved care for your mental health? : No  Have you ever been in a major accident or suffered serious trauma?: No  Within the last year, has anyone hit, slapped, kicked or otherwise hurt you?: No  In the last year, has anyone forced you to have sex when you didn't want to?: No    Past Medical History 2   Have you ever received a blood transfusion?: No  Would you accept a blood transfusion if was medically recommended?: Yes  Does anyone in your home smoke?: No   Is your blood type Rh negative?: Unknown  Have you ever ?: (!) Yes  Have you been hospitalized for a nonsurgical reason excluding normal delivery?: No  Have you ever had an abnormal pap smear?: No    Past Medical History (Continued)  Do you have a history of abnormalities of the uterus?: No  Did your mother take ILIANA or any other hormones when she was pregnant with you?: No  Do you have any other problems we have not asked about which you feel may be important to this pregnancy?: No      Allergies as of 10/2/2024:    Allergies as of 10/02/2024    (No Known Allergies)       Current medications are:  Current Outpatient Medications   Medication Sig Dispense Refill    Prenatal Vit-Fe Fumarate-FA (PRENATAL MULTIVITAMIN W/IRON) 27-0.8 MG tablet Take 1 tablet by mouth daily. 90 tablet 3    ferrous gluconate (FERGON) 324 (38 Fe) MG tablet Take 1 tablet (324 mg) by mouth every other day 100 tablet 0    ibuprofen (ADVIL/MOTRIN) 600 MG tablet Take 1 tablet (600 mg) by mouth every 8 hours as needed for moderate pain 50 tablet 1    norelgestromin-ethinyl estradiol (ORTHO EVRA) 150-35 MCG/24HR patch Remove old patch and apply new  patch onto the skin once a week for 3 weeks (21 days). Do not wear patch week 4 (days 22-28), then repeat. 9 patch 3    Prenatal MV-Min-Fe Fum-FA-DHA (PRENATAL MULTIVITAMIN PLUS DHA) 27-0.8-250 MG CAPS Take 1 tablet by mouth daily OK to substitute formulary equivalent 100 capsule 3    senna-docusate (SENOKOT-S/PERICOLACE) 8.6-50 MG tablet Take 1 tablet by mouth daily as needed for constipation 50 tablet 1         Early ultrasound screening tool:    Does patient have irregular periods?  No  Did patient use hormonal birth control in the three months prior to positive urine pregnancy test? No  Is the patient breastfeeding?  Yes  Is the patient 10 weeks or greater at time of education visit?  Yes    Ban CRYSTAL RN  [facility]

## 2024-10-03 LAB
C TRACH DNA SPEC QL PROBE+SIG AMP: NEGATIVE
HBV SURFACE AG SERPL QL IA: NONREACTIVE
HCV AB SERPL QL IA: NONREACTIVE
HIV 1+2 AB+HIV1 P24 AG SERPL QL IA: NONREACTIVE
N GONORRHOEA DNA SPEC QL NAA+PROBE: NEGATIVE
RUBV IGG SERPL QL IA: 0.82 INDEX
RUBV IGG SERPL QL IA: NORMAL
T PALLIDUM AB SER QL: NONREACTIVE

## 2024-10-04 LAB
BACTERIA UR CULT: NORMAL
LEAD BLDV-MCNC: <2 UG/DL

## 2024-10-08 ENCOUNTER — HOSPITAL ENCOUNTER (OUTPATIENT)
Dept: ULTRASOUND IMAGING | Facility: HOSPITAL | Age: 29
Discharge: HOME | End: 2024-10-08
Attending: FAMILY MEDICINE | Admitting: FAMILY MEDICINE
Payer: COMMERCIAL

## 2024-10-08 PROCEDURE — 76801 OB US < 14 WKS SINGLE FETUS: CPT

## 2024-10-15 ENCOUNTER — PRENATAL OFFICE VISIT (OUTPATIENT)
Dept: FAMILY MEDICINE | Facility: CLINIC | Age: 29
End: 2024-10-15
Payer: COMMERCIAL

## 2024-10-15 VITALS
DIASTOLIC BLOOD PRESSURE: 65 MMHG | TEMPERATURE: 98 F | RESPIRATION RATE: 16 BRPM | WEIGHT: 123 LBS | HEIGHT: 58 IN | BODY MASS INDEX: 25.82 KG/M2 | OXYGEN SATURATION: 97 % | HEART RATE: 64 BPM | SYSTOLIC BLOOD PRESSURE: 103 MMHG

## 2024-10-15 DIAGNOSIS — D50.8 IRON DEFICIENCY ANEMIA SECONDARY TO INADEQUATE DIETARY IRON INTAKE: ICD-10-CM

## 2024-10-15 DIAGNOSIS — Z64.1 GRAND MULTIPARITY: ICD-10-CM

## 2024-10-15 DIAGNOSIS — Z34.90 ENCOUNTER FOR SUPERVISION OF NORMAL PREGNANCY, ANTEPARTUM, UNSPECIFIED GRAVIDITY: Primary | ICD-10-CM

## 2024-10-15 PROCEDURE — 81003 URINALYSIS AUTO W/O SCOPE: CPT | Performed by: FAMILY MEDICINE

## 2024-10-15 PROCEDURE — 36415 COLL VENOUS BLD VENIPUNCTURE: CPT | Performed by: FAMILY MEDICINE

## 2024-10-15 PROCEDURE — 99207 PR PRENATAL VISIT: CPT | Performed by: FAMILY MEDICINE

## 2024-10-15 NOTE — PROGRESS NOTES
First OB Appointment    Assessment & Plan:  Dating: Final EDC is 2025. By LMP  Aspirin: Based on her risk factors, Pa is NOT at high risk of preeclampsia. Low-dose aspirin prophylaxis is NOT recommended for prevention of preeclampsia.   Weight: Based on prepregnancy Could not be calculated, recommended weight gain of Could not be calculated.  Diabetes risk: Based on her risk factors, Pa is NOT at increased risk of DM2/GDM.   Trisomy screening: [facility] collected today.   Carrier screening for SMA and CF: declines  Reviewed iron rich food sources, encouraged PNV daily.   Defers covid vaccine for now.     Order Summary    ICD-10-CM    1. Encounter for supervision of normal pregnancy, antepartum, unspecified   Z34.90 Urinalysis, OB Screen     My[facility] Non-Invasive Prenatal Screening-Prequel     [facility]riad Non-Invasive Prenatal Screening-Prequel      2. Iron deficiency anemia secondary to inadequate dietary iron intake  D50.8       3. Grand multiparity  Z64.1          Future Appointments   Date Time Provider Department Center   2024  2:00 PM [facility]   tion uses voice recognition software, which may contain typographical errors.    Subjective:  This is a unplanned pregnancy. The patient feels good about it. Current pregnancy symptoms include: none.     Preeclampsia risk factors - at increased risk if 1+ high risk or 2+ moderate risk factors  High risk factors (1+):NONE  Moderate risk factors (2+): NONE     Diabetes risk factors - at increased risk if BMI 25+ and 1+ additional risk factors    Prepregnancy Could not be calculated   Additional risk factors (1+): None    I reviewed the following components of the patient chart today:  OB intake note  Active problems  Past Medical History  Medications  Allergies  Family History  Social History  Genetic Questionairre  Prenatal Labs  Prenatal Ultrasound  OB history  OB History    Para Term  AB Living   8 5 5 0 2 5   SAB  "IAB Ectopic Multiple Live Births   2 0 0 0 5      # Outcome Date GA Lbr Markos/2nd Weight Sex Type Anes PTL Lv   8 Current            7 Term 12/03/23 38w2d 07:58 / 00:04 2.3 kg (5 lb 1.1 oz) F Vag-Spont None N CHRIS      Name: Pablo Reilly      Apgar1: 8  Apgar5: 9   6 Term 12/27/22 38w5d 03:00 / 00:02 2.72 kg (5 lb 15.9 oz) F Vag-Spont None N CHRIS      Name: Destini Reilly      Apgar1: 9  Apgar5: 9   5 SAB 02/27/22 10w6d    SAB         Birth Comments: Spontaneous miscarriage. Uncomplicated.   4 Term 07/23/21 39w2d 06:50 / 00:01 3.09 kg (6 lb 13 oz) F Vag-Spont None N CHRIS      Name: Rachelle Reilly      Apgar1: 6  Apgar5: 9   3 SAB 09/22/20 11w6d    SAB         Birth Comments: missed SAB, needed D&C   2 Term 11/30/19 39w2d  2.801 kg (6 lb 2.8 oz) F Vag-Spont None N CHRIS      Name: Lance      Apgar1: 8  Apgar5: 9   1 Term 11/12/18 38w0d 02:25 / 00:05 2.28 kg (5 lb 0.4 oz) F Vag-Spont Local N CHRIS      Name: Kit      Apgar1: 8  Apgar5: 9       Objective:  /65 (BP Location: Left arm, Patient Position: Sitting, Cuff Size: Adult Regular)   Pulse 64   Temp 98  F (36.7  C) (Temporal)   Resp 16   Ht 1.473 m (4' 10\")   Wt 55.8 kg (123 lb)   LMP 07/11/2024 (Exact Date)   SpO2 97%   Breastfeeding No   BMI 25.71 kg/m   Body mass index is 25.71 kg/m .   See prenatal flowsheet and physical exam portion of prenatal episode.    MDM: SURAJ neighborhood: [facility] [id number], language: English, MDM: 40 minutes spent on the date of the encounter doing chart review, history and exam, documentation and further activities per the note.    "

## 2024-10-22 ENCOUNTER — TELEPHONE (OUTPATIENT)
Dept: FAMILY MEDICINE | Facility: CLINIC | Age: 29
End: 2024-10-22
Payer: COMMERCIAL

## 2024-10-22 LAB — SCANNED LAB RESULT: NORMAL

## 2024-10-22 NOTE — TELEPHONE ENCOUNTER
----- Message from Jose J Carvajal sent at 10/22/2024  9:27 AM CDT -----  Please call. Her genetic test was normal. I can't remember if she said she wanted to know the gender but I think she saw it ; )

## 2024-10-24 NOTE — TELEPHONE ENCOUNTER
Called and left message to return call in clinic. If patient returns calls please relay provider message. #3

## 2024-11-10 ENCOUNTER — HEALTH MAINTENANCE LETTER (OUTPATIENT)
Age: 29
End: 2024-11-10

## 2024-11-13 ENCOUNTER — PRENATAL OFFICE VISIT (OUTPATIENT)
Dept: FAMILY MEDICINE | Facility: CLINIC | Age: 29
End: 2024-11-13
Payer: COMMERCIAL

## 2024-11-13 VITALS
BODY MASS INDEX: 24.8 KG/M2 | WEIGHT: 123 LBS | OXYGEN SATURATION: 98 % | TEMPERATURE: 97.5 F | DIASTOLIC BLOOD PRESSURE: 61 MMHG | HEART RATE: 69 BPM | HEIGHT: 59 IN | SYSTOLIC BLOOD PRESSURE: 100 MMHG | RESPIRATION RATE: 21 BRPM

## 2024-11-13 DIAGNOSIS — Z34.90 ENCOUNTER FOR SUPERVISION OF NORMAL PREGNANCY, ANTEPARTUM, UNSPECIFIED GRAVIDITY: Primary | ICD-10-CM

## 2024-11-13 PROCEDURE — 99207 PR PRENATAL VISIT: CPT | Performed by: FAMILY MEDICINE

## 2024-11-13 PROCEDURE — 81003 URINALYSIS AUTO W/O SCOPE: CPT | Performed by: FAMILY MEDICINE

## 2024-11-13 NOTE — PROGRESS NOTES
No ctx, lof, bleeding. No pelvic pain. Some FM.   Feeling a little tired. Has option to work from home and will later.   Not taking her vitamins. Encouraged to do so and daily vitamin D.   Follow up planned in four weeks, will schedule fetal survey at that time.

## 2024-12-10 ENCOUNTER — HOSPITAL ENCOUNTER (OUTPATIENT)
Dept: ULTRASOUND IMAGING | Facility: HOSPITAL | Age: 29
Discharge: HOME | End: 2024-12-10
Attending: FAMILY MEDICINE | Admitting: FAMILY MEDICINE
Payer: COMMERCIAL

## 2024-12-10 ENCOUNTER — PRENATAL OFFICE VISIT (OUTPATIENT)
Dept: FAMILY MEDICINE | Facility: CLINIC | Age: 29
End: 2024-12-10
Payer: COMMERCIAL

## 2024-12-10 VITALS
HEART RATE: 70 BPM | RESPIRATION RATE: 17 BRPM | TEMPERATURE: 97.3 F | WEIGHT: 123 LBS | SYSTOLIC BLOOD PRESSURE: 98 MMHG | BODY MASS INDEX: 25.82 KG/M2 | OXYGEN SATURATION: 99 % | DIASTOLIC BLOOD PRESSURE: 54 MMHG | HEIGHT: 58 IN

## 2024-12-10 DIAGNOSIS — Z34.90 ENCOUNTER FOR SUPERVISION OF NORMAL PREGNANCY, ANTEPARTUM, UNSPECIFIED GRAVIDITY: ICD-10-CM

## 2024-12-10 DIAGNOSIS — Z34.90 ENCOUNTER FOR SUPERVISION OF NORMAL PREGNANCY, ANTEPARTUM, UNSPECIFIED GRAVIDITY: Primary | ICD-10-CM

## 2024-12-10 DIAGNOSIS — D50.8 IRON DEFICIENCY ANEMIA SECONDARY TO INADEQUATE DIETARY IRON INTAKE: ICD-10-CM

## 2024-12-10 DIAGNOSIS — Z64.1 GRAND MULTIPARITY: ICD-10-CM

## 2024-12-10 LAB
ALBUMIN UR-MCNC: 30 MG/DL
GLUCOSE UR STRIP-MCNC: NEGATIVE MG/DL
KETONES UR STRIP-MCNC: 40 MG/DL

## 2024-12-10 PROCEDURE — 99207 PR PRENATAL VISIT: CPT | Performed by: FAMILY MEDICINE

## 2024-12-10 PROCEDURE — 81003 URINALYSIS AUTO W/O SCOPE: CPT | Performed by: FAMILY MEDICINE

## 2024-12-10 PROCEDURE — 76805 OB US >/= 14 WKS SNGL FETUS: CPT

## 2024-12-10 NOTE — PROGRESS NOTES
Feeling well.   No ctx, pelvic pain, bleeding.   Some gastroenteritis going through her kids, she is well so far.   Will schedule fetal survey.   Declines covid vaccine.

## 2025-01-07 ENCOUNTER — PRENATAL OFFICE VISIT (OUTPATIENT)
Dept: FAMILY MEDICINE | Facility: CLINIC | Age: 30
End: 2025-01-07
Payer: COMMERCIAL

## 2025-01-07 VITALS
TEMPERATURE: 97 F | OXYGEN SATURATION: 100 % | SYSTOLIC BLOOD PRESSURE: 102 MMHG | HEART RATE: 76 BPM | BODY MASS INDEX: 23.22 KG/M2 | DIASTOLIC BLOOD PRESSURE: 67 MMHG | HEIGHT: 61 IN | WEIGHT: 123 LBS | RESPIRATION RATE: 16 BRPM

## 2025-01-07 DIAGNOSIS — Z34.92 ENCOUNTER FOR SUPERVISION OF NORMAL PREGNANCY IN SECOND TRIMESTER, UNSPECIFIED GRAVIDITY: Primary | ICD-10-CM

## 2025-01-07 PROCEDURE — 81003 URINALYSIS AUTO W/O SCOPE: CPT | Performed by: FAMILY MEDICINE

## 2025-01-07 PROCEDURE — 99207 PR PRENATAL VISIT: CPT | Performed by: FAMILY MEDICINE

## 2025-01-07 NOTE — PROGRESS NOTES
Feeling well. No ctx, lof, bleeding. +FM   Works from home. Will likely work til labor.   Kids sick again, now respiratory in baby.   Still declines covid vaccine.   Needs repeat imaging for insufficient views of face. This was scheduled.   Follow up in two weeks, will do one hour glucose at that time.

## 2025-01-08 ENCOUNTER — HOSPITAL ENCOUNTER (OUTPATIENT)
Dept: ULTRASOUND IMAGING | Facility: HOSPITAL | Age: 30
Discharge: HOME | End: 2025-01-08
Attending: FAMILY MEDICINE
Payer: COMMERCIAL

## 2025-01-08 DIAGNOSIS — Z34.92 ENCOUNTER FOR SUPERVISION OF NORMAL PREGNANCY IN SECOND TRIMESTER, UNSPECIFIED GRAVIDITY: ICD-10-CM

## 2025-01-08 PROCEDURE — 76816 OB US FOLLOW-UP PER FETUS: CPT

## 2025-01-21 ENCOUNTER — VIRTUAL VISIT (OUTPATIENT)
Dept: FAMILY MEDICINE | Facility: CLINIC | Age: 30
End: 2025-01-21
Payer: COMMERCIAL

## 2025-01-21 DIAGNOSIS — D50.8 IRON DEFICIENCY ANEMIA SECONDARY TO INADEQUATE DIETARY IRON INTAKE: ICD-10-CM

## 2025-01-21 DIAGNOSIS — Z64.1 GRAND MULTIPARITY: ICD-10-CM

## 2025-01-21 DIAGNOSIS — Z34.90 ENCOUNTER FOR SUPERVISION OF NORMAL PREGNANCY, ANTEPARTUM, UNSPECIFIED GRAVIDITY: Primary | ICD-10-CM

## 2025-01-21 PROCEDURE — 99207 PR PRENATAL VISIT: CPT | Mod: 95 | Performed by: FAMILY MEDICINE

## 2025-01-21 NOTE — PROGRESS NOTES
Doing well. No ctx, lof, bleeding. +FM.   Thinking about the patch for birth control.   No history of gestational diabetes. Given option of coming in for one hour glucose or waiting until next visit, she prefers to wait.   Reviewed kick counts, warning signs.   Follow up planned in two weeks.

## 2025-02-04 ENCOUNTER — PRENATAL OFFICE VISIT (OUTPATIENT)
Dept: FAMILY MEDICINE | Facility: CLINIC | Age: 30
End: 2025-02-04
Payer: COMMERCIAL

## 2025-02-04 VITALS
SYSTOLIC BLOOD PRESSURE: 105 MMHG | WEIGHT: 130 LBS | RESPIRATION RATE: 16 BRPM | HEIGHT: 58 IN | BODY MASS INDEX: 27.29 KG/M2 | TEMPERATURE: 98 F | HEART RATE: 73 BPM | OXYGEN SATURATION: 98 % | DIASTOLIC BLOOD PRESSURE: 68 MMHG

## 2025-02-04 DIAGNOSIS — Z34.90 ENCOUNTER FOR SUPERVISION OF NORMAL PREGNANCY, ANTEPARTUM, UNSPECIFIED GRAVIDITY: Primary | ICD-10-CM

## 2025-02-04 DIAGNOSIS — D50.9 IRON DEFICIENCY ANEMIA, UNSPECIFIED IRON DEFICIENCY ANEMIA TYPE: ICD-10-CM

## 2025-02-04 LAB
ALBUMIN UR-MCNC: 30 MG/DL
GLUCOSE 1H P 50 G GLC PO SERPL-MCNC: 91 MG/DL (ref 70–129)
GLUCOSE UR STRIP-MCNC: NEGATIVE MG/DL
HGB BLD-MCNC: 9.7 G/DL (ref 11.7–15.7)
KETONES UR STRIP-MCNC: NEGATIVE MG/DL

## 2025-02-04 PROCEDURE — 81003 URINALYSIS AUTO W/O SCOPE: CPT | Performed by: FAMILY MEDICINE

## 2025-02-04 PROCEDURE — 82950 GLUCOSE TEST: CPT | Performed by: FAMILY MEDICINE

## 2025-02-04 PROCEDURE — 86780 TREPONEMA PALLIDUM: CPT | Performed by: FAMILY MEDICINE

## 2025-02-04 PROCEDURE — 36415 COLL VENOUS BLD VENIPUNCTURE: CPT | Performed by: FAMILY MEDICINE

## 2025-02-04 PROCEDURE — 99207 PR PRENATAL VISIT: CPT | Performed by: FAMILY MEDICINE

## 2025-02-04 RX ORDER — FERROUS GLUCONATE 324(38)MG
324 TABLET ORAL
Qty: 90 TABLET | Refills: 0 | Status: SHIPPED | OUTPATIENT
Start: 2025-02-04

## 2025-02-05 LAB — T PALLIDUM AB SER QL: NONREACTIVE

## 2025-02-18 ENCOUNTER — PRENATAL OFFICE VISIT (OUTPATIENT)
Dept: FAMILY MEDICINE | Facility: CLINIC | Age: 30
End: 2025-02-18
Payer: COMMERCIAL

## 2025-02-18 VITALS
TEMPERATURE: 98.2 F | BODY MASS INDEX: 27.08 KG/M2 | DIASTOLIC BLOOD PRESSURE: 58 MMHG | HEART RATE: 62 BPM | HEIGHT: 58 IN | WEIGHT: 129 LBS | OXYGEN SATURATION: 98 % | SYSTOLIC BLOOD PRESSURE: 96 MMHG | RESPIRATION RATE: 18 BRPM

## 2025-02-18 DIAGNOSIS — D50.9 IRON DEFICIENCY ANEMIA, UNSPECIFIED IRON DEFICIENCY ANEMIA TYPE: ICD-10-CM

## 2025-02-18 DIAGNOSIS — Z64.1 GRAND MULTIPARITY: ICD-10-CM

## 2025-02-18 DIAGNOSIS — Z34.90 ENCOUNTER FOR SUPERVISION OF NORMAL PREGNANCY, ANTEPARTUM, UNSPECIFIED GRAVIDITY: Primary | ICD-10-CM

## 2025-02-18 PROCEDURE — 90715 TDAP VACCINE 7 YRS/> IM: CPT | Performed by: FAMILY MEDICINE

## 2025-02-18 PROCEDURE — 81003 URINALYSIS AUTO W/O SCOPE: CPT | Performed by: FAMILY MEDICINE

## 2025-02-18 PROCEDURE — 99207 PR PRENATAL VISIT: CPT | Performed by: FAMILY MEDICINE

## 2025-02-18 PROCEDURE — 90471 IMMUNIZATION ADMIN: CPT | Performed by: FAMILY MEDICINE

## 2025-02-18 NOTE — PROGRESS NOTES
Feeling well. Has some cramps sometimes no lof, bleeding. Regular FM.   Tdap today.   Reviewed cbc. Is taking iron.   We reviewed warning signs and reasons to call or be seen urgently.   Follow up planned in two weeks.

## 2025-03-04 ENCOUNTER — PRENATAL OFFICE VISIT (OUTPATIENT)
Dept: FAMILY MEDICINE | Facility: CLINIC | Age: 30
End: 2025-03-04
Payer: COMMERCIAL

## 2025-03-04 VITALS
WEIGHT: 131 LBS | DIASTOLIC BLOOD PRESSURE: 58 MMHG | SYSTOLIC BLOOD PRESSURE: 90 MMHG | TEMPERATURE: 97.2 F | BODY MASS INDEX: 27.13 KG/M2 | RESPIRATION RATE: 16 BRPM | HEART RATE: 62 BPM | OXYGEN SATURATION: 99 %

## 2025-03-04 DIAGNOSIS — Z64.1 GRAND MULTIPARITY: ICD-10-CM

## 2025-03-04 DIAGNOSIS — D50.9 IRON DEFICIENCY ANEMIA, UNSPECIFIED IRON DEFICIENCY ANEMIA TYPE: ICD-10-CM

## 2025-03-04 DIAGNOSIS — Z34.90 ENCOUNTER FOR SUPERVISION OF NORMAL PREGNANCY, ANTEPARTUM, UNSPECIFIED GRAVIDITY: Primary | ICD-10-CM

## 2025-03-04 PROCEDURE — 99207 PR PRENATAL VISIT: CPT | Performed by: FAMILY MEDICINE

## 2025-03-04 NOTE — PROGRESS NOTES
Feeling well. Her energy is ok. Some ramandeep marks. No lof, bleeding. Regular FM.   GBS next visit.   Defers hemoglobin follow up, will collect next time.   Labor precautions.

## 2025-03-18 ENCOUNTER — PRENATAL OFFICE VISIT (OUTPATIENT)
Dept: FAMILY MEDICINE | Facility: CLINIC | Age: 30
End: 2025-03-18
Payer: COMMERCIAL

## 2025-03-18 ENCOUNTER — TELEPHONE (OUTPATIENT)
Dept: FAMILY MEDICINE | Facility: CLINIC | Age: 30
End: 2025-03-18

## 2025-03-18 VITALS
TEMPERATURE: 97.3 F | DIASTOLIC BLOOD PRESSURE: 60 MMHG | BODY MASS INDEX: 27.33 KG/M2 | RESPIRATION RATE: 16 BRPM | WEIGHT: 132 LBS | OXYGEN SATURATION: 98 % | SYSTOLIC BLOOD PRESSURE: 104 MMHG | HEART RATE: 65 BPM

## 2025-03-18 DIAGNOSIS — Z3A.35 35 WEEKS GESTATION OF PREGNANCY: ICD-10-CM

## 2025-03-18 DIAGNOSIS — D50.8 IRON DEFICIENCY ANEMIA SECONDARY TO INADEQUATE DIETARY IRON INTAKE: Primary | ICD-10-CM

## 2025-03-18 DIAGNOSIS — D50.9 IRON DEFICIENCY ANEMIA, UNSPECIFIED IRON DEFICIENCY ANEMIA TYPE: ICD-10-CM

## 2025-03-18 DIAGNOSIS — Z34.90 ENCOUNTER FOR SUPERVISION OF NORMAL PREGNANCY, ANTEPARTUM, UNSPECIFIED GRAVIDITY: Primary | ICD-10-CM

## 2025-03-18 DIAGNOSIS — Z64.1 GRAND MULTIPARITY: ICD-10-CM

## 2025-03-18 LAB
ALBUMIN UR-MCNC: 30 MG/DL
GLUCOSE UR STRIP-MCNC: NEGATIVE MG/DL
HGB BLD-MCNC: 9 G/DL (ref 11.7–15.7)
KETONES UR STRIP-MCNC: 15 MG/DL

## 2025-03-18 PROCEDURE — 85018 HEMOGLOBIN: CPT | Performed by: FAMILY MEDICINE

## 2025-03-18 PROCEDURE — 81003 URINALYSIS AUTO W/O SCOPE: CPT | Performed by: FAMILY MEDICINE

## 2025-03-18 PROCEDURE — 36415 COLL VENOUS BLD VENIPUNCTURE: CPT | Performed by: FAMILY MEDICINE

## 2025-03-18 PROCEDURE — 99207 PR PRENATAL VISIT: CPT | Performed by: FAMILY MEDICINE

## 2025-03-18 RX ORDER — ALBUTEROL SULFATE 0.83 MG/ML
2.5 SOLUTION RESPIRATORY (INHALATION)
OUTPATIENT
Start: 2025-03-18

## 2025-03-18 RX ORDER — HEPARIN SODIUM (PORCINE) LOCK FLUSH IV SOLN 100 UNIT/ML 100 UNIT/ML
5 SOLUTION INTRAVENOUS
OUTPATIENT
Start: 2025-03-18

## 2025-03-18 RX ORDER — ALBUTEROL SULFATE 90 UG/1
1-2 INHALANT RESPIRATORY (INHALATION)
Start: 2025-03-18

## 2025-03-18 RX ORDER — DIPHENHYDRAMINE HYDROCHLORIDE 50 MG/ML
50 INJECTION, SOLUTION INTRAMUSCULAR; INTRAVENOUS
Start: 2025-03-18

## 2025-03-18 RX ORDER — HEPARIN SODIUM,PORCINE 10 UNIT/ML
5-20 VIAL (ML) INTRAVENOUS DAILY PRN
OUTPATIENT
Start: 2025-03-18

## 2025-03-18 RX ORDER — DIPHENHYDRAMINE HYDROCHLORIDE 50 MG/ML
25 INJECTION, SOLUTION INTRAMUSCULAR; INTRAVENOUS
Start: 2025-03-18

## 2025-03-18 RX ORDER — METHYLPREDNISOLONE SODIUM SUCCINATE 40 MG/ML
40 INJECTION INTRAMUSCULAR; INTRAVENOUS
Start: 2025-03-18

## 2025-03-18 RX ORDER — EPINEPHRINE 1 MG/ML
0.3 INJECTION, SOLUTION, CONCENTRATE INTRAVENOUS EVERY 5 MIN PRN
OUTPATIENT
Start: 2025-03-18

## 2025-03-18 RX ORDER — MEPERIDINE HYDROCHLORIDE 25 MG/ML
25 INJECTION INTRAMUSCULAR; INTRAVENOUS; SUBCUTANEOUS
OUTPATIENT
Start: 2025-03-18

## 2025-03-18 NOTE — TELEPHONE ENCOUNTER
----- Message from Azebtoni Wei sent at 3/18/2025  3:23 PM CDT -----  Please call.   Her hemoglobin is a bit lower now. I know she might not be liking the iron pill that was prescribed. We did not talk about this but since I seen the hemoglobin is worse, would she consider iron infusions, x 3 I would order and she can schedule at her leisure in the next couple weeks? If yes let me know. Otherwise please take iron pill twice per day.

## 2025-03-18 NOTE — TELEPHONE ENCOUNTER
Called patient.  She would like to try iron infusion x3, as she is not tolerating oral iron.  Infusion plan pended - please review and sign.  Patient advised she will receive call from infusion center for scheduling.    Lizbeth Hennessy RN  [facility]

## 2025-03-26 ENCOUNTER — INFUSION THERAPY VISIT (OUTPATIENT)
Dept: INFUSION THERAPY | Facility: HOSPITAL | Age: 30
End: 2025-03-26
Payer: COMMERCIAL

## 2025-03-26 VITALS
DIASTOLIC BLOOD PRESSURE: 58 MMHG | SYSTOLIC BLOOD PRESSURE: 110 MMHG | RESPIRATION RATE: 16 BRPM | TEMPERATURE: 98.2 F | HEART RATE: 78 BPM | OXYGEN SATURATION: 97 %

## 2025-03-26 DIAGNOSIS — Z3A.35 35 WEEKS GESTATION OF PREGNANCY: Primary | ICD-10-CM

## 2025-03-26 DIAGNOSIS — D50.8 IRON DEFICIENCY ANEMIA SECONDARY TO INADEQUATE DIETARY IRON INTAKE: ICD-10-CM

## 2025-03-26 PROCEDURE — 96365 THER/PROPH/DIAG IV INF INIT: CPT

## 2025-03-26 PROCEDURE — 258N000003 HC RX IP 258 OP 636: Performed by: FAMILY MEDICINE

## 2025-03-26 PROCEDURE — 96366 THER/PROPH/DIAG IV INF ADDON: CPT

## 2025-03-26 PROCEDURE — 250N000011 HC RX IP 250 OP 636: Performed by: FAMILY MEDICINE

## 2025-03-26 RX ORDER — MEPERIDINE HYDROCHLORIDE 25 MG/ML
25 INJECTION INTRAMUSCULAR; INTRAVENOUS; SUBCUTANEOUS
Status: DISCONTINUED | OUTPATIENT
Start: 2025-03-26 | End: 2025-03-26 | Stop reason: HOSPADM

## 2025-03-26 RX ORDER — ALBUTEROL SULFATE 0.83 MG/ML
2.5 SOLUTION RESPIRATORY (INHALATION)
Status: DISCONTINUED | OUTPATIENT
Start: 2025-03-26 | End: 2025-03-26 | Stop reason: HOSPADM

## 2025-03-26 RX ORDER — ALBUTEROL SULFATE 90 UG/1
1-2 INHALANT RESPIRATORY (INHALATION)
Status: DISCONTINUED | OUTPATIENT
Start: 2025-03-26 | End: 2025-03-26 | Stop reason: HOSPADM

## 2025-03-26 RX ORDER — METHYLPREDNISOLONE SODIUM SUCCINATE 40 MG/ML
40 INJECTION INTRAMUSCULAR; INTRAVENOUS
Status: DISCONTINUED | OUTPATIENT
Start: 2025-03-26 | End: 2025-03-26 | Stop reason: HOSPADM

## 2025-03-26 RX ORDER — EPINEPHRINE 1 MG/ML
0.3 INJECTION, SOLUTION INTRAMUSCULAR; SUBCUTANEOUS EVERY 5 MIN PRN
OUTPATIENT
Start: 2025-03-28

## 2025-03-26 RX ORDER — DIPHENHYDRAMINE HYDROCHLORIDE 50 MG/ML
50 INJECTION, SOLUTION INTRAMUSCULAR; INTRAVENOUS
Status: DISCONTINUED | OUTPATIENT
Start: 2025-03-26 | End: 2025-03-26 | Stop reason: HOSPADM

## 2025-03-26 RX ORDER — HEPARIN SODIUM,PORCINE 10 UNIT/ML
5-20 VIAL (ML) INTRAVENOUS DAILY PRN
OUTPATIENT
Start: 2025-03-28

## 2025-03-26 RX ORDER — ALBUTEROL SULFATE 90 UG/1
1-2 INHALANT RESPIRATORY (INHALATION)
Start: 2025-03-28

## 2025-03-26 RX ORDER — DIPHENHYDRAMINE HYDROCHLORIDE 50 MG/ML
25 INJECTION, SOLUTION INTRAMUSCULAR; INTRAVENOUS
Start: 2025-03-28

## 2025-03-26 RX ORDER — DIPHENHYDRAMINE HYDROCHLORIDE 50 MG/ML
25 INJECTION, SOLUTION INTRAMUSCULAR; INTRAVENOUS
Status: DISCONTINUED | OUTPATIENT
Start: 2025-03-26 | End: 2025-03-26 | Stop reason: HOSPADM

## 2025-03-26 RX ORDER — HEPARIN SODIUM (PORCINE) LOCK FLUSH IV SOLN 100 UNIT/ML 100 UNIT/ML
5 SOLUTION INTRAVENOUS
OUTPATIENT
Start: 2025-03-28

## 2025-03-26 RX ORDER — ALBUTEROL SULFATE 0.83 MG/ML
2.5 SOLUTION RESPIRATORY (INHALATION)
OUTPATIENT
Start: 2025-03-28

## 2025-03-26 RX ORDER — EPINEPHRINE 1 MG/ML
0.3 INJECTION, SOLUTION INTRAMUSCULAR; SUBCUTANEOUS EVERY 5 MIN PRN
Status: DISCONTINUED | OUTPATIENT
Start: 2025-03-26 | End: 2025-03-26 | Stop reason: HOSPADM

## 2025-03-26 RX ORDER — MEPERIDINE HYDROCHLORIDE 25 MG/ML
25 INJECTION INTRAMUSCULAR; INTRAVENOUS; SUBCUTANEOUS
OUTPATIENT
Start: 2025-03-28

## 2025-03-26 RX ORDER — METHYLPREDNISOLONE SODIUM SUCCINATE 40 MG/ML
40 INJECTION INTRAMUSCULAR; INTRAVENOUS
Start: 2025-03-28

## 2025-03-26 RX ORDER — DIPHENHYDRAMINE HYDROCHLORIDE 50 MG/ML
50 INJECTION, SOLUTION INTRAMUSCULAR; INTRAVENOUS
Start: 2025-03-28

## 2025-03-26 RX ADMIN — SODIUM CHLORIDE 250 ML: 0.9 INJECTION, SOLUTION INTRAVENOUS at 13:28

## 2025-03-26 RX ADMIN — IRON SUCROSE 300 MG: 20 INJECTION, SOLUTION INTRAVENOUS at 13:28

## 2025-03-26 NOTE — PROGRESS NOTES
Infusion Nursing Note:  Artie Rapp presents today for IV Venofer 1/3.    Patient seen by provider today: No   present during visit today: Not Applicable.    Note: 36 6/7 weeks pregnant, vs and assessment completed, pt reports that she has had iron in the past without incident,  medication education reinforced,  medication infused as ordered without incident. Pt will return 3/28.      Intravenous Access:  Peripheral IV placed.    Treatment Conditions:  Not Applicable.      Post Infusion Assessment:  Patient tolerated infusion without incident.  No evidence of extravasations.  Access discontinued per protocol.       Discharge Plan:   Discharge instructions reviewed with: Patient.  Patient and/or family verbalized understanding of discharge instructions and all questions answered.  Patient discharged in stable condition accompanied by: self.  Departure Mode: Ambulatory.      Stephanie Rodriguez RN

## 2025-03-28 PROCEDURE — 87653 STREP B DNA AMP PROBE: CPT | Performed by: FAMILY MEDICINE

## 2025-03-29 LAB — GP B STREP DNA SPEC QL NAA+PROBE: NEGATIVE

## 2025-03-31 ENCOUNTER — INFUSION THERAPY VISIT (OUTPATIENT)
Dept: INFUSION THERAPY | Facility: HOSPITAL | Age: 30
End: 2025-03-31
Payer: COMMERCIAL

## 2025-03-31 VITALS
OXYGEN SATURATION: 100 % | RESPIRATION RATE: 16 BRPM | HEART RATE: 53 BPM | SYSTOLIC BLOOD PRESSURE: 93 MMHG | TEMPERATURE: 98 F | DIASTOLIC BLOOD PRESSURE: 43 MMHG

## 2025-03-31 DIAGNOSIS — D50.8 IRON DEFICIENCY ANEMIA SECONDARY TO INADEQUATE DIETARY IRON INTAKE: ICD-10-CM

## 2025-03-31 DIAGNOSIS — Z3A.35 35 WEEKS GESTATION OF PREGNANCY: Primary | ICD-10-CM

## 2025-03-31 PROCEDURE — 96365 THER/PROPH/DIAG IV INF INIT: CPT

## 2025-03-31 PROCEDURE — 250N000011 HC RX IP 250 OP 636: Performed by: FAMILY MEDICINE

## 2025-03-31 PROCEDURE — 258N000003 HC RX IP 258 OP 636: Performed by: FAMILY MEDICINE

## 2025-03-31 PROCEDURE — 96366 THER/PROPH/DIAG IV INF ADDON: CPT

## 2025-03-31 RX ORDER — EPINEPHRINE 1 MG/ML
0.3 INJECTION, SOLUTION INTRAMUSCULAR; SUBCUTANEOUS EVERY 5 MIN PRN
Status: CANCELLED | OUTPATIENT
Start: 2025-04-01

## 2025-03-31 RX ORDER — METHYLPREDNISOLONE SODIUM SUCCINATE 40 MG/ML
40 INJECTION INTRAMUSCULAR; INTRAVENOUS
Status: CANCELLED
Start: 2025-04-01

## 2025-03-31 RX ORDER — ALBUTEROL SULFATE 90 UG/1
1-2 INHALANT RESPIRATORY (INHALATION)
Status: CANCELLED
Start: 2025-04-01

## 2025-03-31 RX ORDER — HEPARIN SODIUM,PORCINE 10 UNIT/ML
5-20 VIAL (ML) INTRAVENOUS DAILY PRN
Status: CANCELLED | OUTPATIENT
Start: 2025-04-01

## 2025-03-31 RX ORDER — DIPHENHYDRAMINE HYDROCHLORIDE 50 MG/ML
50 INJECTION, SOLUTION INTRAMUSCULAR; INTRAVENOUS
Status: CANCELLED
Start: 2025-04-01

## 2025-03-31 RX ORDER — HEPARIN SODIUM (PORCINE) LOCK FLUSH IV SOLN 100 UNIT/ML 100 UNIT/ML
5 SOLUTION INTRAVENOUS
Status: CANCELLED | OUTPATIENT
Start: 2025-04-01

## 2025-03-31 RX ORDER — ALBUTEROL SULFATE 0.83 MG/ML
2.5 SOLUTION RESPIRATORY (INHALATION)
Status: CANCELLED | OUTPATIENT
Start: 2025-04-01

## 2025-03-31 RX ORDER — MEPERIDINE HYDROCHLORIDE 25 MG/ML
25 INJECTION INTRAMUSCULAR; INTRAVENOUS; SUBCUTANEOUS
Status: CANCELLED | OUTPATIENT
Start: 2025-04-01

## 2025-03-31 RX ORDER — DIPHENHYDRAMINE HYDROCHLORIDE 50 MG/ML
25 INJECTION, SOLUTION INTRAMUSCULAR; INTRAVENOUS
Status: CANCELLED
Start: 2025-04-01

## 2025-03-31 RX ADMIN — SODIUM CHLORIDE 250 ML: 0.9 INJECTION, SOLUTION INTRAVENOUS at 13:26

## 2025-03-31 RX ADMIN — IRON SUCROSE 300 MG: 20 INJECTION, SOLUTION INTRAVENOUS at 13:26

## 2025-03-31 NOTE — PROGRESS NOTES
Feeling well. No ctx, lof, bleeding. +FM.   Long history of  anemia.   Not really taking her iron, hard to keep track of. Trying to eat iron rich foods. Not sure if it is working.   Recheck hemoglobin today.   We discussed options of iron replacement, would consider iron infusion if needed.

## 2025-03-31 NOTE — PROGRESS NOTES
Infusion Nursing Note:  Pa Nnamdi Rapp presents today for Venofer 300mg, Dose 3 of 3.    Patient seen by provider today: No   present during visit today: Not Applicable.    Note: Pa comes in today for her last dose of Venofer 300 mg. She has tolerated her iron so far and not having any fatigue. I went over the plan of care with Pa and she verbalized understanding. PIV placed with great blood return throughout. Venofer hung over 90 minutes and then flushed clear with normal saline. Pa tolerated with no sign or symptom of a reaction. PIV removed and then covered with gauze and coban. Pa left ambulatory to the [facility] in stable condition.       Intravenous Access:  Peripheral IV placed.    Treatment Conditions:  Not Applicable.      Post Infusion Assessment:  Patient tolerated infusion without incident.  Blood return noted pre and post infusion.  Site patent and intact, free from redness, edema or discomfort.  No evidence of extravasations.  Access discontinued per protocol.       Discharge Plan:   AVS to patient via MYCHART.    Patient discharged in stable condition accompanied by: self.  Departure Mode: Ambulatory.      DEB CHESTER RN

## 2025-04-04 ENCOUNTER — HOSPITAL ENCOUNTER (OUTPATIENT)
Dept: ULTRASOUND IMAGING | Facility: HOSPITAL | Age: 30
Discharge: HOME | End: 2025-04-04
Payer: COMMERCIAL

## 2025-04-04 DIAGNOSIS — O36.8130 DECREASED FETAL MOVEMENTS IN THIRD TRIMESTER, SINGLE OR UNSPECIFIED FETUS: ICD-10-CM

## 2025-04-04 DIAGNOSIS — Z34.93 ENCOUNTER FOR SUPERVISION OF NORMAL PREGNANCY IN THIRD TRIMESTER, UNSPECIFIED GRAVIDITY: ICD-10-CM

## 2025-04-04 PROCEDURE — 76819 FETAL BIOPHYS PROFIL W/O NST: CPT

## 2025-04-08 ENCOUNTER — PRENATAL OFFICE VISIT (OUTPATIENT)
Dept: FAMILY MEDICINE | Facility: CLINIC | Age: 30
End: 2025-04-08
Payer: COMMERCIAL

## 2025-04-08 VITALS
BODY MASS INDEX: 27.21 KG/M2 | HEIGHT: 59 IN | HEART RATE: 63 BPM | RESPIRATION RATE: 16 BRPM | TEMPERATURE: 97.3 F | SYSTOLIC BLOOD PRESSURE: 99 MMHG | DIASTOLIC BLOOD PRESSURE: 62 MMHG | WEIGHT: 135 LBS | OXYGEN SATURATION: 98 %

## 2025-04-08 DIAGNOSIS — Z34.93 ENCOUNTER FOR SUPERVISION OF NORMAL PREGNANCY IN THIRD TRIMESTER, UNSPECIFIED GRAVIDITY: Primary | ICD-10-CM

## 2025-04-08 DIAGNOSIS — D50.8 IRON DEFICIENCY ANEMIA SECONDARY TO INADEQUATE DIETARY IRON INTAKE: ICD-10-CM

## 2025-04-08 PROCEDURE — 81003 URINALYSIS AUTO W/O SCOPE: CPT | Performed by: FAMILY MEDICINE

## 2025-04-08 PROCEDURE — 99207 PR PRENATAL VISIT: CPT | Performed by: FAMILY MEDICINE

## 2025-04-08 NOTE — PROGRESS NOTES
Contractions stopped since last week. No more pelvic pressure. No lof or bleeding.   CE as above.   Labor precautions. Discussed warning signs and reasons to be seen urgently. Has contact information for L and D.   Follow up in one week.

## 2025-04-15 ENCOUNTER — PRENATAL OFFICE VISIT (OUTPATIENT)
Dept: FAMILY MEDICINE | Facility: CLINIC | Age: 30
End: 2025-04-15
Payer: COMMERCIAL

## 2025-04-15 VITALS
SYSTOLIC BLOOD PRESSURE: 98 MMHG | BODY MASS INDEX: 27.62 KG/M2 | DIASTOLIC BLOOD PRESSURE: 54 MMHG | HEIGHT: 59 IN | WEIGHT: 137 LBS | TEMPERATURE: 97.3 F | HEART RATE: 67 BPM | OXYGEN SATURATION: 97 % | RESPIRATION RATE: 21 BRPM

## 2025-04-15 DIAGNOSIS — D50.8 IRON DEFICIENCY ANEMIA SECONDARY TO INADEQUATE DIETARY IRON INTAKE: ICD-10-CM

## 2025-04-15 DIAGNOSIS — Z64.1 GRAND MULTIPARITY: ICD-10-CM

## 2025-04-15 DIAGNOSIS — Z34.93 ENCOUNTER FOR SUPERVISION OF NORMAL PREGNANCY IN THIRD TRIMESTER, UNSPECIFIED GRAVIDITY: Primary | ICD-10-CM

## 2025-04-15 DIAGNOSIS — O48.0 POST-TERM PREGNANCY, 40-42 WEEKS OF GESTATION: ICD-10-CM

## 2025-04-15 PROCEDURE — 99207 PR PRENATAL VISIT: CPT | Performed by: FAMILY MEDICINE

## 2025-04-15 NOTE — PROGRESS NOTES
Doing well.  She has everything ready to go.  She has her carseat.    Reviewed when to go to the hospital  Reviewed carseat.      Needs hgb and GBS at next visit .   
How Severe Are Your Spot(S)?: mild
What Type Of Note Output Would You Prefer (Optional)?: Standard Output
What Is The Reason For Today's Visit?: Full Body Skin Examination
What Is The Reason For Today's Visit? (Being Monitored For X): concerning skin lesions on an annual basis

## 2025-04-16 NOTE — PROGRESS NOTES
No ctx, lof, bleeding. No pressure. Regular FM.   Discussed options of induction of labor. She defers for now.   BPP at the end of the week.   Will call or check in if she changes her mind regarding induction. Warning signs and reasons to be seen urgently reviewed. Will check hemoglobin when she presents for labor.

## 2025-04-17 ENCOUNTER — HOSPITAL ENCOUNTER (INPATIENT)
Facility: HOSPITAL | Age: 30
LOS: 1 days | Discharge: HOME | End: 2025-04-19
Attending: FAMILY MEDICINE | Admitting: FAMILY MEDICINE
Payer: COMMERCIAL

## 2025-04-17 DIAGNOSIS — D50.8 IRON DEFICIENCY ANEMIA SECONDARY TO INADEQUATE DIETARY IRON INTAKE: ICD-10-CM

## 2025-04-17 DIAGNOSIS — Z64.1 GRAND MULTIPARITY: ICD-10-CM

## 2025-04-17 DIAGNOSIS — Z34.80 SUPERVISION OF OTHER NORMAL PREGNANCY, ANTEPARTUM: ICD-10-CM

## 2025-04-18 PROBLEM — Z34.90 PREGNANCY: Status: ACTIVE | Noted: 2025-04-18

## 2025-04-18 PROBLEM — Z64.1 GRAND MULTIPARITY: Status: ACTIVE | Noted: 2025-04-18

## 2025-04-18 PROBLEM — Z34.80 SUPERVISION OF OTHER NORMAL PREGNANCY, ANTEPARTUM: Status: ACTIVE | Noted: 2025-04-18

## 2025-04-18 PROBLEM — D50.8 IRON DEFICIENCY ANEMIA SECONDARY TO INADEQUATE DIETARY IRON INTAKE: Status: ACTIVE | Noted: 2023-12-03

## 2025-04-18 PROBLEM — O36.5990 FETAL GROWTH RESTRICTION ANTEPARTUM: Status: RESOLVED | Noted: 2023-12-04 | Resolved: 2025-04-18

## 2025-04-18 PROBLEM — D62 ANEMIA DUE TO BLOOD LOSS, ACUTE: Status: RESOLVED | Noted: 2023-12-04 | Resolved: 2025-04-18

## 2025-04-18 LAB
ABO + RH BLD: NORMAL
BLD GP AB SCN SERPL QL: NEGATIVE
ERYTHROCYTE [DISTWIDTH] IN BLOOD BY AUTOMATED COUNT: 22.9 % (ref 10–15)
HCT VFR BLD AUTO: 35 % (ref 35–47)
HGB BLD-MCNC: 11 G/DL (ref 11.7–15.7)
MCH RBC QN AUTO: 30.8 PG (ref 26.5–33)
MCHC RBC AUTO-ENTMCNC: 31.4 G/DL (ref 31.5–36.5)
MCV RBC AUTO: 98 FL (ref 78–100)
PLATELET # BLD AUTO: 292 10E3/UL (ref 150–450)
RBC # BLD AUTO: 3.57 10E6/UL (ref 3.8–5.2)
SPECIMEN EXP DATE BLD: NORMAL
T PALLIDUM AB SER QL: NONREACTIVE
WBC # BLD AUTO: 12.3 10E3/UL (ref 4–11)

## 2025-04-18 PROCEDURE — 722N000001 HC LABOR CARE VAGINAL DELIVERY SINGLE

## 2025-04-18 PROCEDURE — 36415 COLL VENOUS BLD VENIPUNCTURE: CPT | Performed by: FAMILY MEDICINE

## 2025-04-18 PROCEDURE — 86901 BLOOD TYPING SEROLOGIC RH(D): CPT | Performed by: FAMILY MEDICINE

## 2025-04-18 PROCEDURE — 250N000013 HC RX MED GY IP 250 OP 250 PS 637: Performed by: FAMILY MEDICINE

## 2025-04-18 PROCEDURE — 250N000011 HC RX IP 250 OP 636: Mod: JZ | Performed by: FAMILY MEDICINE

## 2025-04-18 PROCEDURE — 120N000001 HC R&B MED SURG/OB

## 2025-04-18 PROCEDURE — 85014 HEMATOCRIT: CPT | Performed by: FAMILY MEDICINE

## 2025-04-18 PROCEDURE — 10907ZC DRAINAGE OF AMNIOTIC FLUID, THERAPEUTIC FROM PRODUCTS OF CONCEPTION, VIA NATURAL OR ARTIFICIAL OPENING: ICD-10-PCS | Performed by: FAMILY MEDICINE

## 2025-04-18 PROCEDURE — 59400 OBSTETRICAL CARE: CPT | Performed by: FAMILY MEDICINE

## 2025-04-18 PROCEDURE — 86780 TREPONEMA PALLIDUM: CPT | Performed by: FAMILY MEDICINE

## 2025-04-18 PROCEDURE — 250N000009 HC RX 250: Performed by: FAMILY MEDICINE

## 2025-04-18 RX ORDER — IBUPROFEN 800 MG/1
800 TABLET, FILM COATED ORAL EVERY 6 HOURS PRN
Status: DISCONTINUED | OUTPATIENT
Start: 2025-04-18 | End: 2025-04-19 | Stop reason: HOSPADM

## 2025-04-18 RX ORDER — TERBUTALINE SULFATE 1 MG/ML
0.25 INJECTION SUBCUTANEOUS
Status: DISCONTINUED | OUTPATIENT
Start: 2025-04-18 | End: 2025-04-18 | Stop reason: HOSPADM

## 2025-04-18 RX ORDER — OXYTOCIN 10 [USP'U]/ML
10 INJECTION, SOLUTION INTRAMUSCULAR; INTRAVENOUS
Status: DISCONTINUED | OUTPATIENT
Start: 2025-04-18 | End: 2025-04-18 | Stop reason: HOSPADM

## 2025-04-18 RX ORDER — METHYLERGONOVINE MALEATE 0.2 MG/ML
200 INJECTION INTRAVENOUS
Status: DISCONTINUED | OUTPATIENT
Start: 2025-04-18 | End: 2025-04-18 | Stop reason: HOSPADM

## 2025-04-18 RX ORDER — CARBOPROST TROMETHAMINE 250 UG/ML
250 INJECTION, SOLUTION INTRAMUSCULAR
Status: DISCONTINUED | OUTPATIENT
Start: 2025-04-18 | End: 2025-04-19 | Stop reason: HOSPADM

## 2025-04-18 RX ORDER — METOCLOPRAMIDE HYDROCHLORIDE 5 MG/ML
10 INJECTION INTRAMUSCULAR; INTRAVENOUS EVERY 6 HOURS PRN
Status: DISCONTINUED | OUTPATIENT
Start: 2025-04-18 | End: 2025-04-18 | Stop reason: HOSPADM

## 2025-04-18 RX ORDER — MISOPROSTOL 200 UG/1
400 TABLET ORAL
Status: DISCONTINUED | OUTPATIENT
Start: 2025-04-18 | End: 2025-04-18 | Stop reason: HOSPADM

## 2025-04-18 RX ORDER — OXYTOCIN/0.9 % SODIUM CHLORIDE 30/500 ML
340 PLASTIC BAG, INJECTION (ML) INTRAVENOUS CONTINUOUS PRN
Status: DISCONTINUED | OUTPATIENT
Start: 2025-04-18 | End: 2025-04-19 | Stop reason: HOSPADM

## 2025-04-18 RX ORDER — LOPERAMIDE HYDROCHLORIDE 2 MG/1
4 CAPSULE ORAL
Status: DISCONTINUED | OUTPATIENT
Start: 2025-04-18 | End: 2025-04-18 | Stop reason: HOSPADM

## 2025-04-18 RX ORDER — PRENATAL VIT/IRON FUM/FOLIC AC 27MG-0.8MG
1 TABLET ORAL EVERY EVENING
Status: DISCONTINUED | OUTPATIENT
Start: 2025-04-18 | End: 2025-04-19 | Stop reason: HOSPADM

## 2025-04-18 RX ORDER — MISOPROSTOL 200 UG/1
400 TABLET ORAL
Status: DISCONTINUED | OUTPATIENT
Start: 2025-04-18 | End: 2025-04-19 | Stop reason: HOSPADM

## 2025-04-18 RX ORDER — HYDROCORTISONE 25 MG/G
CREAM TOPICAL 3 TIMES DAILY PRN
Status: DISCONTINUED | OUTPATIENT
Start: 2025-04-18 | End: 2025-04-19 | Stop reason: HOSPADM

## 2025-04-18 RX ORDER — METOCLOPRAMIDE 10 MG/1
10 TABLET ORAL EVERY 6 HOURS PRN
Status: DISCONTINUED | OUTPATIENT
Start: 2025-04-18 | End: 2025-04-18 | Stop reason: HOSPADM

## 2025-04-18 RX ORDER — OXYTOCIN 10 [USP'U]/ML
10 INJECTION, SOLUTION INTRAMUSCULAR; INTRAVENOUS
Status: DISCONTINUED | OUTPATIENT
Start: 2025-04-18 | End: 2025-04-19 | Stop reason: HOSPADM

## 2025-04-18 RX ORDER — POLYETHYLENE GLYCOL 3350 17 G/17G
17 POWDER, FOR SOLUTION ORAL DAILY PRN
Status: DISCONTINUED | OUTPATIENT
Start: 2025-04-18 | End: 2025-04-19 | Stop reason: HOSPADM

## 2025-04-18 RX ORDER — TRANEXAMIC ACID 10 MG/ML
1 INJECTION, SOLUTION INTRAVENOUS EVERY 30 MIN PRN
Status: DISCONTINUED | OUTPATIENT
Start: 2025-04-18 | End: 2025-04-18 | Stop reason: HOSPADM

## 2025-04-18 RX ORDER — KETOROLAC TROMETHAMINE 15 MG/ML
15 INJECTION, SOLUTION INTRAMUSCULAR; INTRAVENOUS
Status: COMPLETED | OUTPATIENT
Start: 2025-04-18 | End: 2025-04-18

## 2025-04-18 RX ORDER — OXYTOCIN/0.9 % SODIUM CHLORIDE 30/500 ML
100-340 PLASTIC BAG, INJECTION (ML) INTRAVENOUS CONTINUOUS PRN
Status: DISCONTINUED | OUTPATIENT
Start: 2025-04-18 | End: 2025-04-19 | Stop reason: HOSPADM

## 2025-04-18 RX ORDER — ACETAMINOPHEN 325 MG/1
650 TABLET ORAL EVERY 4 HOURS PRN
Status: DISCONTINUED | OUTPATIENT
Start: 2025-04-18 | End: 2025-04-19 | Stop reason: HOSPADM

## 2025-04-18 RX ORDER — MISOPROSTOL 200 UG/1
800 TABLET ORAL
Status: DISCONTINUED | OUTPATIENT
Start: 2025-04-18 | End: 2025-04-19 | Stop reason: HOSPADM

## 2025-04-18 RX ORDER — CARBOPROST TROMETHAMINE 250 UG/ML
250 INJECTION, SOLUTION INTRAMUSCULAR
Status: DISCONTINUED | OUTPATIENT
Start: 2025-04-18 | End: 2025-04-18 | Stop reason: HOSPADM

## 2025-04-18 RX ORDER — CITRIC ACID/SODIUM CITRATE 334-500MG
30 SOLUTION, ORAL ORAL
Status: DISCONTINUED | OUTPATIENT
Start: 2025-04-18 | End: 2025-04-18 | Stop reason: HOSPADM

## 2025-04-18 RX ORDER — METHYLERGONOVINE MALEATE 0.2 MG/ML
200 INJECTION INTRAVENOUS
Status: DISCONTINUED | OUTPATIENT
Start: 2025-04-18 | End: 2025-04-19 | Stop reason: HOSPADM

## 2025-04-18 RX ORDER — TRANEXAMIC ACID 10 MG/ML
1 INJECTION, SOLUTION INTRAVENOUS EVERY 30 MIN PRN
Status: DISCONTINUED | OUTPATIENT
Start: 2025-04-18 | End: 2025-04-19 | Stop reason: HOSPADM

## 2025-04-18 RX ORDER — BISACODYL 10 MG
10 SUPPOSITORY, RECTAL RECTAL DAILY PRN
Status: DISCONTINUED | OUTPATIENT
Start: 2025-04-18 | End: 2025-04-19 | Stop reason: HOSPADM

## 2025-04-18 RX ORDER — FERROUS GLUCONATE 324(38)MG
324 TABLET ORAL
Status: DISCONTINUED | OUTPATIENT
Start: 2025-04-18 | End: 2025-04-19 | Stop reason: HOSPADM

## 2025-04-18 RX ORDER — ONDANSETRON 2 MG/ML
4 INJECTION INTRAMUSCULAR; INTRAVENOUS EVERY 6 HOURS PRN
Status: DISCONTINUED | OUTPATIENT
Start: 2025-04-18 | End: 2025-04-18 | Stop reason: HOSPADM

## 2025-04-18 RX ORDER — LOPERAMIDE HYDROCHLORIDE 2 MG/1
4 CAPSULE ORAL
Status: DISCONTINUED | OUTPATIENT
Start: 2025-04-18 | End: 2025-04-19 | Stop reason: HOSPADM

## 2025-04-18 RX ORDER — IBUPROFEN 800 MG/1
800 TABLET, FILM COATED ORAL
Status: COMPLETED | OUTPATIENT
Start: 2025-04-18 | End: 2025-04-18

## 2025-04-18 RX ORDER — MISOPROSTOL 200 UG/1
800 TABLET ORAL
Status: DISCONTINUED | OUTPATIENT
Start: 2025-04-18 | End: 2025-04-18 | Stop reason: HOSPADM

## 2025-04-18 RX ORDER — AMOXICILLIN 250 MG
2 CAPSULE ORAL
Status: DISCONTINUED | OUTPATIENT
Start: 2025-04-18 | End: 2025-04-19 | Stop reason: HOSPADM

## 2025-04-18 RX ORDER — OXYTOCIN/0.9 % SODIUM CHLORIDE 30/500 ML
340 PLASTIC BAG, INJECTION (ML) INTRAVENOUS CONTINUOUS PRN
Status: DISCONTINUED | OUTPATIENT
Start: 2025-04-18 | End: 2025-04-18 | Stop reason: HOSPADM

## 2025-04-18 RX ORDER — LOPERAMIDE HYDROCHLORIDE 2 MG/1
2 CAPSULE ORAL
Status: DISCONTINUED | OUTPATIENT
Start: 2025-04-18 | End: 2025-04-19 | Stop reason: HOSPADM

## 2025-04-18 RX ORDER — LOPERAMIDE HYDROCHLORIDE 2 MG/1
2 CAPSULE ORAL
Status: DISCONTINUED | OUTPATIENT
Start: 2025-04-18 | End: 2025-04-18 | Stop reason: HOSPADM

## 2025-04-18 RX ORDER — ONDANSETRON 4 MG/1
4 TABLET, ORALLY DISINTEGRATING ORAL EVERY 6 HOURS PRN
Status: DISCONTINUED | OUTPATIENT
Start: 2025-04-18 | End: 2025-04-18 | Stop reason: HOSPADM

## 2025-04-18 RX ORDER — PROCHLORPERAZINE MALEATE 10 MG
10 TABLET ORAL EVERY 6 HOURS PRN
Status: DISCONTINUED | OUTPATIENT
Start: 2025-04-18 | End: 2025-04-18 | Stop reason: HOSPADM

## 2025-04-18 RX ADMIN — Medication 340 ML/HR: at 02:31

## 2025-04-18 RX ADMIN — PSYLLIUM HUSK 1 PACKET: 3.4 POWDER ORAL at 09:26

## 2025-04-18 RX ADMIN — FERROUS GLUCONATE 324 MG: 324 TABLET ORAL at 08:50

## 2025-04-18 RX ADMIN — KETOROLAC TROMETHAMINE 15 MG: 15 INJECTION, SOLUTION INTRAMUSCULAR; INTRAVENOUS at 02:56

## 2025-04-18 RX ADMIN — PRENATAL VIT W/ FE FUMARATE-FA TAB 27-0.8 MG 1 TABLET: 27-0.8 TAB at 20:32

## 2025-04-18 RX ADMIN — SENNOSIDES AND DOCUSATE SODIUM 2 TABLET: 50; 8.6 TABLET ORAL at 20:32

## 2025-04-18 ASSESSMENT — ACTIVITIES OF DAILY LIVING (ADL)
ADLS_ACUITY_SCORE: 28
ADLS_ACUITY_SCORE: 30
ADLS_ACUITY_SCORE: 28
ADLS_ACUITY_SCORE: 30
ADLS_ACUITY_SCORE: 28
ADLS_ACUITY_SCORE: 30
ADLS_ACUITY_SCORE: 28

## 2025-04-18 NOTE — PLAN OF CARE
Problem: Postpartum (Vaginal Delivery)  Goal: Optimal Pain Control and Function  Outcome: Progressing  Intervention: Prevent or Manage Pain  Recent Flowsheet Documentation  Taken 4/18/2025 6289 by Shahida Beatty RN  Perineal Care: absorbent brief/pad changed     Problem: Postpartum (Vaginal Delivery)  Goal: Effective Urinary Elimination  Outcome: Progressing   Goal Outcome Evaluation:      Plan of Care Reviewed With: patient    Overall Patient Progress: improvingOverall Patient Progress: improving    Outcome Evaluation: Pa is doing well.  Vital signs stable.  She is independ in her cares and baby cares. Denies any pain.  She is voiding well.  No stool since delivery, but passing flatus.  Will continue to monitor patient status and pain.

## 2025-04-18 NOTE — PLAN OF CARE
Goal Outcome Evaluation:      Plan of Care Reviewed With: patient    Overall Patient Progress: improvingOverall Patient Progress: improving         Problem: Adult Inpatient Plan of Care  Goal: Plan of Care Review  Description: The Plan of Care Review/Shift note should be completed every shift.  The Outcome Evaluation is a brief statement about your assessment that the patient is improving, declining, or no change.  This information will be displayed automatically on your shiftnote.  Outcome: Progressing  Flowsheets (Taken 2025 2156)  Plan of Care Reviewed With: patient  Overall Patient Progress: improving     Problem: Postpartum (Vaginal Delivery)  Goal: Successful Parent Role Transition  Outcome: Progressing  Goal: Hemostasis  Outcome: Progressing  Goal: Optimal Pain Control and Function  Outcome: Progressing  Intervention: Prevent or Manage Pain  Recent Flowsheet Documentation  Taken 2025 0217 by Nitin Rodríguez, RN  Perineal Care: absorbent brief/pad changed  Goal: Effective Urinary Elimination  Outcome: Progressing   Pt VSS. Lochia WDL. Pt has been up to BR and voided. Pt has been holding  throughout recovery period. Pt given IV toradol for inflammation and pain. Pt states that is adequate for her pain needs.

## 2025-04-18 NOTE — L&D DELIVERY NOTE
OB Vaginal Delivery Note    Artie Rapp MRN# [id number]   Age: 29 year old YOB: 1995       GA: 40w1d  GP:   Labor Complications:    EBL:   mL  Delivery QBL: 28 mL  Delivery Type: Vaginal, Spontaneous  ROM to Delivery Time: (Delivered) Minutes: 21  [place] Weight:     1 Minute 5 Minute 10 Minute   Apgar Totals: 8   9        IGNACIO POWERS;JOSE R CALHOUN;TYREL BERRIOS    Delivery Details:  Artie Rapp, a 29 year old  female delivered a viable infant with apgars of 8  and 9 . Patient was fully dilated and pushing after   hours   minutes in active labor. Delivery was via vaginal, spontaneous to a sterile field under none anesthesia. Infant delivered in vertex right occiput anterior position. Anterior and posterior shoulders delivered without difficulty. The cord was clamped, cut twice and 3 vessels were noted. Cord blood was obtained in routine fashion with the following disposition: discard.      Cord complications: none  Placenta delivered at 2025  2:30 AM. Placental disposition was Hospital disposal. Fundal massage performed and fundus found to be firm.     Episiotomy: none   Perineum, vagina, cervix were inspected, and the following lacerations were noted:   Perineal lacerations: none               Excellent hemostasis was noted. Needle count correct. Infant and patient in delivery room in good and stable condition.        Dionte, Female-Pa [[id number]]      Labor Event Times      Latent labor onset date/time: 2025    Active labor onset date: 25 Onset time: 12:08 AM          Labor Events     labor?: No   steroids: None  Labor Type: Spontaneous, AROM  Predominate monitoring during 1st stage: continuous electronic fetal monitoring     Rupture identifier: Sac 1  Rupture date/time: 25 0206   Rupture type: Artificial Rupture of Membranes  Fluid color: Clear  Fluid odor: Normal     Augmentation: AROM  Indications for augmentation: Ineffective Contraction  Pattern       Delivery/Placenta Date and Time      Delivery Date: 25 Delivery Time:  2:27 AM   Placenta Date/Time: 2025  2:30 AM  Oxytocin given at the time of delivery: after delivery of baby  Delivering clinician: Jose J Carvajal MD   Other personnel present at delivery:  Provider Role   Nitin Rodríguez RN Corbin, Brenna A, RN Garcia, Pamela, RN              Vaginal Counts       Initial count performed by 2 team members:  Two Team Members   davina rodríguez rn         Needles Suture Needles Sponges (RETIRED) Instruments   Initial counts   5    Added to count       Relief counts       Final counts   5            Placed during labor Accounted for at the end of labor   FSE NA NA   IUPC NA NA   Cervidil NA NA                  Final count performed by 2 team members:  Two Team Members   davina rodríguez rn      Final count correct?: Yes  Pre-Birth Team Brief: Complete  Post-Birth Team Debrief: Complete       Apgars    Living status: Living   1 Minute 5 Minute 10 Minute 15 Minute 20 Minute   Skin color: 0  1       Heart rate: 2  2       Reflex irritability: 2  2       Muscle tone: 2  2       Respiratory effort: 2  2       Total: 8  9       Apgars assigned by: GIO PEREZ       Cord      Vessels: 3 Vessels    Cord Complications: None               Cord Blood Disposition: Discard    Gases Sent?: No    Delayed cord clamping?: Yes    Cord Clamping Delay (seconds):  seconds    Stem cell collection?: No           [place] Resuscitation    Methods: None       Skin to Skin and Feeding Plan      Skin to skin initiation date/time: 1841    Skin to skin with: Mother  Skin to skin end date/time:            Labor Events and Shoulder Dystocia    Fetal Tracing Prior to Delivery: Category 1  Shoulder dystocia present?: Neg       Delivery (Maternal) (Provider to Complete) ([id number])    Episiotomy: None  Perineal lacerations: None    Repair suture: None  Genital tract inspection done: Pos       Blood Loss  Mother:  Artie Rapp #[id number]     Start of Mother's Information      Delivery Blood Loss   Intrapartum & Postpartum: 04/18/25 0008 - 04/18/25 0251    Delivery Admission: 04/17/25 2351 - 04/18/25 0251         Intrapartum & Postpartum Delivery Admission    Delivery QBL (mL) Hospital Encounter 28 mL 28 mL    Total  28 mL 28 mL               End of Mother's Information  Mother: Artie Rapp #[id number]                Delivery - Provider to Complete ([id number])    Delivering clinician: Jose J Carvajal MD  Delivery Type (Choose the 1 that will go to the Birth History): Vaginal, Spontaneous                         Other personnel:  Provider Role   Nitin Rodríguez, Manisha Mcgee RN Garcia, Pamela, RN                     Placenta    Date/Time: 4/18/2025  2:30 AM  Removal: Spontaneous  Disposition: Hospital disposal             Anesthesia    Method: None                    Presentation and Position    Presentation: Vertex    Position: Right Occiput Anterior                     Jose J Carvajal MD

## 2025-04-18 NOTE — H&P
[facility] Labor and Delivery History and Physical    Artie Rapp MRN# [id number]   Age: 29 year old YOB: 1995     Date of Admission:  2025    Primary care provider: Jose J Carvajal           Chief Complaint:   Artie Rapp is a 29 year old female who is 40w1d pregnant and being admitted for active labor management.  She reports some bloody discharge earlier today, but no loss of fluid. Later in the day she started having contractions, they became strong at 8 pm. She decided to be evaluated and presented to labor and delivery.   She was found to be 6 cm by RN, faustino every three to four minutes. I was called in for delivery due to grand multiparity.           Pregnancy history:     OBSTETRIC HISTORY:    OB History    Para Term  AB Living   8 5 5 0 2 5   SAB IAB Ectopic Multiple Live Births   2 0 0 0 5      # Outcome Date GA Lbr Markos/2nd Weight Sex Type Anes PTL Lv   8 Current            7 Term 23 38w2d 07:58 / 00:04 2.3 kg (5 lb 1.1 oz) F Vag-Spont None N CHRIS      Name: Pablo Reyes Melba      Apgar1: 8  Apgar5: 9   6 Term 22 38w5d 03:00 / 00:02 2.72 kg (5 lb 15.9 oz) F Vag-Spont None N CHRIS      Name: Destini Osuna Melba      Apgar1: 9  Apgar5: 9   5 SAB 22 10w6d    SAB         Birth Comments: Spontaneous miscarriage. Uncomplicated.   4 Term 21 39w2d 06:50 / 00:01 3.09 kg (6 lb 13 oz) F Vag-Spont None N CHRIS      Name: Rachelle Melba      Apgar1: 6  Apgar5: 9   3 SAB 20 11w6d    SAB         Birth Comments: missed SAB, needed D&C   2 Term 19 39w2d  2.801 kg (6 lb 2.8 oz) F Vag-Spont None N CHRIS      Name: Lance      Apgar1: 8  Apgar5: 9   1 Term 18 38w0d 02:25 / 00:05 2.28 kg (5 lb 0.4 oz) F Vag-Spont Local N CHRIS      Name: Yayoua      Apgar1: 8  Apgar5: 9       EDC: Estimated Date of Delivery: 25    Prenatal Labs:   Lab Results   Component Value Date    AS Negative 10/02/2024    HEPBANG Nonreactive 10/02/2024    GCPCRT  "Negative 08/25/2020    HGB 9.0 (L) 03/18/2025       GBS Status:   No results found for: \"GBS\"    Active Problem List  Patient Active Problem List   Diagnosis    Asymmetrical Sensorineural Hearing Loss    Scoliosis    Pregnant    Iron deficiency anemia secondary to inadequate dietary iron intake    Supervision of other normal pregnancy, antepartum    Grand multiparity       Medication Prior to Admission  Medications Prior to Admission   Medication Sig Dispense Refill Last Dose/Taking    Prenatal Vit-Fe Fumarate-FA (PRENATAL MULTIVITAMIN W/IRON) 27-0.8 MG tablet Take 1 tablet by mouth daily. 90 tablet 3 4/17/2025 Evening    ferrous gluconate (FERGON) 324 (38 Fe) MG tablet Take 1 tablet (324 mg) by mouth daily (with breakfast). 90 tablet 0     senna-docusate (SENOKOT-S/PERICOLACE) 8.6-50 MG tablet Take 1 tablet by mouth daily as needed for constipation 50 tablet 1    .        Maternal Past Medical History:     Past Medical History:   Diagnosis Date    Anemia     borderline    Delayed delivery after SROM (spontaneous rupture of membranes) 12/27/2022    Normal vaginal delivery 11/30/2019    Delivered on the toilet.  Coupling noted.  arom of clear fluid.  No meds.    Received cytotec and pitocin for bleeding.         Vaginal delivery 11/12/2018    Unmedicated.  srom of clear fluid.  Delivered quickly after this.  Left labial laceration repaired.                         Family History:   The family history includes Autism Spectrum Disorder in her nephew; Multiple births in her mother; No Known Problems in her brother, brother, brother, father, sister, sister, and sister.    Family history   reviewed and updated in [facility]            Social History:     Social History     Tobacco Use    Smoking status: Never     Passive exposure: Never    Smokeless tobacco: Never    Tobacco comments:     no passive exposure   Substance Use Topics    Alcohol use: No            Review of Systems:   The Review of Systems is negative other than " noted in the HPI          Physical Exam:   Vitals were reviewed  Temp: 98.9  F (37.2  C) Temp src: Oral BP: 111/68 Pulse: 86   Resp: 18 SpO2: 97 % O2 Device: None (Room air)    Constitutional:   awake, alert, cooperative, no apparent distress, and appears stated age     Lungs:   No increased work of breathing, good air exchange, clear to auscultation bilaterally, no crackles or wheezing     Cardiovascular:   Normal apical impulse, regular rate and rhythm, normal S1 and S2, no S3 or S4, and no murmur noted     Abdomen:   No scars, normal bowel sounds, soft, non-distended, non-tender, no masses palpated, no hepatosplenomegally     Genitounirinary:   External Genitalia:  General appearance; normal      Cervix:   Membranes: intact   Dilation: 6   Effacement: 70%   Station:-1   Consistency: soft   Position: Posterior  Presentation:Cephalic  Fetal Heart Rate Tracing: reactive and reassuring  Tocometer: frequency q 5 minutes                       Assessment:   Artie Rapp is a 40w1d pregnant female admitted with active labor management.          Plan:   Admit - see IP orders  Well appearing, breathing through contractions.   Pain medication as desired.   Check hemoglobin s/p iron infusions.  GBS negative.   Bottle feeding.   Anticipate     Jose J Carvajal MD

## 2025-04-18 NOTE — PROGRESS NOTES
Data: Artie Rapp transferred to Rm 22 via wheelchair at 0435. Baby transferred via parent's arms.  Action: Receiving unit notified of transfer. Patient and family notified of room change. Report given to Ana Maria ALATORRE at 0435. Belongings sent to receiving unit. Accompanied by Registered Nurse. Oriented patient to surroundings. Call light within reach. ID bands double-checked with receiving RN.  Response: Patient tolerated transfer and is stable.

## 2025-04-18 NOTE — PROGRESS NOTES
Data: Patient presented to [facility]: 2025 11:51 PM.  Reason for maternal/fetal assessment is uterine contractions. Patient reports cxt q 5 min. Patient denies leaking of vaginal fluid/rupture of membranes, vaginal bleeding, abdominal pain, pelvic pressure, nausea, vomiting, headache, visual disturbances, epigastric or RUQ pain, significant edema. Patient reports fetal movement is normal. Patient is a 40w1d .  Prenatal record reviewed. Pregnancy has been uncomplicated.    Vital signs wnl. Support person is present.     Action: Verbal consent for EFM. Triage assessment completed.     Response: Patient verbalized agreement with plan. Contacted Wei via phone of pt admission/current status and Wei DOAN will come to bs.    [General Appearance - Alert] : alert [General Appearance - In No Acute Distress] : in no acute distress [General Appearance - Well Nourished] : well nourished [General Appearance - Well Developed] : well developed [General Appearance - Well-Appearing] : healthy appearing [Sclera] : the sclera and conjunctiva were normal [PERRL With Normal Accommodation] : pupils were equal in size, round, and reactive to light [Extraocular Movements] : extraocular movements were intact [Outer Ear] : the ears and nose were normal in appearance [Hearing Threshold Finger Rub Not [place]] : hearing was normal [Examination Of The Oral Cavity] : the lips and gums were normal [Neck Appearance] : the appearance of the neck was normal [Auscultation Breath Sounds / Voice Sounds] : lungs were clear to auscultation bilaterally [Heart Rate And Rhythm] : heart rate was normal and rhythm regular [Heart Sounds] : normal S1 and S2 [Heart Sounds Gallop] : no gallops [Murmurs] : no murmurs [Heart Sounds Pericardial Friction Rub] : no pericardial rub [Bowel Sounds] : normal bowel sounds [Abdomen Soft] : soft [Abdomen Tenderness] : non-tender [Abdomen Mass (___ Cm)] : no abdominal mass palpated [Skin Color & Pigmentation] : normal skin color and pigmentation [Skin Turgor] : normal skin turgor [] : no rash [Motor Exam] : the motor exam was normal [No Focal Deficits] : no focal deficits [Oriented To Time, Place, And Person] : oriented to person, place, and time [Impaired Insight] : insight and judgment were intact [Affect] : the affect was normal

## 2025-04-19 VITALS
BODY MASS INDEX: 26.04 KG/M2 | RESPIRATION RATE: 18 BRPM | SYSTOLIC BLOOD PRESSURE: 91 MMHG | HEART RATE: 58 BPM | DIASTOLIC BLOOD PRESSURE: 48 MMHG | WEIGHT: 128.3 LBS | TEMPERATURE: 98.1 F | OXYGEN SATURATION: 97 %

## 2025-04-19 PROBLEM — Z34.90 PREGNANCY: Status: RESOLVED | Noted: 2025-04-18 | Resolved: 2025-04-19

## 2025-04-19 PROCEDURE — 250N000013 HC RX MED GY IP 250 OP 250 PS 637: Performed by: FAMILY MEDICINE

## 2025-04-19 PROCEDURE — 250N000011 HC RX IP 250 OP 636: Performed by: FAMILY MEDICINE

## 2025-04-19 PROCEDURE — 90707 MMR VACCINE SC: CPT | Performed by: FAMILY MEDICINE

## 2025-04-19 PROCEDURE — 90471 IMMUNIZATION ADMIN: CPT | Performed by: FAMILY MEDICINE

## 2025-04-19 PROCEDURE — 99207 PR SUBSEQUENT HOSPITAL CARE FOR MOTHER, 15 MINUTES: CPT | Performed by: FAMILY MEDICINE

## 2025-04-19 RX ORDER — IBUPROFEN 800 MG/1
800 TABLET, FILM COATED ORAL EVERY 8 HOURS PRN
Qty: 30 TABLET | Refills: 0 | Status: SHIPPED | OUTPATIENT
Start: 2025-04-19

## 2025-04-19 RX ORDER — AMOXICILLIN 250 MG
1 CAPSULE ORAL 2 TIMES DAILY PRN
Qty: 30 TABLET | Refills: 0 | Status: SHIPPED | OUTPATIENT
Start: 2025-04-19

## 2025-04-19 RX ADMIN — FERROUS GLUCONATE 324 MG: 324 TABLET ORAL at 08:41

## 2025-04-19 RX ADMIN — MEASLES, MUMPS, AND RUBELLA VIRUS VACCINE LIVE 0.5 ML: 1000; 12500; 1000 INJECTION, POWDER, LYOPHILIZED, FOR SUSPENSION SUBCUTANEOUS at 10:48

## 2025-04-19 ASSESSMENT — ACTIVITIES OF DAILY LIVING (ADL)
ADLS_ACUITY_SCORE: 30

## 2025-04-19 NOTE — DISCHARGE SUMMARY
Vaginal Delivery Postpartum Day 1/Discharge Summary    Patient Name:  Artie Rapp  :      1995  MRN:      [id number]    Admission Date:  2025  Delivery Date:  2025  Gestational Age at Delivery:  40w1d  Discharge Date:  2025    Subjective:  Patient has no concerns.  Lochia is decreasing.  Pain is well controlled.  Eating and ambulating well.  Normal urine output.   The baby is well and being fed by bottle.    Discharge Exam:    Patient Vitals for the past 24 hrs:   BP Temp Temp src Pulse Resp SpO2 Weight   25 0826 91/48 98.1  F (36.7  C) Oral 58 18 97 % --   25 0624 -- -- -- -- -- -- 58.2 kg (128 lb 4.8 oz)   25 2330 96/51 97.5  F (36.4  C) Oral 57 18 98 % --   25 109/57 98.2  F (36.8  C) Oral -- 18 98 % --   25 1554 97/51 98.3  F (36.8  C) Oral -- 16 98 % --   25 0845 94/50 98.3  F (36.8  C) Oral 77 16 96 % --      GEN: alert, not distressed  CHEST: clear  COR: regular without murmur  ABD: fundus firm at -1 / u  EXT:  No edema or calf tenderness    Principal Diagnosis:  Well ppd #1, s/p NVD    Patient Active Problem List   Diagnosis    Asymmetrical Sensorineural Hearing Loss    Scoliosis    Normal vaginal delivery    Pregnant    Iron deficiency anemia secondary to inadequate dietary iron intake    Supervision of other normal pregnancy, antepartum    Grand multiparity       Procedure(s) Performed:    Indication for Induction:  n/a    Plan:  Discharge to home.  Follow up with Dr. Carvajal in 2 weeks and in 4-6 weeks  Patient Instructions:      Physical activity: at tolerated    Diet:  As tolerated    Discharge Medications:      Review of your medicines        START taking        Dose / Directions   ibuprofen 800 MG tablet  Commonly known as: ADVIL/MOTRIN  Used for:  (normal spontaneous vaginal delivery)      Dose: 800 mg  Take 1 tablet (800 mg) by mouth every 8 hours as needed for other (first line or per patient preference for mild to moderate pain  management).  Quantity: 30 tablet  Refills: 0            CONTINUE these medicines which may have CHANGED, or have new prescriptions. If we are uncertain of the size of tablets/capsules you have at home, strength may be listed as something that might have changed.        Dose / Directions   senna-docusate 8.6-50 MG tablet  Commonly known as: SENOKOT-S/PERICOLACE  This may have changed: when to take this  Used for:  (normal spontaneous vaginal delivery)      Dose: 1 tablet  Take 1 tablet by mouth 2 times daily as needed for constipation.  Quantity: 30 tablet  Refills: 0            CONTINUE these medicines which have NOT CHANGED        Dose / Directions   ferrous gluconate 324 (38 Fe) MG tablet  Commonly known as: FERGON  Used for: Encounter for supervision of normal pregnancy, antepartum, unspecified , Iron deficiency anemia, unspecified iron deficiency anemia type      Dose: 324 mg  Take 1 tablet (324 mg) by mouth daily (with breakfast).  Quantity: 90 tablet  Refills: 0     prenatal multivitamin w/iron 27-0.8 MG tablet  Used for: Supervision of normal pregnancy      Dose: 1 tablet  Take 1 tablet by mouth daily.  Quantity: 90 tablet  Refills: 3               Where to get your medicines        These medications were sent to [facility] [address] [address]      Phone: [phone]   ibuprofen 800 MG tablet  senna-docusate 8.6-50 MG tablet

## 2025-04-19 NOTE — PLAN OF CARE
Goal Outcome Evaluation:      Plan of Care Reviewed With: patient    Overall Patient Progress: improving    Outcome Evaluation: VSS. Pt met expectations for discharge.    VSS. Fundus firm. Pt denied pain throughout the shift. Pt is formula feeding at this time per pt preference. Breast pump sold to pt per pt request. Ambulating independently in the room. MMR administered, VIS given. Discharge medications given to pt.     D:  Patient desires discharge home.  Discharge orders received and entered by provider.  A:  Discharge instructions reviewed with the patient.  All questions and concerns addressed.  R:  Discharge criteria met.  4 Part ID bands double checked.   discharged in car seat with parents.  The nursing assistant escorted patient to hospital lobby at 1355.

## 2025-04-19 NOTE — PLAN OF CARE
Problem: Adult Inpatient Plan of Care  Goal: Plan of Care Review  Description: The Plan of Care Review/Shift note should be completed every shift.  The Outcome Evaluation is a brief statement about your assessment that the patient is improving, declining, or no change.  This information will be displayed automatically on your shiftnote.  Outcome: Progressing  Flowsheets (Taken 4/19/2025 0053)  Plan of Care Reviewed With:   patient   significant other   Goal Outcome Evaluation:      Plan of Care Reviewed With: patient, significant other        Artie Rapp 's vitals and assessment are within normal limit. She is independent with self and baby cares.She denies pain. Prn tylenol and motrin available . Significant other at bedside assisting with cares.Meeting discharge goals. Adeline Mckeon RN

## 2025-04-19 NOTE — PLAN OF CARE
Goal Outcome Evaluation: Progressing       Plan of Care Reviewed With: patient    Overall Patient Progress: improvingOverall Patient Progress: improving    Outcome Evaluation: Pa's VSS.  Postpartum assessments WNL.  She denies pain and declined pain meds.  She's independent with self and baby cares.  She is formula feeding only.  She's a loving, attentive caregiver for baby.    Problem: Postpartum (Vaginal Delivery)  Goal: Successful Parent Role Transition  Intervention: Support Parent Role Transition  Recent Flowsheet Documentation  Taken 4/18/2025 1554 by Cassidy Benz RN  Supportive Measures:   active listening utilized   counseling provided   decision-making supported   positive reinforcement provided   self-care encouraged  Parent-Child Attachment Promotion:   caring behavior modeled   cue recognition promoted   interaction encouraged   participation in care promoted   positive reinforcement provided   rooming-in promoted     Problem: Postpartum (Vaginal Delivery)  Goal: Hemostasis  Outcome: Progressing     Problem: Postpartum (Vaginal Delivery)  Goal: Effective Urinary Elimination  Outcome: Met

## 2025-04-19 NOTE — DISCHARGE INSTRUCTIONS
Warning Signs after Having a Baby    Keep this paper on your fridge or somewhere else where you can see it.    Call your provider if you have any of these symptoms up to 12 weeks after having your baby.    Thoughts of hurting yourself or your baby  Pain in your chest or trouble breathing  Severe headache not helped by pain medicine  Eyesight concerns (blurry vision, seeing spots or flashes of light, other changes to eyesight)  Fainting, shaking or other signs of a seizure    Call 9-1-1 if you feel that it is an emergency.     The symptoms below can happen to anyone after giving birth. They can be very serious. Call your provider if you have any of these warning signs.    My provider s phone number: _______________________    Losing too much blood (hemorrhage)    Call your provider if you soak through a pad in less than an hour or pass blood clots bigger than a golf ball. These may be signs that you are bleeding too much.    Blood clots in the legs or lungs    After you give birth, your body naturally clots its blood to help prevent blood loss. Sometimes this increased clotting can happen in other areas of the body, like the legs or lungs. This can block your blood flow and be very dangerous.     Call your provider if you:  Have a red, swollen spot on the back of your leg that is warm or painful when you touch it.   Are coughing up blood.     Infection    Call your provider if you have any of these symptoms:  Fever of 100.4 F (38 C) or higher.  Pain or redness around your stitches if you had an incision.   Any yellow, white, or green fluid coming from places where you had stitches or surgery.    Mood Problems (postpartum depression)    Many people feel sad or have mood changes after having a baby. But for some people, these mood swings are worse.     Call your provider right away if you feel so anxious or nervous that you can't care for yourself or your baby.    Preeclampsia (high blood pressure)    Even if you  didn't have high blood pressure when you were pregnant, you are at risk for the high blood pressure disease called preeclampsia. This risk can last up to 12 weeks after giving birth.     Call your provider if you have:   Pain on your right side under your rib cage  Sudden swelling in the hands and face    Remember: You know your body. If something doesn't feel right, get medical help.     For informational purposes only. Not to replace the advice of your health care provider. Copyright 2020 [facility]. All rights reserved. Clinically reviewed by Pearl Valdez, RNC-OB, MSN. [id number] [id number] - Rev 02/23.

## 2025-04-23 ENCOUNTER — MYC MEDICAL ADVICE (OUTPATIENT)
Dept: FAMILY MEDICINE | Facility: CLINIC | Age: 30
End: 2025-04-23
Payer: COMMERCIAL

## 2025-06-24 ENCOUNTER — MEDICAL CORRESPONDENCE (OUTPATIENT)
Dept: HEALTH INFORMATION MANAGEMENT | Facility: CLINIC | Age: 30
End: 2025-06-24
Payer: COMMERCIAL

## 2025-07-07 ENCOUNTER — TELEPHONE (OUTPATIENT)
Dept: FAMILY MEDICINE | Facility: CLINIC | Age: 30
End: 2025-07-07
Payer: COMMERCIAL

## 2025-07-07 NOTE — TELEPHONE ENCOUNTER
Reason for Call:  Reschedule Appointment    Patient requesting this type of appt: OB Intake    Requested provider: RN Visit    When does patient want to be seen/preferred time: 7/9/25 at 1500    Oliver ANDERSON RN  [facility]

## 2025-07-08 LAB
ABO + RH BLD: NORMAL
BLD GP AB SCN SERPL QL: NEGATIVE
SPECIMEN EXP DATE BLD: NORMAL

## 2025-07-09 ENCOUNTER — TELEPHONE (OUTPATIENT)
Dept: FAMILY MEDICINE | Facility: CLINIC | Age: 30
End: 2025-07-09

## 2025-07-09 ENCOUNTER — PRENATAL OFFICE VISIT (OUTPATIENT)
Dept: FAMILY MEDICINE | Facility: CLINIC | Age: 30
End: 2025-07-09
Payer: COMMERCIAL

## 2025-07-09 VITALS
WEIGHT: 124.25 LBS | HEART RATE: 85 BPM | DIASTOLIC BLOOD PRESSURE: 66 MMHG | RESPIRATION RATE: 20 BRPM | SYSTOLIC BLOOD PRESSURE: 101 MMHG | BODY MASS INDEX: 25.05 KG/M2 | HEIGHT: 59 IN | OXYGEN SATURATION: 98 %

## 2025-07-09 DIAGNOSIS — Z34.80 PRENATAL CARE, SUBSEQUENT PREGNANCY, UNSPECIFIED TRIMESTER: Primary | ICD-10-CM

## 2025-07-09 DIAGNOSIS — Z3A.09 9 WEEKS GESTATION OF PREGNANCY: ICD-10-CM

## 2025-07-09 LAB
ALBUMIN UR-MCNC: ABNORMAL MG/DL
APPEARANCE UR: ABNORMAL
BACTERIA #/AREA URNS HPF: ABNORMAL /HPF
BILIRUB UR QL STRIP: NEGATIVE
COLOR UR AUTO: YELLOW
ERYTHROCYTE [DISTWIDTH] IN BLOOD BY AUTOMATED COUNT: 12.9 % (ref 10–15)
EST. AVERAGE GLUCOSE BLD GHB EST-MCNC: 94 MG/DL
GLUCOSE UR STRIP-MCNC: NEGATIVE MG/DL
HBA1C MFR BLD: 4.9 % (ref 0–5.6)
HCT VFR BLD AUTO: 33.9 % (ref 35–47)
HGB BLD-MCNC: 11.9 G/DL (ref 11.7–15.7)
HGB UR QL STRIP: ABNORMAL
KETONES UR STRIP-MCNC: 15 MG/DL
LEUKOCYTE ESTERASE UR QL STRIP: NEGATIVE
MCH RBC QN AUTO: 32.2 PG (ref 26.5–33)
MCHC RBC AUTO-ENTMCNC: 35.1 G/DL (ref 31.5–36.5)
MCV RBC AUTO: 92 FL (ref 78–100)
MUCOUS THREADS #/AREA URNS LPF: PRESENT /LPF
NITRATE UR QL: NEGATIVE
PH UR STRIP: 6 [PH] (ref 5–7)
PLATELET # BLD AUTO: 268 10E3/UL (ref 150–450)
RBC # BLD AUTO: 3.69 10E6/UL (ref 3.8–5.2)
RBC #/AREA URNS AUTO: ABNORMAL /HPF
SP GR UR STRIP: 1.02 (ref 1–1.03)
SQUAMOUS #/AREA URNS AUTO: ABNORMAL /LPF
UROBILINOGEN UR STRIP-ACNC: 1 E.U./DL
WBC # BLD AUTO: 9 10E3/UL (ref 4–11)
WBC #/AREA URNS AUTO: ABNORMAL /HPF

## 2025-07-09 PROCEDURE — 3078F DIAST BP <80 MM HG: CPT

## 2025-07-09 PROCEDURE — 87389 HIV-1 AG W/HIV-1&-2 AB AG IA: CPT

## 2025-07-09 PROCEDURE — 3074F SYST BP LT 130 MM HG: CPT

## 2025-07-09 PROCEDURE — 87491 CHLMYD TRACH DNA AMP PROBE: CPT

## 2025-07-09 PROCEDURE — 87340 HEPATITIS B SURFACE AG IA: CPT

## 2025-07-09 PROCEDURE — 3044F HG A1C LEVEL LT 7.0%: CPT

## 2025-07-09 PROCEDURE — 99207 PR NO CHARGE NURSE ONLY: CPT

## 2025-07-09 PROCEDURE — 81001 URINALYSIS AUTO W/SCOPE: CPT

## 2025-07-09 PROCEDURE — 83655 ASSAY OF LEAD: CPT | Mod: 90

## 2025-07-09 PROCEDURE — 99000 SPECIMEN HANDLING OFFICE-LAB: CPT

## 2025-07-09 PROCEDURE — 86900 BLOOD TYPING SEROLOGIC ABO: CPT

## 2025-07-09 PROCEDURE — 36415 COLL VENOUS BLD VENIPUNCTURE: CPT

## 2025-07-09 PROCEDURE — 87591 N.GONORRHOEAE DNA AMP PROB: CPT

## 2025-07-09 PROCEDURE — 86901 BLOOD TYPING SEROLOGIC RH(D): CPT

## 2025-07-09 PROCEDURE — 83036 HEMOGLOBIN GLYCOSYLATED A1C: CPT

## 2025-07-09 PROCEDURE — 86780 TREPONEMA PALLIDUM: CPT

## 2025-07-09 PROCEDURE — 86803 HEPATITIS C AB TEST: CPT

## 2025-07-09 PROCEDURE — 86850 RBC ANTIBODY SCREEN: CPT

## 2025-07-09 PROCEDURE — 85027 COMPLETE CBC AUTOMATED: CPT

## 2025-07-09 RX ORDER — PNV NO.95/FERROUS FUM/FOLIC AC 28MG-0.8MG
1 TABLET ORAL DAILY
Qty: 100 CAPSULE | Refills: 3 | Status: SHIPPED | OUTPATIENT
Start: 2025-07-09

## 2025-07-09 NOTE — TELEPHONE ENCOUNTER
It appeared patient is assigned to the [facility] clinic. RN made attempt to reach patient to determine if she is coming to the [place] to receive OB care or remain at the [facility].  Clinic number left request a return call.    Jemal Lang RN   [facility]

## 2025-07-09 NOTE — PROGRESS NOTES
SUBJECTIVE:     HPI:    This is a 29 year old female patient,  who presents for her first RN obstetrical visit.   was not required.     JUAN R: 2025, by known Last Menstrual Period on 3/1/25.  She is 18w4d weeks.  Her cycles are regular.  Her last menstrual period was normal.   Since her LMP, she experienced periodically vaginal discharged of white and non-odor mucus).   She denies nausea, emesis, abdominal pain, fatigue, headache, vaginal discharge, dysuria, pelvic pain, urinary urgency, lightheadedness, urinary frequency, vaginal bleeding, and constipation.    Additional History: , +1 girl delivered  without complications at term. Patient has hx of spontaneous  at 6 weeks in . Patient transferred care from the [facility] to [place] due to her primary care leaving her practice from this location.  Patient confirmed this will be a permanent switch. Patient is scheduled with Dr. Colmenares to oversee current pregnancy care management. Observed pregnancy protrusion from patient who reported active fetal movement.     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  Living in Household: Spouse and Children    Past History:  Her past medical history   Past Medical History:   Diagnosis Date    Gonorrhea     Urinary tract infection    .      She has a history of  spontaneous  at approximately 6 weeks gestational age    Since her last LMP she denies use of alcohol, tobacco and street drugs.    Past medical, surgical, social and family history were reviewed and updated in [facility].     Current Outpatient Medications   Medication Sig Dispense Refill    docusate sodium (COLACE) 100 MG capsule Take 1 capsule (100 mg) by mouth 2 times daily as needed for constipation  "(Patient not taking: Reported on 2025) 60 capsule 0    ferrous gluconate (FERGON) 324 (38 Fe) MG tablet Take 1 tablet (324 mg) by mouth daily (with breakfast) (Patient not taking: Reported on 2025) 60 tablet 0    ferrous gluconate (FERGON) 324 (38 Fe) MG tablet Take 324 mg by mouth daily (with breakfast) (Patient not taking: Reported on 2025)      Prenatal MV-Min-Fe Fum-FA-DHA (PRENATAL MULTIVITAMIN PLUS DHA) 27-0.8-250 MG CAPS Take 1 tablet by mouth daily OK to substitute formulary equivalent (Patient not taking: Reported on 2023) 100 capsule 3     No current facility-administered medications for this visit.      OBJECTIVE:     EXAM:  /66   Pulse 85   Resp 20   Ht 1.511 m (4' 11.49\")   Wt 56.4 kg (124 lb 4 oz)   LMP 2025 (Exact Date)   SpO2 98%   Breastfeeding Unknown   BMI 24.69 kg/m   Body mass index is 24.69 kg/m .    - Prepregnancy weight: 120 lb  - Observed pregnancy protrusion with positive fetal movement reported from patient.        ASSESSMENT/PLAN:       ICD-10-CM    1. Prenatal care, subsequent pregnancy, unspecified trimester  Z34.80 ABO/Rh type and screen     Hepatitis B surface antigen     CBC with platelets     HIV Antigen Antibody Combo     Rubella Antibody IgG     Treponema Abs w Reflex to RPR and Titer     Urine Culture Aerobic Bacterial     *UA reflex to Microscopic     Lead, Venous Blood     US OB >= 14 Weeks     Chlamydia trachomatis/Neisseria gonorrhoeae by PCR - Clinic Collect     Hepatitis C Screen Reflex to HCV RNA Quant and Genotype     Hemoglobin A1c     Urine Culture Aerobic Bacterial     *UA reflex to Microscopic     ABO/Rh type and screen     Hepatitis B surface antigen     CBC with platelets     HIV Antigen Antibody Combo     Rubella Antibody IgG     Treponema Abs w Reflex to RPR and Titer     Lead, Venous Blood     Hepatitis C Screen Reflex to HCV RNA Quant and Genotype     Hemoglobin A1c     Urine Microscopic Exam          29 year old , " 18w4d weeks of pregnancy with JUAN R of 12/6/2025, by Last known Menstrual Period on 3/1/2025.     Discussed as follows:      - Follow up in one weeks for IOB visit with Dr. Colmenares. Return visit thereafter is require  - Labs and ultrasound ordered with scheduling and result pending   - MYRIAD testing discussed, but patient declined.        PLAN/PATIENT INSTRUCTIONS:     Medications- Prenatal Vitamin, recommend one with DHA as this helps with development of eyes and brain, no specific brand recommendation   Water Intake-  ounces daily   Weight- monitored at each appointment   Common Symptoms- may experience shortness of breath, increased heart rate, nausea/vomiting, headaches, cramping, spotting, etc.  If symptoms not improved and or worsening, contact provider.        - Only take Tylenol (acetaminophen) for headaches/pain concerns   - Review remedies & OTC medication to help with common symptoms in pregnancy (see taking medication during pregnancy handout)     [place]- baby is protected, not uncommon to have light cramping or spotting, reach out if persisting or worsening    Diet   - Wash fruits and vegetables before eating raw or cooking   - Avoid unpasteurized products   - Avoid undercooked meat, poultry, fish (no raw fish) and eggs    - Reheat hot dogs and luncheon meats/cold cuts prior to consumption     Caffeine- limit to 200 mg/day (about 1.5 cups of coffee)   Abstain from smoking, alcohol, and drugs      Travel     - No travel 4-6 weeks out from due date   - Planes/lengthy drives- get up and walk every 1-2 hours for circulation, increased risk for DVT during pregnancy   - Seat belts- wear underneath belly not across it   - Air bags- back up seat      Disease and Infection     Risk of toxoplasmosis with cats  feces, have someone else change litter box during pregnancy, wear gloves when working outside and wash hands thoroughly   Avoid traveling to locations high risk for zika, use insect repellent,  wear long sleeves and pants   Flu vaccine during flu season, COVID vaccine and TDAP   TDAP recommended with each pregnancy, make sure partner is up to date as well (usually only once every 10 years)      Frequency of Appointments- unless advised otherwise   Every 4 weeks until 28 weeks   Every 2 weeks until 36 weeks   Weekly until delivery         Prenatal OB Questionnaire  Patient supplied answers from flow sheet for:  Prenatal OB Questionnaire.  Past Medical History  Have you ever recieved care for your mental health? : (Patient-Rptd) No  Have you ever been in a major accident or suffered serious trauma?: (Patient-Rptd) No  Within the last year, has anyone hit, slapped, kicked or otherwise hurt you?: (Patient-Rptd) No  In the last year, has anyone forced you to have sex when you didn't want to?: (Patient-Rptd) No    Past Medical History 2   Have you ever received a blood transfusion?: (Patient-Rptd) No  Would you accept a blood transfusion if was medically recommended?: (Patient-Rptd) Yes  Does anyone in your home smoke?: (Patient-Rptd) No   Is your blood type Rh negative?: (Patient-Rptd) Unknown  Have you ever ?: (Patient-Rptd) No  Have you been hospitalized for a nonsurgical reason excluding normal delivery?: (Patient-Rptd) No  Have you ever had an abnormal pap smear?: (Patient-Rptd) No    Past Medical History (Continued)  Do you have a history of abnormalities of the uterus?: (Patient-Rptd) No  Did your mother take DIEGO or any other hormones when she was pregnant with you?: (Patient-Rptd) Unknown  Do you have any other problems we have not asked about which you feel may be important to this pregnancy?: (Patient-Rptd) No       Allergies as of 7/9/2025:    Allergies as of 07/09/2025    (No Known Allergies)       Current medications are:  Current Outpatient Medications   Medication Sig Dispense Refill    docusate sodium (COLACE) 100 MG capsule Take 1 capsule (100 mg) by mouth 2 times daily as needed for  constipation (Patient not taking: Reported on 7/9/2025) 60 capsule 0    ferrous gluconate (FERGON) 324 (38 Fe) MG tablet Take 1 tablet (324 mg) by mouth daily (with breakfast) (Patient not taking: Reported on 7/9/2025) 60 tablet 0    ferrous gluconate (FERGON) 324 (38 Fe) MG tablet Take 324 mg by mouth daily (with breakfast) (Patient not taking: Reported on 7/9/2025)      Prenatal MV-Min-Fe Fum-FA-DHA (PRENATAL MULTIVITAMIN PLUS DHA) 27-0.8-250 MG CAPS Take 1 tablet by mouth daily OK to substitute formulary equivalent (Patient not taking: Reported on 12/6/2023) 100 capsule 3       Early ultrasound screening tool:    Does patient have irregular periods?  No  Did patient use hormonal birth control in the three months prior to positive urine pregnancy test? No  Is the patient breastfeeding?  No  Is the patient 10 weeks or greater at time of education visit?  Yes

## 2025-07-10 LAB
C TRACH DNA SPEC QL PROBE+SIG AMP: NEGATIVE
HBV SURFACE AG SERPL QL IA: NONREACTIVE
HCV AB SERPL QL IA: NONREACTIVE
HIV 1+2 AB+HIV1 P24 AG SERPL QL IA: NONREACTIVE
N GONORRHOEA DNA SPEC QL NAA+PROBE: NEGATIVE
SPECIMEN TYPE: NORMAL
T PALLIDUM AB SER QL: NONREACTIVE

## 2025-07-13 ENCOUNTER — HEALTH MAINTENANCE LETTER (OUTPATIENT)
Age: 30
End: 2025-07-13

## 2025-07-15 ENCOUNTER — PRENATAL OFFICE VISIT (OUTPATIENT)
Dept: FAMILY MEDICINE | Facility: CLINIC | Age: 30
End: 2025-07-15
Payer: COMMERCIAL

## 2025-07-15 VITALS
RESPIRATION RATE: 20 BRPM | DIASTOLIC BLOOD PRESSURE: 62 MMHG | WEIGHT: 125.75 LBS | OXYGEN SATURATION: 98 % | HEART RATE: 84 BPM | BODY MASS INDEX: 25.35 KG/M2 | HEIGHT: 59 IN | SYSTOLIC BLOOD PRESSURE: 98 MMHG | TEMPERATURE: 98.2 F

## 2025-07-15 DIAGNOSIS — Z12.4 CERVICAL CANCER SCREENING: Primary | ICD-10-CM

## 2025-07-15 PROBLEM — Z34.00 SUPERVISION OF NORMAL FIRST PREGNANCY, ANTEPARTUM: Status: RESOLVED | Noted: 2023-05-10 | Resolved: 2025-07-15

## 2025-07-15 PROBLEM — Z3A.09 9 WEEKS GESTATION OF PREGNANCY: Status: RESOLVED | Noted: 2023-03-30 | Resolved: 2025-07-15

## 2025-07-15 PROBLEM — Z36.89 ENCOUNTER FOR TRIAGE IN PREGNANT PATIENT: Status: RESOLVED | Noted: 2023-08-15 | Resolved: 2025-07-15

## 2025-07-15 PROBLEM — R31.21 ASYMPTOMATIC MICROSCOPIC HEMATURIA: Status: RESOLVED | Noted: 2019-08-09 | Resolved: 2025-07-15

## 2025-07-16 NOTE — PATIENT INSTRUCTIONS
"HEALTHY PREGNANCY CARE: 18-22 WEEKS PREGNANT    Your baby is continuing to develop quickly. At this stage, babies can now suck their thumbs,  firmly with their hands, and are beginning to hear.    Sometime between 18 and 22 weeks, you will start to feel your baby move. At first, these small fetal movements feel like fluttering or \"butterflies.\" Some women say that they feel like gas bubbles. As the baby grows, these movements will become stronger and be able to be felt through your abdomen.     Nutrition: During this time, you may find that your nausea and fatigue are gone. Overall, you may feel better and have more energy than you did in your first trimester. Be sure you are getting enough calcium and iron in your diet. Your prenatal vitamins cannot supply all of the nutrients you need, so continue to eat 3-4 servings of dairy foods and 2-3 servings of meat/fish/poultry/nuts every day. Foods high in iron include: red meats, eggs, dark green vegetables, dark yellow vegetables, nuts, kidney beans and chickpeas. Some cereals are fortified with iron, so look at the food labels for 100% of the daily requirement for iron.     [place] for childbirth and parenting classes, including an infant CPR class. Breastfeeding classes are recommended too.  [employer] has classes online and in [facility] and [place]:  https://[employer]/  [phone].    Plan for the gestational diabetes screening between weeks 24-28. You can eat normally before that visit; we would suggest making sure you have protein foods, but not a lot of carbohydrates or sugary foods.    Your blood type was determined at your first OB appointment. If you are Rh negative, you should discuss the need for a Rhogam shot with your midwife or physician.     If you had a  birth in the past, discuss a trial of labor with your midwife or physician. He or she may ask that you obtain your operative report from that  if " you are wanting to have a vaginal birth after  () this time.     Think about the support you have, and what help you can plan on from family and friends, after your baby is born. Many mothers and babies are ready to go home from the hospital within a few days. Your clinic staff is available to assist you and the [facility] staff is available to you after hours. Start preparing your other children for changes they'll experience with the new baby. Explore day care options.    You may find that you have new discomforts now, such as sleep problems or leg cramps.       Watch for the warning signs of premature labor:   Dull, low backache  Contractions of the uterus, menstrual-like cramps  Abdominal cramping with or without diarrhea  More pelvic pressure  Increase or change in vaginal discharge.     Remember that labor doesn't have to hurt. Never hesitate to call your midwife or physician or their staff for any one of these warning signs - or if something just doesn't feel right.

## 2025-07-16 NOTE — PROGRESS NOTES
PRENATAL VISIT:  INITIAL OB    Assessment /Plan     Pa Jazz Perkins is a 29 year old  at 19w4d with an EDC of 2025, by Last Menstrual Period, here for Initial OB Appointment.    Assessment & Plan     Pregnancy care:  - Healthy pregnancy at approximately 19 weeks gestation. No current nausea or vomiting. No history of preeclampsia or hypertension in previous pregnancy.  - Had labs already:  results reviewed and were normal.  - Has fetal anatomy ultrasound survey scheduled in 10 days.  - Will schedule all future appointments in advance due to scheduling constraints. The next appointment is to be scheduled approximately 6 weeks from 2025. Glucose test to be conducted without need for fasting.   - Patient should monitor for any need for work restrictions due to job demands (prolonged standing)  - Patient was educated regarding the importance of regular prenatal care, monitoring for abnormal symptoms such as abnormal discharge, and the option for non-invasive prenatal testing. Patient was also informed about the process for scheduling future appointments and the timing of glucose testing.  - Evaluation for Preeclampsia risk factors (per ACOG Dec 2021 Guidelines):  High risk factors (1+): none  Moderate risk factors (2+): none  Based on her risk factors, Pa is NOT at high risk of preeclampsia. Low-dose aspirin prophylaxis is NOT recommended for prevention of preeclampsia.     Cervical cancer screening:  - Due for a pap smear. Previous pap smears have been normal.  - Pap smear performed during this visit. If results are normal, next screening in three years.     Consent was obtained from the patient to use an AI documentation tool in the creation of this note.      Discussed orientation, general information, lifestyle, nutrition, exercise,warning signs, resources, lab testing, risk screening.  Questions answered.    Return to clinic in about 1 month for next OB visit.    27 min   Time spent by me today  doing chart review, history and exam, documentation and further activities per the note     Subjective:    Alvino Perkins is a 29 year old  here today for her First Obstetrical Exam.     History of Present Illness    Alvino Perkins, 29 years old, presents for pregnancy care.    Was seen at [facility] for previous pregnancy, but her doctor (Dr Welch) is leaving the clinic, so she decided to come here instead.    Her last menstrual period definitely began on 2025 and her menstrual cycles were regular prior to this. She reports feeling well and has started to perceive fetal movement.  She notes a small amount of vaginal discharge, which is not malodorous or pruritic.     Her weight prior to pregnancy was approximately 130 lbs, and she lost weight to 120 lbs due to nausea at the onset of pregnancy. She no longer experiences nausea or vomiting.    She experienced a miscarriage late last year, which resolved naturally without the need for surgical or medical intervention.     Her medical history includes kidney stones, scoliosis, and acne. She is currently taking only prenatal vitamins. She has not been hospitalized for any reason other than childbirth and has not undergone any surgeries.    She does not consume alcohol, smoke, or use illicit drugs. 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       There is no family history of preeclampsia, and she did not have hypertension during her previous pregnancy.             OB History    Para Term  AB Living   3 1 1 0 1 1   SAB IAB Ectopic Multiple Live Births   1 0 0 0 1      # Outcome Date GA Lbr Da/2nd Weight Sex Type Anes PTL Lv   3 Current            2 SAB 2024           1 Term 23 40w3d 02:59 / 00:33 2.85 kg (6 lb 4.5 oz) F Vag-Spont EPI N ELDON      Birth Comments: Pitocin augmentation.      Name: Miguel A Contreras      Apgar1: 8  Apgar5: 9       Past  "Medical History:   Diagnosis Date    Gonorrhea     Urinary tract infection      History reviewed. No pertinent surgical history.  Social History     Tobacco Use    Smoking status: Never     Passive exposure: Never    Smokeless tobacco: Never    Tobacco comments:     No exposure to second hand smoking.   Vaping Use    Vaping status: Never Used   Substance Use Topics    Alcohol use: No    Drug use: No     Current Outpatient Medications   Medication Sig Dispense Refill    Prenatal MV-Min-Fe Fum-FA-DHA (PRENATAL MULTIVITAMIN PLUS DHA) 27-0.8-250 MG CAPS Take 1 tablet by mouth daily. OK to substitute formulary equivalent 100 capsule 3     No Known Allergies    Family History   Problem Relation Age of Onset    No Known Problems Mother     No Known Problems Father     No Known Problems Maternal Grandmother     No Known Problems Maternal Grandfather     No Known Problems Brother     No Known Problems Sister        Objective:   Objective    Vitals:    07/15/25 1640   BP: 98/62   Pulse: 84   Resp: 20   Temp: 98.2  F (36.8  C)   TempSrc: Oral   SpO2: 98%   Weight: 57 kg (125 lb 12 oz)   Height: 1.499 m (4' 11\")     Physical Exam:  General Appearance: Alert, cooperative, no distress, appears stated age  Heart: Regular rate and rhythm, S1 and S2 normal, no murmur, rub, or gallop.  Lungs: Clear to auscultation bilaterally, respirations unlabored  Breasts:  not examined.   Abdomen: Soft, non-tender, bowel sounds active all four quadrants,  no masses, no organomegaly  Pelvic: External genitalia and vagina normal. Speculum exam reveals moderate discharge without odor. Pap Smear obtained.  Extremities: Extremities normal, atraumatic, no cyanosis or edema  Skin: Skin color, texture, turgor normal, no rashes or lesions  Neurologic: Normal          "

## 2025-07-25 ENCOUNTER — HOSPITAL ENCOUNTER (OUTPATIENT)
Dept: ULTRASOUND IMAGING | Facility: HOSPITAL | Age: 30
Discharge: HOME | End: 2025-07-25
Attending: FAMILY MEDICINE | Admitting: FAMILY MEDICINE
Payer: COMMERCIAL

## 2025-07-25 DIAGNOSIS — Z34.80 PRENATAL CARE, SUBSEQUENT PREGNANCY, UNSPECIFIED TRIMESTER: ICD-10-CM

## 2025-07-25 PROCEDURE — 76805 OB US >/= 14 WKS SNGL FETUS: CPT

## 2025-08-05 ENCOUNTER — TELEPHONE (OUTPATIENT)
Dept: FAMILY MEDICINE | Facility: CLINIC | Age: 30
End: 2025-08-05
Payer: COMMERCIAL

## 2025-08-26 ENCOUNTER — PRENATAL OFFICE VISIT (OUTPATIENT)
Dept: FAMILY MEDICINE | Facility: CLINIC | Age: 30
End: 2025-08-26
Payer: COMMERCIAL

## 2025-08-26 ENCOUNTER — MEDICAL CORRESPONDENCE (OUTPATIENT)
Dept: HEALTH INFORMATION MANAGEMENT | Facility: CLINIC | Age: 30
End: 2025-08-26
Payer: COMMERCIAL

## 2025-08-26 VITALS
RESPIRATION RATE: 16 BRPM | SYSTOLIC BLOOD PRESSURE: 108 MMHG | DIASTOLIC BLOOD PRESSURE: 69 MMHG | HEART RATE: 77 BPM | HEIGHT: 59 IN | TEMPERATURE: 97.6 F | OXYGEN SATURATION: 99 % | WEIGHT: 128.75 LBS | BODY MASS INDEX: 25.96 KG/M2

## 2025-08-26 DIAGNOSIS — Z34.82 ENCOUNTER FOR SUPERVISION OF OTHER NORMAL PREGNANCY IN SECOND TRIMESTER: Primary | ICD-10-CM

## 2025-08-26 PROCEDURE — 99207 PR PRENATAL VISIT: CPT | Performed by: FAMILY MEDICINE

## 2025-08-26 PROCEDURE — 3078F DIAST BP <80 MM HG: CPT | Performed by: FAMILY MEDICINE

## 2025-08-26 PROCEDURE — 3074F SYST BP LT 130 MM HG: CPT | Performed by: FAMILY MEDICINE

## 2025-08-26 PROCEDURE — 0502F SUBSEQUENT PRENATAL CARE: CPT | Performed by: FAMILY MEDICINE

## 2025-09-01 LAB
ABO + RH BLD: NORMAL
BLD GP AB SCN SERPL QL: NEGATIVE
SPECIMEN EXP DATE BLD: NORMAL

## 2025-09-02 ENCOUNTER — PRENATAL OFFICE VISIT (OUTPATIENT)
Dept: FAMILY MEDICINE | Facility: CLINIC | Age: 30
End: 2025-09-02
Payer: COMMERCIAL

## 2025-09-02 VITALS
SYSTOLIC BLOOD PRESSURE: 106 MMHG | TEMPERATURE: 98.1 F | HEART RATE: 85 BPM | WEIGHT: 126 LBS | DIASTOLIC BLOOD PRESSURE: 66 MMHG | RESPIRATION RATE: 18 BRPM | OXYGEN SATURATION: 99 % | BODY MASS INDEX: 25.4 KG/M2 | HEIGHT: 59 IN

## 2025-09-02 DIAGNOSIS — Z34.90 SUPERVISION OF NORMAL PREGNANCY: ICD-10-CM

## 2025-09-02 DIAGNOSIS — Z34.80 PRENATAL CARE, SUBSEQUENT PREGNANCY, UNSPECIFIED TRIMESTER: ICD-10-CM

## 2025-09-02 DIAGNOSIS — N92.6 MISSED MENSES: Primary | ICD-10-CM

## 2025-09-02 LAB
ALBUMIN UR-MCNC: NEGATIVE MG/DL
ERYTHROCYTE [DISTWIDTH] IN BLOOD BY AUTOMATED COUNT: 12.6 % (ref 10–15)
EST. AVERAGE GLUCOSE BLD GHB EST-MCNC: 103 MG/DL
GLUCOSE UR STRIP-MCNC: NEGATIVE MG/DL
HBA1C MFR BLD: 5.2 % (ref 0–5.6)
HCG UR QL: POSITIVE
HCT VFR BLD AUTO: 36.9 % (ref 35–47)
HGB BLD-MCNC: 12.6 G/DL (ref 11.7–15.7)
KETONES UR STRIP-MCNC: NEGATIVE MG/DL
MCH RBC QN AUTO: 31.7 PG (ref 26.5–33)
MCHC RBC AUTO-ENTMCNC: 34.1 G/DL (ref 31.5–36.5)
MCV RBC AUTO: 92.7 FL (ref 78–100)
PLATELET # BLD AUTO: 299 10E3/UL (ref 150–450)
RBC # BLD AUTO: 3.98 10E6/UL (ref 3.8–5.2)
RUBV IGG SERPL QL IA: 1.4 INDEX
RUBV IGG SERPL QL IA: POSITIVE
T PALLIDUM AB SER QL: NONREACTIVE
VZV IGG SER QL IA: 0.73 S/CO
VZV IGG SER QL IA: NEGATIVE
WBC # BLD AUTO: 7.95 10E3/UL (ref 4–11)

## 2025-09-02 PROCEDURE — 83655 ASSAY OF LEAD: CPT | Mod: 90

## 2025-09-02 PROCEDURE — 86803 HEPATITIS C AB TEST: CPT

## 2025-09-02 PROCEDURE — 87491 CHLMYD TRACH DNA AMP PROBE: CPT

## 2025-09-02 PROCEDURE — 86780 TREPONEMA PALLIDUM: CPT

## 2025-09-02 PROCEDURE — 86762 RUBELLA ANTIBODY: CPT

## 2025-09-02 PROCEDURE — 87591 N.GONORRHOEAE DNA AMP PROB: CPT

## 2025-09-02 PROCEDURE — 36415 COLL VENOUS BLD VENIPUNCTURE: CPT

## 2025-09-02 PROCEDURE — 86850 RBC ANTIBODY SCREEN: CPT

## 2025-09-02 PROCEDURE — 99207 PR NO CHARGE NURSE ONLY: CPT

## 2025-09-02 PROCEDURE — 87389 HIV-1 AG W/HIV-1&-2 AB AG IA: CPT

## 2025-09-02 PROCEDURE — 87086 URINE CULTURE/COLONY COUNT: CPT

## 2025-09-02 PROCEDURE — 86900 BLOOD TYPING SEROLOGIC ABO: CPT

## 2025-09-02 PROCEDURE — 81025 URINE PREGNANCY TEST: CPT

## 2025-09-02 PROCEDURE — 86901 BLOOD TYPING SEROLOGIC RH(D): CPT

## 2025-09-02 PROCEDURE — 81003 URINALYSIS AUTO W/O SCOPE: CPT

## 2025-09-02 PROCEDURE — 83036 HEMOGLOBIN GLYCOSYLATED A1C: CPT

## 2025-09-02 PROCEDURE — 85027 COMPLETE CBC AUTOMATED: CPT

## 2025-09-02 PROCEDURE — 87340 HEPATITIS B SURFACE AG IA: CPT

## 2025-09-02 PROCEDURE — 86787 VARICELLA-ZOSTER ANTIBODY: CPT

## 2025-09-02 PROCEDURE — 99000 SPECIMEN HANDLING OFFICE-LAB: CPT

## 2025-09-02 RX ORDER — PRENATAL VIT/IRON FUM/FOLIC AC 27MG-0.8MG
1 TABLET ORAL DAILY
Qty: 90 TABLET | Refills: 3 | Status: SHIPPED | OUTPATIENT
Start: 2025-09-02

## 2025-09-02 RX ORDER — PRENATAL VIT/IRON FUM/FOLIC AC 27MG-0.8MG
1 TABLET ORAL DAILY
Qty: 90 TABLET | Refills: 3 | Status: SHIPPED | OUTPATIENT
Start: 2025-09-02 | End: 2025-09-02

## 2025-09-02 ASSESSMENT — PAIN SCALES - GENERAL: PAINLEVEL_OUTOF10: NO PAIN (0)

## 2025-09-03 LAB
C TRACH DNA SPEC QL PROBE+SIG AMP: NEGATIVE
HBV SURFACE AG SERPL QL IA: NONREACTIVE
HCV AB SERPL QL IA: NONREACTIVE
HIV 1+2 AB+HIV1 P24 AG SERPL QL IA: NONREACTIVE
LEAD BLDV-MCNC: <2 UG/DL
N GONORRHOEA DNA SPEC QL NAA+PROBE: NEGATIVE
SPECIMEN TYPE: NORMAL

## 2025-09-04 LAB — BACTERIA UR CULT: NORMAL
